# Patient Record
Sex: MALE | Race: WHITE | NOT HISPANIC OR LATINO | Employment: FULL TIME | URBAN - METROPOLITAN AREA
[De-identification: names, ages, dates, MRNs, and addresses within clinical notes are randomized per-mention and may not be internally consistent; named-entity substitution may affect disease eponyms.]

---

## 2018-09-05 ENCOUNTER — OFFICE VISIT (OUTPATIENT)
Dept: URGENT CARE | Facility: CLINIC | Age: 28
End: 2018-09-05
Payer: COMMERCIAL

## 2018-09-05 VITALS
TEMPERATURE: 102 F | OXYGEN SATURATION: 99 % | WEIGHT: 190 LBS | DIASTOLIC BLOOD PRESSURE: 78 MMHG | HEART RATE: 97 BPM | HEIGHT: 70 IN | BODY MASS INDEX: 27.2 KG/M2 | RESPIRATION RATE: 18 BRPM | SYSTOLIC BLOOD PRESSURE: 126 MMHG

## 2018-09-05 DIAGNOSIS — J01.90 ACUTE BACTERIAL RHINOSINUSITIS: Primary | ICD-10-CM

## 2018-09-05 DIAGNOSIS — B96.89 ACUTE BACTERIAL RHINOSINUSITIS: Primary | ICD-10-CM

## 2018-09-05 PROCEDURE — 99203 OFFICE O/P NEW LOW 30 MIN: CPT | Mod: S$GLB,,, | Performed by: NURSE PRACTITIONER

## 2018-09-05 RX ORDER — AMOXICILLIN AND CLAVULANATE POTASSIUM 875; 125 MG/1; MG/1
1 TABLET, FILM COATED ORAL 2 TIMES DAILY
Qty: 20 TABLET | Refills: 0 | Status: SHIPPED | OUTPATIENT
Start: 2018-09-05 | End: 2018-09-15

## 2018-09-05 RX ORDER — FLUTICASONE PROPIONATE 50 MCG
1 SPRAY, SUSPENSION (ML) NASAL DAILY
Qty: 1 BOTTLE | Refills: 0 | Status: SHIPPED | OUTPATIENT
Start: 2018-09-05 | End: 2022-06-25

## 2018-09-05 RX ORDER — PREDNISONE 20 MG/1
20 TABLET ORAL DAILY
Qty: 5 TABLET | Refills: 0 | Status: SHIPPED | OUTPATIENT
Start: 2018-09-05 | End: 2018-09-10

## 2018-09-05 RX ORDER — FLUOXETINE HYDROCHLORIDE 40 MG/1
CAPSULE ORAL
Refills: 0 | COMMUNITY
Start: 2018-08-11 | End: 2020-11-02

## 2018-09-05 RX ORDER — MONTELUKAST SODIUM 10 MG/1
10 TABLET ORAL NIGHTLY
COMMUNITY
End: 2024-03-05

## 2018-09-05 RX ORDER — FINASTERIDE 5 MG/1
TABLET, FILM COATED ORAL
Refills: 12 | COMMUNITY
Start: 2018-08-23

## 2018-09-05 NOTE — PATIENT INSTRUCTIONS
Sinusitis (Antibiotic Treatment)    The sinuses are air-filled spaces within the bones of the face. They connect to the inside of the nose. Sinusitis is an inflammation of the tissue lining the sinus cavity. Sinus inflammation can occur during a cold. It can also be due to allergies to pollens and other particles in the air. Sinusitis can cause symptoms of sinus congestion and fullness. A sinus infection causes fever, headache and facial pain. There is often green or yellow drainage from the nose or into the back of the throat (post-nasal drip). You have been given antibiotics to treat this condition.  Home care:  · Take the full course of antibiotics as instructed. Do not stop taking them, even if you feel better.  · Drink plenty of water, hot tea, and other liquids. This may help thin mucus. It also may promote sinus drainage.  · Heat may help soothe painful areas of the face. Use a towel soaked in hot water. Or,  the shower and direct the hot spray onto your face. Using a vaporizer along with a menthol rub at night may also help.   · An expectorant containing guaifenesin may help thin the mucus and promote drainage from the sinuses.  · Over-the-counter decongestants may be used unless a similar medicine was prescribed. Nasal sprays work the fastest. Use one that contains phenylephrine or oxymetazoline. First blow the nose gently. Then use the spray. Do not use these medicines more often than directed on the label or symptoms may get worse. You may also use tablets containing pseudoephedrine. Avoid products that combine ingredients, because side effects may be increased. Read labels. You can also ask the pharmacist for help. (NOTE: Persons with high blood pressure should not use decongestants. They can raise blood pressure.)  · Over-the-counter antihistamines may help if allergies contributed to your sinusitis.    · Do not use nasal rinses or irrigation during an acute sinus infection, unless told to by  your health care provider. Rinsing may spread the infection to other sinuses.  · Use acetaminophen or ibuprofen to control pain, unless another pain medicine was prescribed. (If you have chronic liver or kidney disease or ever had a stomach ulcer, talk with your doctor before using these medicines. Aspirin should never be used in anyone under 18 years of age who is ill with a fever. It may cause severe liver damage.)  · Don't smoke. This can worsen symptoms.  Follow-up care  Follow up with your healthcare provider or our staff if you are not improving within the next week.  When to seek medical advice  Call your healthcare provider if any of these occur:  · Facial pain or headache becoming more severe  · Stiff neck  · Unusual drowsiness or confusion  · Swelling of the forehead or eyelids  · Vision problems, including blurred or double vision  · Fever of 100.4ºF (38ºC) or higher, or as directed by your healthcare provider  · Seizure  · Breathing problems  · Symptoms not resolving within 10 days  Date Last Reviewed: 4/13/2015  © 3303-1473 [employer]. [address] [place] [address]. All rights reserved. This information is not intended as a substitute for professional medical care. Always follow your healthcare professional's instructions.                                                                    Sinusitis   If your condition worsens or fails to improve we recommend that you receive another evaluation at the ER immediately or contact your PCP to discuss your concerns or return here. You must understand that you've received an urgent care treatment only and that you may be released before all your medical problems are known or treated. You the patient will arrange for followup care as instructed.   If we discussed that I think your illness is viral it will not respond to antibiotics and it will last 10-14 days. However, if over the next few days the symptoms worsen start the  "antibiotics I have given you.   If we discussed that you require antibiotics start them now and take them to completion.   If you are female and on BCP and do take the antibiotics, use additional methods to prevent pregnancy while on the antibiotics and for one cycle after.   Flonase (fluticasone) is a nasal spray which is available over the counter and may help with your symptoms   Zyrtec D, Claritin D or allegra D can also help with symptoms of congestion and drainage.   If you have hypertension avoid using the "D" which is the decongestant   If you just have drainage you can take plain zyrtec, claritin or allegra   If you just have a congested feeling you can take pseudoephedrine (unless you have high blood pressure) which you have to sign for behind the counter. Do not buy the phenylephrine which is on the shelf as it is not effective   Rest and fluids are also important.   Tylenol or ibuprofen can also be used as directed for pain unless you have an allergy to them or medical condition such as stomach ulcers, kidney or liver disease or blood thinners etc for which you should not be taking these type of medications.   If you are flying in the next few days Afrin nose drops for the airplane flight upon take off and landing may help. Other than at those times refrain from using afrin.   If you were prescribed a narcotic do not drive or operate heavy machinery while taking these medications.     -Please take antibiotic to completion.  -Below are suggestions for symptomatic relief:              -Tylenol every 4 hours OR ibuprofen every 6 hours as needed for pain/fever.              -Salt water gargles to soothe throat pain.              -Chloroseptic spray also helps to numb throat pain.              -Nasal saline spray reduces inflammation and dryness.              -Warm face compresses to help with facial sinus pain/pressure.              -Vicks vapor rub at night.              -Flonase OTC or Nasacort OTC for " nasal congestion.              -Simple foods like chicken noodle soup.              -Delsym helps with coughing at night              -Zyrtec/Claritin during the day & Benadryl at night may help with allergies.    -10 days of antibiotics.  -5 days of steroids.  -Flonase twice daily.  -Afrin as needed on the plane.  -Claritin-D or Zyrtec-D.  -Be sure to drink plenty of fluids.    Please follow up with your Primary care provider within 2-5 days if your signs and symptoms have not resolved or worsen.     If your condition worsens or fails to improve we recommend that you receive another evaluation at the emergency room immediately or contact your primary medical clinic to discuss your concerns.   You must understand that you have received an Urgent Care treatment only and that you may be released before all of your medical problems are known or treated. You, the patient, will arrange for follow up care as instructed.

## 2018-09-05 NOTE — PROGRESS NOTES
"Subjective:       Patient ID: Larry Madden is a 28 y.o. male.    Vitals:    09/05/18 1710   BP: 126/78   Pulse: 97   Resp: 18   Temp: (!) 101.8 °F (38.8 °C)   SpO2: 99%   Weight: 86.2 kg (190 lb)   Height: 5' 10" (1.778 m)       Chief Complaint: Fever and Sore Throat    Pt came in today with complaint of a fever. Pt been having symptoms for about week . Pt stated that he believe caught a virus from a friend. Pt went Ent  doctor was prescribed   b12 and steroid injection about a week ago . Pt also in complaining of sinus congestion and headaches and feeling fatigue .  Pt been taking advil cold and sinus with some relief.   His symptoms initially improved but have drastically worsened over the last 2 days.  Fever returned today.  He leaves for a flight tomorrow to [place] does not want to be stuck without any antibiotics.  He is complaining of sinus pressure and headaches.      Fever    The current episode started 1 to 4 weeks ago. The problem occurs constantly. The maximum temperature noted was 100 to 100.9 F. Associated symptoms include congestion, headaches and a sore throat. Pertinent negatives include no abdominal pain, chest pain, diarrhea, nausea, rash or vomiting. He has tried nothing for the symptoms.   Sore Throat    The current episode started 1 to 4 weeks ago. The problem has been gradually improving. Sore throat worse side: both. The maximum temperature recorded prior to his arrival was 101 - 101.9 F. The pain is at a severity of 5/10. Associated symptoms include congestion and headaches. Pertinent negatives include no abdominal pain, diarrhea, shortness of breath or vomiting. He has tried acetaminophen for the symptoms. The treatment provided mild relief.     Review of Systems   Constitution: Positive for fever. Negative for chills.   HENT: Positive for congestion and sore throat.    Eyes: Negative for blurred vision.   Cardiovascular: Negative for chest pain.   Respiratory: Negative for " shortness of breath.    Skin: Negative for rash.   Musculoskeletal: Negative for back pain and joint pain.   Gastrointestinal: Negative for abdominal pain, diarrhea, nausea and vomiting.   Neurological: Positive for headaches.   Psychiatric/Behavioral: The patient is not nervous/anxious.    All other systems reviewed and are negative.      Objective:      Physical Exam   Constitutional: He is oriented to person, place, and time. He appears well-developed and well-nourished. He is cooperative.  Non-toxic appearance. He does not appear ill. No distress.   HENT:   Head: Normocephalic and atraumatic.   Right Ear: Hearing, tympanic membrane, external ear and ear canal normal.   Left Ear: Hearing, tympanic membrane, external ear and ear canal normal.   Nose: Mucosal edema and rhinorrhea present. No nasal deformity. No epistaxis. Right sinus exhibits maxillary sinus tenderness. Right sinus exhibits no frontal sinus tenderness. Left sinus exhibits maxillary sinus tenderness. Left sinus exhibits no frontal sinus tenderness.   Mouth/Throat: Uvula is midline and mucous membranes are normal. No trismus in the jaw. Normal dentition. No uvula swelling. Posterior oropharyngeal erythema (cobblestone apperance ) present. Tonsils are 1+ on the right. Tonsils are 1+ on the left. No tonsillar exudate.   Eyes: Conjunctivae and lids are normal. No scleral icterus.   Sclera clear bilat   Neck: Trachea normal, full passive range of motion without pain and phonation normal. Neck supple.   Cardiovascular: Normal rate, regular rhythm, normal heart sounds, intact distal pulses and normal pulses.   Pulmonary/Chest: Effort normal and breath sounds normal. No respiratory distress.   Abdominal: Soft. Normal appearance and bowel sounds are normal. He exhibits no distension. There is no tenderness.   Musculoskeletal: Normal range of motion. He exhibits no edema or deformity.   Neurological: He is alert and oriented to person, place, and time. He  exhibits normal muscle tone. Coordination normal.   Skin: Skin is warm, dry and intact. He is not diaphoretic. No pallor.   Psychiatric: He has a normal mood and affect. His speech is normal and behavior is normal. Judgment and thought content normal. Cognition and memory are normal.   Nursing note and vitals reviewed.      Assessment:       1. Acute bacterial rhinosinusitis        Plan:       Larry was seen today for fever and sore throat.    Diagnoses and all orders for this visit:    Acute bacterial rhinosinusitis  -     fluticasone (FLONASE) 50 mcg/actuation nasal spray; 1 spray (50 mcg total) by Each Nare route once daily.  -     amoxicillin-clavulanate 875-125mg (AUGMENTIN) 875-125 mg per tablet; Take 1 tablet by mouth 2 (two) times daily. for 10 days  -     predniSONE (DELTASONE) 20 MG tablet; Take 1 tablet (20 mg total) by mouth once daily. for 5 days      Patient Instructions   Sinusitis (Antibiotic Treatment)    The sinuses are air-filled spaces within the bones of the face. They connect to the inside of the nose. Sinusitis is an inflammation of the tissue lining the sinus cavity. Sinus inflammation can occur during a cold. It can also be due to allergies to pollens and other particles in the air. Sinusitis can cause symptoms of sinus congestion and fullness. A sinus infection causes fever, headache and facial pain. There is often green or yellow drainage from the nose or into the back of the throat (post-nasal drip). You have been given antibiotics to treat this condition.  Home care:  · Take the full course of antibiotics as instructed. Do not stop taking them, even if you feel better.  · Drink plenty of water, hot tea, and other liquids. This may help thin mucus. It also may promote sinus drainage.  · Heat may help soothe painful areas of the face. Use a towel soaked in hot water. Or,  the shower and direct the hot spray onto your face. Using a vaporizer along with a menthol rub at night may  also help.   · An expectorant containing guaifenesin may help thin the mucus and promote drainage from the sinuses.  · Over-the-counter decongestants may be used unless a similar medicine was prescribed. Nasal sprays work the fastest. Use one that contains phenylephrine or oxymetazoline. First blow the nose gently. Then use the spray. Do not use these medicines more often than directed on the label or symptoms may get worse. You may also use tablets containing pseudoephedrine. Avoid products that combine ingredients, because side effects may be increased. Read labels. You can also ask the pharmacist for help. (NOTE: Persons with high blood pressure should not use decongestants. They can raise blood pressure.)  · Over-the-counter antihistamines may help if allergies contributed to your sinusitis.    · Do not use nasal rinses or irrigation during an acute sinus infection, unless told to by your health care provider. Rinsing may spread the infection to other sinuses.  · Use acetaminophen or ibuprofen to control pain, unless another pain medicine was prescribed. (If you have chronic liver or kidney disease or ever had a stomach ulcer, talk with your doctor before using these medicines. Aspirin should never be used in anyone under 18 years of age who is ill with a fever. It may cause severe liver damage.)  · Don't smoke. This can worsen symptoms.  Follow-up care  Follow up with your healthcare provider or our staff if you are not improving within the next week.  When to seek medical advice  Call your healthcare provider if any of these occur:  · Facial pain or headache becoming more severe  · Stiff neck  · Unusual drowsiness or confusion  · Swelling of the forehead or eyelids  · Vision problems, including blurred or double vision  · Fever of 100.4ºF (38ºC) or higher, or as directed by your healthcare provider  · Seizure  · Breathing problems  · Symptoms not resolving within 10 days  Date Last Reviewed: 4/13/2015  ©  "4416-9773 [employer]. [address] [place] [address], [place] [address]. All rights reserved. This information is not intended as a substitute for professional medical care. Always follow your healthcare professional's instructions.                                                                    Sinusitis   If your condition worsens or fails to improve we recommend that you receive another evaluation at the ER immediately or contact your PCP to discuss your concerns or return here. You must understand that you've received an urgent care treatment only and that you may be released before all your medical problems are known or treated. You the patient will arrange for followup care as instructed.   If we discussed that I think your illness is viral it will not respond to antibiotics and it will last 10-14 days. However, if over the next few days the symptoms worsen start the antibiotics I have given you.   If we discussed that you require antibiotics start them now and take them to completion.   If you are female and on BCP and do take the antibiotics, use additional methods to prevent pregnancy while on the antibiotics and for one cycle after.   Flonase (fluticasone) is a nasal spray which is available over the counter and may help with your symptoms   Zyrtec D, Claritin D or allegra D can also help with symptoms of congestion and drainage.   If you have hypertension avoid using the "D" which is the decongestant   If you just have drainage you can take plain zyrtec, claritin or allegra   If you just have a congested feeling you can take pseudoephedrine (unless you have high blood pressure) which you have to sign for behind the counter. Do not buy the phenylephrine which is on the shelf as it is not effective   Rest and fluids are also important.   Tylenol or ibuprofen can also be used as directed for pain unless you have an allergy to them or medical condition such as stomach ulcers, kidney or liver disease " or blood thinners etc for which you should not be taking these type of medications.   If you are flying in the next few days Afrin nose drops for the airplane flight upon take off and landing may help. Other than at those times refrain from using afrin.   If you were prescribed a narcotic do not drive or operate heavy machinery while taking these medications.     -Please take antibiotic to completion.  -Below are suggestions for symptomatic relief:              -Tylenol every 4 hours OR ibuprofen every 6 hours as needed for pain/fever.              -Salt water gargles to soothe throat pain.              -Chloroseptic spray also helps to numb throat pain.              -Nasal saline spray reduces inflammation and dryness.              -Warm face compresses to help with facial sinus pain/pressure.              -Vicks vapor rub at night.              -Flonase OTC or Nasacort OTC for nasal congestion.              -Simple foods like chicken noodle soup.              -Delsym helps with coughing at night              -Zyrtec/Claritin during the day & Benadryl at night may help with allergies.    -10 days of antibiotics.  -5 days of steroids.  -Flonase twice daily.  -Afrin as needed on the plane.  -Claritin-D or Zyrtec-D.  -Be sure to drink plenty of fluids.    Please follow up with your Primary care provider within 2-5 days if your signs and symptoms have not resolved or worsen.     If your condition worsens or fails to improve we recommend that you receive another evaluation at the emergency room immediately or contact your primary medical clinic to discuss your concerns.   You must understand that you have received an Urgent Care treatment only and that you may be released before all of your medical problems are known or treated. You, the patient, will arrange for follow up care as instructed.

## 2018-09-09 ENCOUNTER — TELEPHONE (OUTPATIENT)
Dept: URGENT CARE | Facility: CLINIC | Age: 28
End: 2018-09-09

## 2019-06-06 ENCOUNTER — TELEPHONE (OUTPATIENT)
Dept: SLEEP MEDICINE | Facility: CLINIC | Age: 29
End: 2019-06-06

## 2019-06-06 NOTE — TELEPHONE ENCOUNTER
----- Message from Katya Copeland sent at 6/5/2019  4:34 PM CDT -----  Contact: pt   Name of Who is Calling: SIDNEY SORENSEN [[id number]]       What is the request in detail: Patient is requesting a call in regards to getting a sleep study...... Please contact to further discuss and advise.     Can the clinic reply by [facility]: yes     What Number to Call Back if not in [facility]: [phone]

## 2019-07-10 ENCOUNTER — TELEPHONE (OUTPATIENT)
Dept: SLEEP MEDICINE | Facility: CLINIC | Age: 29
End: 2019-07-10

## 2019-07-10 NOTE — TELEPHONE ENCOUNTER
----- Message from Ara Lin sent at 7/9/2019  4:59 PM CDT -----  Contact: pt can no each [phone]  Pt called to reschedule his appt . Please contact pt      Thank you!

## 2019-07-11 ENCOUNTER — OFFICE VISIT (OUTPATIENT)
Dept: SLEEP MEDICINE | Facility: CLINIC | Age: 29
End: 2019-07-11
Payer: COMMERCIAL

## 2019-07-11 VITALS
HEIGHT: 70 IN | BODY MASS INDEX: 29.08 KG/M2 | SYSTOLIC BLOOD PRESSURE: 122 MMHG | WEIGHT: 203.13 LBS | HEART RATE: 79 BPM | DIASTOLIC BLOOD PRESSURE: 81 MMHG

## 2019-07-11 DIAGNOSIS — G47.30 SLEEP APNEA, UNSPECIFIED TYPE: Primary | ICD-10-CM

## 2019-07-11 PROCEDURE — 99204 OFFICE O/P NEW MOD 45 MIN: CPT | Mod: S$GLB,,, | Performed by: PSYCHIATRY & NEUROLOGY

## 2019-07-11 PROCEDURE — 99999 PR PBB SHADOW E&M-EST. PATIENT-LVL III: ICD-10-PCS | Mod: PBBFAC,,, | Performed by: PSYCHIATRY & NEUROLOGY

## 2019-07-11 PROCEDURE — 99999 PR PBB SHADOW E&M-EST. PATIENT-LVL III: CPT | Mod: PBBFAC,,, | Performed by: PSYCHIATRY & NEUROLOGY

## 2019-07-11 PROCEDURE — 99204 PR OFFICE/OUTPT VISIT, NEW, LEVL IV, 45-59 MIN: ICD-10-PCS | Mod: S$GLB,,, | Performed by: PSYCHIATRY & NEUROLOGY

## 2019-07-11 NOTE — PATIENT INSTRUCTIONS
SLEEP LAB ([facility] or [facility]) will contact you to schedulethe sleep study. Their number is [phone] (ext [phone]). Please call them if you do not hear from them in 2 weeks from now.  The [facility] is located on 7th floor of the [facility]; [facility] is located in [facility].    SLEEP CLINIC (my assistant) will call you when the sleep study results are ready - if you have not heard from us by 2 weeks from the date of the study, please call [phone] (ext 1) or you can [facility] to contact me.    You are advised to abstain from driving should you feel sleepy or drowsy.

## 2019-07-11 NOTE — PROGRESS NOTES
Larry Madden  was seen at the request of  Self, A[facility] for sleep evaluation.    07/11/2019 INITIAL HISTORY OF PRESENT ILLNESS:  Larry Madden is a 29 y.o. male is here to be evaluated for a sleep disorder.       CHIEF COMPLAINT:      The patient's complaints include excessive daytime sleepiness, excessive daytime fatigue, snoring,  witnessed breathing pauses,  gasping for air in sleep and interrupted sleep since  Several years ago.    Worse over the last year - since he gained 30 lbs.    Prior to that - septoplasty - initial relief; but nasal patency     + asthma.    Taking Flonase and Singulair.      EPWORTH SLEEPINESS SCALE 7/11/2019   Sitting and reading 3   Watching TV 2   Sitting, inactive in a public place (e.g. a theatre or a meeting) 1   As a passenger in a car for an hour without a break 1   Lying down to rest in the afternoon when circumstances permit 2   Sitting and talking to someone 1   Sitting quietly after a lunch without alcohol 1   In a car, while stopped for a few minutes in traffic 1   Total score 12       No flowsheet data found.  No flowsheet data found.      SLEEP ROUTINE AND LIFESTYLE 07/11/2019 :  Sleep Clinic New Patient 7/11/2019   What time do you go to bed on a week day? (Give a range) 10:30-11:30 pm   What time do you go to bed on a day off? (Give a range) Midnight   How long does it take you to fall asleep? (Give a range) 5 minutes or less   On average, how many times per night do you wake up? 0   How long does it take you to fall back into sleep? (Give a range) 0   What time do you wake up to start your day on a week day? (Give a range) 7 - 7:30 am   What time do you wake up to start your day on a day off? (Give a range) 7:30 - 9:00 am   What time do you get out of bed? (Give a range) 7 - 7:30 am   On average, how many hours do you sleep? 8-9   On average, how many naps do you take per day? usually one nap on one weekend day   Rate your sleep quality from 0 to 5  (0-poor, 5-great). 2   Have you experienced:  Weight gain?   How much weight have you lost or gained (in lbs.) in the last year? I have gained 20 lbs in the past year   On average, how many times per night do you go to the bathroom?  0   Have you ever had a sleep study/CPAP machine/surgery for sleep apnea? No   Have you ever had a CPAP machine for sleep apnea? No   Have you ever had surgery for sleep apnea? No       Sleep Clinic ROS  7/11/2019   Difficulty breathing through the nose?  Yes   Sore throat or dry mouth in the morning? Sometimes   Irregular or very fast heart beat?  Sometimes   Shortness of breath?  Yes   Acid reflux? No   Body aches and pains?  Yes   Morning headaches? Sometimes   Dizziness? No   Mood changes?  Yes   Do you exercise?  Sometimes   Do you feel like moving your legs a lot?  Sometimes       + sleep talking since childhood.         The patient had septoplasty in the past; no h/o T&A      Social History:      Occupation: daytime  Bed partner:   Exercise routine: a little  Caffeine:      PREVIOUS SLEEP STUDIES:     None      DME:       PAST MEDICAL HISTORY:    Active Ambulatory Problems     Diagnosis Date Noted    No Active Ambulatory Problems     Resolved Ambulatory Problems     Diagnosis Date Noted    No Resolved Ambulatory Problems     Past Medical History:   Diagnosis Date    Anxiety                 PAST SURGICAL HISTORY:    No past surgical history on file.      FAMILY HISTORY:                Family History   Problem Relation Age of Onset    Depression Mother     Arthritis Father        SOCIAL HISTORY:          Tobacco:   Social History     Tobacco Use   Smoking Status Never Smoker   Smokeless Tobacco Never Used       alcohol use:    Social History     Substance and Sexual Activity   Alcohol Use Yes                   ALLERGIES:  Review of patient's allergies indicates:  No Known Allergies    CURRENT MEDICATIONS:    Current Outpatient Medications   Medication Sig Dispense Refill     "finasteride (PROSCAR) 5 mg tablet TK 1/2 T PO EVERY OTHER DAY  12    FLUoxetine (PROZAC) 40 MG capsule TK 1 C PO D  0    fluticasone (FLONASE) 50 mcg/actuation nasal spray 1 spray (50 mcg total) by Each Nare route once daily. 1 Bottle 0    montelukast (SINGULAIR) 10 mg tablet Take 10 mg by mouth every evening.       No current facility-administered medications for this visit.                       REVIEW OF SYSTEMS:   Sleep related symptoms as per HPI    denies weight gain  Denies dyspnea  Denies palpitations  Denies acid reflux   Denies polyuria  Denies  mood diturbance  Denies  anemia  Denies  muscle pain  Denies  Gait imbalance    Otherwise, a balance of 10 systems reviewed is negative.    PHYSICAL EXAM:  /81 (BP Location: Left arm, Patient Position: Sitting, BP Method: Large (Automatic))   Pulse 79   Ht 5' 10" (1.778 m)   Wt 92.1 kg (203 lb 2.5 oz)   BMI 29.15 kg/m²   GENERAL: Normal development, well groomed.  HEENT:   HEENT:  Conjunctivae are non-erythematous; Pupils equal, round, and reactive to light; Nose is symmetrical; Nasal mucosa is pink and moist; Septum is midline; Inferior turbinates are normal; Nasal airflow is normal; Posterior pharynx is pink; Modified Mallampati:III-IV; Posterior palate is low; Tonsils not visualized; Uvula is wide and elongated;Tongue is enlarged; Dentition is fair; No TMJ tenderness; Jaw opening and protrusion without click and without discomfort.  NECK: Supple. Neck circumference is 18 inches. No thyromegaly. No palpable nodes.     SKIN: On face and neck: No abrasions, no rashes, no lesions.  No subcutaneous nodules are palpable.  RESPIRATORY: Chest is clear to auscultation.  Normal chest expansion and non-labored breathing at rest.  CARDIOVASCULAR: Normal S1, S2.  No murmurs, gallops or rubs. No carotid bruits bilaterally.  No edema. No clubbing. No cyanosis.    NEURO: Oriented to time, place and person. Normal attention span and concentration. Gait normal.  " "  PSYCH: Affect is full. Mood is normal  MUSCULOSKELETAL: Moves 4 extremities. Gait normal.         Using [facility]: yes      ASSESSMENT:    1. Sleep Apnea NEC. The patient symptomatically has  excessive daytime sleepiness, snoring,  witnessed breathing pauses, excessive daytime fatigue, gasping for air in sleep, interrupted sleep and nocturia  with exam findings of "a crowded oral airway and elevated body mass index. The patient has medical co-morbidities of  Anxiety and depression which can be worsened by GIOVANNA. This warrants further investigation for possible obstructive sleep apnea.          PLAN:    Diagnostic: HST The nature of this procedure and its indication was discussed with the patient. he would  like to come discuss PSG results.      More than 15 minutes of this 30 minutes visit was spent in counseling: during our discussion today, we talked about the etiology of  GIOVANNA as well as the potential ramifications of untreated sleep apnea, which could include daytime sleepiness, hypertension, heart disease and/or stroke.  We discussed potential treatment options, which could include weight loss, body positioning, continuous positive airway pressure (CPAP), or referral for surgical consideration. Meanwhile, he  is urged to avoid supine sleep, weight gain and alcoholic beverages since all of these can worsen GIOVANNA.     Precautions: The patient was advised to abstain from driving should he feel sleepy or drowsy.    Follow up: MD/NP  after the sleep study has been completed.     Thank you for allowing me the opportunity to participate in the care of your patient.    This visit summary will be sent to referring provider via inbasket      "

## 2019-07-19 ENCOUNTER — TELEPHONE (OUTPATIENT)
Dept: SLEEP MEDICINE | Facility: OTHER | Age: 29
End: 2019-07-19

## 2019-08-23 ENCOUNTER — HOSPITAL ENCOUNTER (OUTPATIENT)
Dept: SLEEP MEDICINE | Facility: OTHER | Age: 29
Discharge: HOME | End: 2019-08-23
Attending: PSYCHIATRY & NEUROLOGY
Payer: COMMERCIAL

## 2019-08-23 DIAGNOSIS — G47.30 SLEEP APNEA, UNSPECIFIED TYPE: ICD-10-CM

## 2019-08-23 PROCEDURE — 95800 SLP STDY UNATTENDED: CPT

## 2019-09-19 ENCOUNTER — PATIENT MESSAGE (OUTPATIENT)
Dept: SLEEP MEDICINE | Facility: CLINIC | Age: 29
End: 2019-09-19

## 2020-01-17 ENCOUNTER — OFFICE VISIT (OUTPATIENT)
Dept: SLEEP MEDICINE | Facility: CLINIC | Age: 30
End: 2020-01-17
Payer: COMMERCIAL

## 2020-01-17 VITALS
BODY MASS INDEX: 30.29 KG/M2 | DIASTOLIC BLOOD PRESSURE: 84 MMHG | HEIGHT: 70 IN | HEART RATE: 70 BPM | SYSTOLIC BLOOD PRESSURE: 124 MMHG | WEIGHT: 211.56 LBS

## 2020-01-17 DIAGNOSIS — G47.33 OBSTRUCTIVE SLEEP APNEA: Primary | ICD-10-CM

## 2020-01-17 PROCEDURE — 99999 PR PBB SHADOW E&M-EST. PATIENT-LVL III: CPT | Mod: PBBFAC,,, | Performed by: NURSE PRACTITIONER

## 2020-01-17 PROCEDURE — 99213 OFFICE O/P EST LOW 20 MIN: CPT | Mod: S$GLB,,, | Performed by: NURSE PRACTITIONER

## 2020-01-17 PROCEDURE — 99213 PR OFFICE/OUTPT VISIT, EST, LEVL III, 20-29 MIN: ICD-10-PCS | Mod: S$GLB,,, | Performed by: NURSE PRACTITIONER

## 2020-01-17 PROCEDURE — 99999 PR PBB SHADOW E&M-EST. PATIENT-LVL III: ICD-10-PCS | Mod: PBBFAC,,, | Performed by: NURSE PRACTITIONER

## 2020-01-17 NOTE — PROGRESS NOTES
Larry Madden  was seen as f/u for mgt of GIOVANNA, initial visit with him    He has since undergone a home sleep study reviewed with him today. Has ongoing snoring and feels unrefreshed despite uninterrupted sleep.     AHI 8(rDI 23)/low sat 92.4%      HISTORY  07/11/2019 INITIAL HISTORY OF PRESENT ILLNESS:  Larry Madden is a 29 y.o. male is here to be evaluated for a sleep disorder.       CHIEF COMPLAINT:      The patient's complaints include excessive daytime sleepiness, excessive daytime fatigue, snoring,  witnessed breathing pauses,  gasping for air in sleep and interrupted sleep since  Several years ago.    Worse over the last year - since he gained 30 lbs.    Prior to that - septoplasty - initial relief; but nasal patency     + asthma.    Taking Flonase and Singulair.      EPWORTH SLEEPINESS SCALE 7/11/2019   Sitting and reading 3   Watching TV 2   Sitting, inactive in a public place (e.g. a theatre or a meeting) 1   As a passenger in a car for an hour without a break 1   Lying down to rest in the afternoon when circumstances permit 2   Sitting and talking to someone 1   Sitting quietly after a lunch without alcohol 1   In a car, while stopped for a few minutes in traffic 1   Total score 12       No flowsheet data found.  No flowsheet data found.      SLEEP ROUTINE AND LIFESTYLE 01/17/2020 :  Sleep Clinic New Patient 7/11/2019   What time do you go to bed on a week day? (Give a range) 10:30-11:30 pm   What time do you go to bed on a day off? (Give a range) Midnight   How long does it take you to fall asleep? (Give a range) 5 minutes or less   On average, how many times per night do you wake up? 0   How long does it take you to fall back into sleep? (Give a range) 0   What time do you wake up to start your day on a week day? (Give a range) 7 - 7:30 am   What time do you wake up to start your day on a day off? (Give a range) 7:30 - 9:00 am   What time do you get out of bed? (Give a range) 7 - 7:30 am   On  "average, how many hours do you sleep? 8-9   On average, how many naps do you take per day? usually one nap on one weekend day   Rate your sleep quality from 0 to 5 (0-poor, 5-great). 2   Have you experienced:  Weight gain?   How much weight have you lost or gained (in lbs.) in the last year? I have gained 20 lbs in the past year   On average, how many times per night do you go to the bathroom?  0   Have you ever had a sleep study/CPAP machine/surgery for sleep apnea? No   Have you ever had a CPAP machine for sleep apnea? No   Have you ever had surgery for sleep apnea? No     ROS 1/17/20  Difficulty breathing through the nose?  Yes   Sore throat or dry mouth in the morning? Sometimes   Irregular or very fast heart beat?  Sometimes   Shortness of breath?  Yes   Acid reflux? No   Body aches and pains?  Yes   Morning headaches? Sometimes   Dizziness? No   Mood changes?  Yes   Do you exercise?  Sometimes   Do you feel like moving your legs a lot?  Sometimes     + sleep talking since childhood.    The patient had septoplasty in the past; no h/o T&A    Social History:  Occupation: daytime  Bed partner:   Exercise routine: a little      PHYSICAL EXAM:  /84   Pulse 70   Ht 5' 10" (1.778 m)   Wt 95.9 kg (211 lb 8.5 oz)   BMI 30.35 kg/m²   GENERAL: Normal development, well groomed, obese      ASSESSMENT:    1. GIOVANNA, mild (mod by RDI). Ready to begin apap  He has medical co-morbidities of anxiety and depression obesity which can be worsened by GIOVANNA      PLAN:  APAP 6-20cm setup [facility] DME, pending auth. RTC 4-5 wks after setup adherence    We discussed potential treatment options, which could include weight loss (10-15%), body positioning, continuous positive airway pressure (CPAP-defintive), mandibular advancement splint by dentist, or referral for surgical consideration. Discussed etiology of GIOVANNA and potential ramifications of untreated GIOVANNA, including heart disease, hypertension, cognitive difficulties, stroke, and " diabetes.      Avoid driving while sleepy

## 2020-04-30 ENCOUNTER — TELEPHONE (OUTPATIENT)
Dept: SLEEP MEDICINE | Facility: CLINIC | Age: 30
End: 2020-04-30

## 2020-04-30 ENCOUNTER — OFFICE VISIT (OUTPATIENT)
Dept: SLEEP MEDICINE | Facility: CLINIC | Age: 30
End: 2020-04-30
Payer: COMMERCIAL

## 2020-04-30 DIAGNOSIS — G47.33 OSA (OBSTRUCTIVE SLEEP APNEA): ICD-10-CM

## 2020-04-30 DIAGNOSIS — G47.33 OBSTRUCTIVE SLEEP APNEA: Primary | ICD-10-CM

## 2020-04-30 DIAGNOSIS — G47.10 HYPERSOMNOLENCE: ICD-10-CM

## 2020-04-30 PROCEDURE — 99213 OFFICE O/P EST LOW 20 MIN: CPT | Mod: 95,CR,, | Performed by: NURSE PRACTITIONER

## 2020-04-30 PROCEDURE — 99213 PR OFFICE/OUTPT VISIT, EST, LEVL III, 20-29 MIN: ICD-10-PCS | Mod: 95,CR,, | Performed by: NURSE PRACTITIONER

## 2020-04-30 NOTE — PROGRESS NOTES
"The patient location is: Home  The chief complaint leading to consultation is: GIOVANNA mgt  Visit type: TELE AUDIOVISUAL:52599  Each patient to whom he or she provides medical services by telemedicine is:  (1) informed of the relationship between the physician and patient and the respective role of any other health care provider with respect to management of the patient; and (2) notified that he or she may decline to receive medical services by telemedicine and may withdraw from such care at any time.  COVID-19    NOTE: He has since gotten setup with apap 6-20cm 1/31/20. Used it ~2 wks and has few good consolidated nights but mostly would remove mask and either reapply I the beginning but the last week of use was rough/would rip off after 3-4hr. Feels pressure too high. Was using nose mask and has deviated septum. He then went to [place] (prior to F2F visit) and has been quarantined there due to Covid. He is sleeping on R side but is very tired upon awakening. "It is crippling". He has been prescribed Adderall XR ? 10mg since in [place] (taken few times and it helped focus and he didn't have to drink 2-3c coffee anymore like he does when he doesn't take it). Interested in alterative treatments for sleep apnea. Has lost 20# since seen!!    Denies cataplexy, sleep hallucinations, vivid dreams or sleep paralysis   "lifelong" excessive daytime sleepiness  Can fall asleep very quickly within 1min  Falls asleep if sitting in dark room/can't go to movies  At 10p he has irrepressible urge to sleep  2-3c coffee qd  Occasional sleep disruption    Encore:  DATE RANGE: 1/31/2020 - 2/18/2020   0% leak  90% tile 7.4cm  Compliance Summary  Days with Device Usage: 13 days  Percentage of Days >=4 Hours: 36.8%  Average Usage (Days Used): 4 hrs. 18 mins. 54 secs.  Average Usage (All Days): 2 hrs. 57 mins. 8 secs.  Apnea Indices  Average AHI: 2.7    AHI 8(RDI 23)/low sat 92.4%      EPWORTH SLEEPINESS SCALE 7/11/2019   Sitting and reading 3 "   Watching TV 2   Sitting, inactive in a public place (e.g. a theatre or a meeting) 1   As a passenger in a car for an hour without a break 1   Lying down to rest in the afternoon when circumstances permit 2   Sitting and talking to someone 1   Sitting quietly after a lunch without alcohol 1   In a car, while stopped for a few minutes in traffic 1   Total score 12     ROS  Difficulty breathing through the nose?  Yes   Sore throat or dry mouth in the morning? Sometimes   Irregular or very fast heart beat?  Sometimes   Shortness of breath?  Yes   Body aches and pains?  Yes   Morning headaches? Sometimes   Dizziness? No   Mood changes?  Yes     + sleep talking since childhood.    PHYSICAL EXAM:  There were no vitals taken for this visit.  GENERAL: Normal development, well groomed  Alert, attentive, oriented to person, place and time      ASSESSMENT:    1. GIOVANNA, mild (mod by RDI).Having pressure intolerance, difficulty using PAP due to mask removal. May consider alternative mask v switch to Bipap mode  He has medical co-morbidities of anxiety and depression obesity which can be worsened by GIOVANNA  2. Hypersomnia, out of proportion to severity of GIOVANNA. Consider eval for narcolepsy once GIOVANNA treated effectively if symptoms persist      PLAN:  APAP 6-20cm attempt resume once back to [place] (in [place] next few mos/covid) and if pressure intolernce continues plan bipap titration study.  S DME for supplies  Continue R side sleep and trial EPAP (RX emailed to him)    We discussed potential treatment options, which could include continued weight loss (10-15%), body positioning, mandibular advancement splint by dentist, or referral for surgical consideration.    Avoid driving while sleepy

## 2020-06-10 DIAGNOSIS — G47.33 OBSTRUCTIVE SLEEP APNEA: Primary | ICD-10-CM

## 2020-10-20 ENCOUNTER — TELEPHONE (OUTPATIENT)
Dept: SLEEP MEDICINE | Facility: CLINIC | Age: 30
End: 2020-10-20

## 2020-10-20 NOTE — TELEPHONE ENCOUNTER
Appointment For: Larry Madden ([id number])   Visit Type: ESTABLISHED PATIENT - VIRTUAL ([id number])      11/3/2020    2:00 PM  30 mins.  Tamara Acosta MD      [facility]      Patient Comments:   Check-in on CPAP usage and progress made.

## 2020-11-02 ENCOUNTER — OFFICE VISIT (OUTPATIENT)
Dept: PRIMARY CARE CLINIC | Facility: CLINIC | Age: 30
End: 2020-11-02
Payer: COMMERCIAL

## 2020-11-02 ENCOUNTER — LAB VISIT (OUTPATIENT)
Dept: LAB | Facility: HOSPITAL | Age: 30
End: 2020-11-02
Attending: FAMILY MEDICINE
Payer: COMMERCIAL

## 2020-11-02 VITALS
BODY MASS INDEX: 26.76 KG/M2 | OXYGEN SATURATION: 97 % | HEIGHT: 70 IN | SYSTOLIC BLOOD PRESSURE: 110 MMHG | DIASTOLIC BLOOD PRESSURE: 72 MMHG | WEIGHT: 186.94 LBS | TEMPERATURE: 98 F | HEART RATE: 64 BPM

## 2020-11-02 DIAGNOSIS — Z00.00 GENERAL MEDICAL EXAM: ICD-10-CM

## 2020-11-02 DIAGNOSIS — Z00.00 GENERAL MEDICAL EXAM: Primary | ICD-10-CM

## 2020-11-02 DIAGNOSIS — G47.33 OSA (OBSTRUCTIVE SLEEP APNEA): ICD-10-CM

## 2020-11-02 DIAGNOSIS — Z79.899 ON SELECTIVE SEROTONIN REUPTAKE INHIBITOR (SSRI) THERAPY: ICD-10-CM

## 2020-11-02 DIAGNOSIS — J30.9 CHRONIC ALLERGIC RHINITIS: ICD-10-CM

## 2020-11-02 LAB
ALBUMIN SERPL BCP-MCNC: 4.2 G/DL (ref 3.5–5.2)
ALP SERPL-CCNC: 59 U/L (ref 55–135)
ALT SERPL W/O P-5'-P-CCNC: 19 U/L (ref 10–44)
ANION GAP SERPL CALC-SCNC: 7 MMOL/L (ref 8–16)
AST SERPL-CCNC: 16 U/L (ref 10–40)
BASOPHILS # BLD AUTO: 0.03 K/UL (ref 0–0.2)
BASOPHILS NFR BLD: 0.5 % (ref 0–1.9)
BILIRUB SERPL-MCNC: 0.4 MG/DL (ref 0.1–1)
BUN SERPL-MCNC: 12 MG/DL (ref 6–20)
CALCIUM SERPL-MCNC: 9 MG/DL (ref 8.7–10.5)
CHLORIDE SERPL-SCNC: 107 MMOL/L (ref 95–110)
CHOLEST SERPL-MCNC: 132 MG/DL (ref 120–199)
CHOLEST/HDLC SERPL: 2.4 {RATIO} (ref 2–5)
CO2 SERPL-SCNC: 26 MMOL/L (ref 23–29)
CREAT SERPL-MCNC: 0.9 MG/DL (ref 0.5–1.4)
DIFFERENTIAL METHOD: ABNORMAL
EOSINOPHIL # BLD AUTO: 0.1 K/UL (ref 0–0.5)
EOSINOPHIL NFR BLD: 1 % (ref 0–8)
ERYTHROCYTE [DISTWIDTH] IN BLOOD BY AUTOMATED COUNT: 11.8 % (ref 11.5–14.5)
EST. GFR  (AFRICAN AMERICAN): >60 ML/MIN/1.73 M^2
EST. GFR  (NON AFRICAN AMERICAN): >60 ML/MIN/1.73 M^2
ESTIMATED AVG GLUCOSE: 97 MG/DL (ref 68–131)
GLUCOSE SERPL-MCNC: 94 MG/DL (ref 70–110)
HBA1C MFR BLD HPLC: 5 % (ref 4–5.6)
HCT VFR BLD AUTO: 44.1 % (ref 40–54)
HDLC SERPL-MCNC: 55 MG/DL (ref 40–75)
HDLC SERPL: 41.7 % (ref 20–50)
HGB BLD-MCNC: 14.8 G/DL (ref 14–18)
IMM GRANULOCYTES # BLD AUTO: 0.02 K/UL (ref 0–0.04)
IMM GRANULOCYTES NFR BLD AUTO: 0.3 % (ref 0–0.5)
LDLC SERPL CALC-MCNC: 63.2 MG/DL (ref 63–159)
LYMPHOCYTES # BLD AUTO: 2.4 K/UL (ref 1–4.8)
LYMPHOCYTES NFR BLD: 41.5 % (ref 18–48)
MCH RBC QN AUTO: 31.8 PG (ref 27–31)
MCHC RBC AUTO-ENTMCNC: 33.6 G/DL (ref 32–36)
MCV RBC AUTO: 95 FL (ref 82–98)
MONOCYTES # BLD AUTO: 0.3 K/UL (ref 0.3–1)
MONOCYTES NFR BLD: 5.8 % (ref 4–15)
NEUTROPHILS # BLD AUTO: 2.9 K/UL (ref 1.8–7.7)
NEUTROPHILS NFR BLD: 50.9 % (ref 38–73)
NONHDLC SERPL-MCNC: 77 MG/DL
NRBC BLD-RTO: 0 /100 WBC
PLATELET # BLD AUTO: 253 K/UL (ref 150–350)
PMV BLD AUTO: 11.7 FL (ref 9.2–12.9)
POTASSIUM SERPL-SCNC: 4.3 MMOL/L (ref 3.5–5.1)
PROT SERPL-MCNC: 6.7 G/DL (ref 6–8.4)
RBC # BLD AUTO: 4.65 M/UL (ref 4.6–6.2)
SODIUM SERPL-SCNC: 140 MMOL/L (ref 136–145)
T4 FREE SERPL-MCNC: 0.97 NG/DL (ref 0.71–1.51)
TRIGL SERPL-MCNC: 69 MG/DL (ref 30–150)
TSH SERPL DL<=0.005 MIU/L-ACNC: 0.54 UIU/ML (ref 0.4–4)
WBC # BLD AUTO: 5.73 K/UL (ref 3.9–12.7)

## 2020-11-02 PROCEDURE — 99395 PREV VISIT EST AGE 18-39: CPT | Mod: S$GLB,,, | Performed by: FAMILY MEDICINE

## 2020-11-02 PROCEDURE — 85025 COMPLETE CBC W/AUTO DIFF WBC: CPT

## 2020-11-02 PROCEDURE — 80061 LIPID PANEL: CPT

## 2020-11-02 PROCEDURE — 83036 HEMOGLOBIN GLYCOSYLATED A1C: CPT

## 2020-11-02 PROCEDURE — 84439 ASSAY OF FREE THYROXINE: CPT

## 2020-11-02 PROCEDURE — 99999 PR PBB SHADOW E&M-EST. PATIENT-LVL III: ICD-10-PCS | Mod: PBBFAC,,, | Performed by: FAMILY MEDICINE

## 2020-11-02 PROCEDURE — 36415 COLL VENOUS BLD VENIPUNCTURE: CPT | Mod: PN

## 2020-11-02 PROCEDURE — 84443 ASSAY THYROID STIM HORMONE: CPT

## 2020-11-02 PROCEDURE — 99999 PR PBB SHADOW E&M-EST. PATIENT-LVL III: CPT | Mod: PBBFAC,,, | Performed by: FAMILY MEDICINE

## 2020-11-02 PROCEDURE — 99395 PR PREVENTIVE VISIT,EST,18-39: ICD-10-PCS | Mod: S$GLB,,, | Performed by: FAMILY MEDICINE

## 2020-11-02 PROCEDURE — 80053 COMPREHEN METABOLIC PANEL: CPT

## 2020-11-02 RX ORDER — FLUVOXAMINE MALEATE 100 MG/1
CAPSULE, EXTENDED RELEASE ORAL
COMMUNITY
Start: 2020-10-31 | End: 2024-03-05

## 2020-11-02 RX ORDER — PROPRANOLOL HYDROCHLORIDE 20 MG/1
TABLET ORAL
COMMUNITY
Start: 2020-10-25 | End: 2021-05-05

## 2020-11-02 RX ORDER — CETIRIZINE HYDROCHLORIDE 10 MG/1
10 TABLET ORAL
COMMUNITY
End: 2024-03-05

## 2020-11-02 RX ORDER — FLUVOXAMINE MALEATE 150 MG/1
300 CAPSULE, EXTENDED RELEASE ORAL NIGHTLY
COMMUNITY
Start: 2020-10-31

## 2020-11-02 NOTE — PROGRESS NOTES
Subjective:   Patient ID: Larry Madden is a 30 y.o. male.    Chief Complaint: Annual Exam      HPI  30-year-old male here for annual exam and to establish with new PCP  Patient queried and denies any further complaints    Health maintenance review  Tdap up-to-date  Flu up-to-date  Sees outside psychiatry  Sees outside derm  On CPAP sleep apnea      PAST MEDICAL HISTORY:  Past Medical History:   Diagnosis Date    Anxiety        PAST SURGICAL HISTORY:  History reviewed. No pertinent surgical history.    SOCIAL HISTORY:  Social History     Socioeconomic History    Marital status: Single     Spouse name: Not on file    Number of children: Not on file    Years of education: Not on file    Highest education level: Not on file   Occupational History    Not on file   Social Needs    Financial resource strain: Not hard at all    Food insecurity     Worry: Never true     Inability: Never true    Transportation needs     Medical: No     Non-medical: No   Tobacco Use    Smoking status: Never Smoker    Smokeless tobacco: Never Used   Substance and Sexual Activity    Alcohol use: Yes     Frequency: 2-3 times a week     Drinks per session: 3 or 4     Binge frequency: Less than monthly    Drug use: No    Sexual activity: Yes   Lifestyle    Physical activity     Days per week: 2 days     Minutes per session: 30 min    Stress: Rather much   Relationships    Social connections     Talks on phone: More than three times a week     Gets together: Once a week     Attends Scientologist service: Not on file     Active member of club or organization: No     Attends meetings of clubs or organizations: More than 4 times per year     Relationship status: Never    Other Topics Concern    Not on file   Social History Narrative    Not on file       FAMILY HISTORY:  Family History   Problem Relation Age of Onset    Depression Mother     Arthritis Father        ALLERGIES AND MEDICATIONS: updated and reviewed.  Review  of patient's allergies indicates:  No Known Allergies    Current Outpatient Medications:     cetirizine (ZYRTEC) 10 MG tablet, Take 10 mg by mouth., Disp: , Rfl:     finasteride (PROSCAR) 5 mg tablet, TK 1/2 T PO EVERY OTHER DAY, Disp: , Rfl: 12    fluticasone (FLONASE) 50 mcg/actuation nasal spray, 1 spray (50 mcg total) by Each Nare route once daily., Disp: 1 Bottle, Rfl: 0    fluvoxaMINE (LUVOX) 150 mg 24 hr capsule, , Disp: , Rfl:     fluvoxaMINE 100 mg Cp24, , Disp: , Rfl:     montelukast (SINGULAIR) 10 mg tablet, Take 10 mg by mouth every evening., Disp: , Rfl:     propranoloL (INDERAL) 20 MG tablet, TK 1 T PO  ONCE D PRF PERFORMANCE ANXIETY, Disp: , Rfl:     Review of Systems   Constitutional: Negative for activity change, appetite change, chills, diaphoresis, fatigue, fever and unexpected weight change.   HENT: Negative for congestion, ear discharge, ear pain, facial swelling, hearing loss, nosebleeds, postnasal drip, rhinorrhea, sinus pressure, sneezing, sore throat, tinnitus, trouble swallowing and voice change.    Eyes: Negative for photophobia, pain, discharge, redness, itching and visual disturbance.   Respiratory: Negative for cough, chest tightness, shortness of breath and wheezing.    Cardiovascular: Negative for chest pain, palpitations and leg swelling.   Gastrointestinal: Negative for abdominal distention, abdominal pain, anal bleeding, blood in stool, constipation, diarrhea, nausea, rectal pain and vomiting.   Endocrine: Negative for cold intolerance, heat intolerance, polydipsia, polyphagia and polyuria.   Genitourinary: Negative for difficulty urinating, dysuria, flank pain, hematuria and urgency.   Musculoskeletal: Negative for arthralgias, back pain, joint swelling and myalgias.   Skin: Negative for rash.   Neurological: Negative for dizziness, tremors, seizures, syncope, speech difficulty, weakness, light-headedness, numbness and headaches.   Psychiatric/Behavioral: Negative for  "behavioral problems, confusion, decreased concentration, dysphoric mood, sleep disturbance and suicidal ideas. The patient is not nervous/anxious and is not hyperactive.        Objective:     Vitals:    11/02/20 0824   BP: 110/72   Pulse: 64   Temp: 98.3 °F (36.8 °C)   TempSrc: Oral   SpO2: 97%   Weight: 84.8 kg (186 lb 15.2 oz)   Height: 5' 9.5" (1.765 m)   PainSc: 0-No pain     Body mass index is 27.21 kg/m².    Physical Exam  Vitals signs and nursing note reviewed.   Constitutional:       General: He is not in acute distress.     Appearance: He is well-developed. He is not diaphoretic.   HENT:      Head: Normocephalic and atraumatic.      Right Ear: Hearing, tympanic membrane, ear canal and external ear normal. No tenderness.      Left Ear: Hearing, tympanic membrane, ear canal and external ear normal. No tenderness.      Nose: Nose normal.   Eyes:      General: Lids are normal. No scleral icterus.        Right eye: No discharge.         Left eye: No discharge.      Extraocular Movements:      Right eye: Normal extraocular motion.      Left eye: Normal extraocular motion.      Conjunctiva/sclera: Conjunctivae normal.      Right eye: Right conjunctiva is not injected.      Left eye: Left conjunctiva is not injected.      Pupils: Pupils are equal, round, and reactive to light.   Neck:      Musculoskeletal: Normal range of motion and neck supple. No edema.      Thyroid: No thyromegaly.      Vascular: No carotid bruit or JVD.      Trachea: No tracheal deviation.   Cardiovascular:      Rate and Rhythm: Normal rate and regular rhythm.      Pulses: Normal pulses.      Heart sounds: Normal heart sounds. No murmur. No friction rub.   Pulmonary:      Effort: Pulmonary effort is normal. No accessory muscle usage or respiratory distress.      Breath sounds: Normal breath sounds. No wheezing, rhonchi or rales.   Abdominal:      General: Bowel sounds are normal. There is no distension or abdominal bruit.      Palpations: " Abdomen is soft. There is no mass or pulsatile mass.      Tenderness: There is no abdominal tenderness. There is no guarding or rebound. Negative signs include Hackett's sign and McBurney's sign.   Lymphadenopathy:      Head:      Right side of head: No submandibular, preauricular or posterior auricular adenopathy.      Left side of head: No submandibular, preauricular or posterior auricular adenopathy.      Cervical: No cervical adenopathy.   Skin:     General: Skin is warm and dry.      Findings: No ecchymosis, erythema or rash. Rash is not urticarial.      Nails: There is no clubbing.     Neurological:      Mental Status: He is alert and oriented to person, place, and time.      GCS: GCS eye subscore is 4. GCS verbal subscore is 5. GCS motor subscore is 6.   Psychiatric:         Mood and Affect: Mood is not anxious or depressed. Affect is not angry or inappropriate.         Speech: Speech normal.         Behavior: Behavior normal. Behavior is cooperative.         Thought Content: Thought content normal.         Assessment and Plan:   Larry was seen today for annual exam.    Diagnoses and all orders for this visit:    General medical exam  -     CBC Auto Differential; Future  -     Comprehensive Metabolic Panel; Future  -     Hemoglobin A1C; Future  -     Lipid Panel; Future  -     TSH; Future  -     T4, Free; Future    GIOVANNA (obstructive sleep apnea)    Chronic allergic rhinitis    On selective serotonin reuptake inhibitor (SSRI) therapy        No follow-ups on file.      THIS NOTE WILL BE SHARED WITH THE PATIENT.

## 2020-11-03 ENCOUNTER — OFFICE VISIT (OUTPATIENT)
Dept: SLEEP MEDICINE | Facility: CLINIC | Age: 30
End: 2020-11-03
Payer: COMMERCIAL

## 2020-11-03 DIAGNOSIS — G47.33 OSA (OBSTRUCTIVE SLEEP APNEA): Primary | ICD-10-CM

## 2020-11-03 PROCEDURE — 99213 OFFICE O/P EST LOW 20 MIN: CPT | Mod: 95,,, | Performed by: PSYCHIATRY & NEUROLOGY

## 2020-11-03 PROCEDURE — 99213 PR OFFICE/OUTPT VISIT, EST, LEVL III, 20-29 MIN: ICD-10-PCS | Mod: 95,,, | Performed by: PSYCHIATRY & NEUROLOGY

## 2020-11-03 NOTE — PROGRESS NOTES
The patient location is: home  The chief complaint leading to consultation is: sleep disorder  Visit type: audiovisual  Total time spent with patient: 30 min  Each patient to whom he or she provides medical services by telemedicine is:  (1) informed of the relationship between the physician and patient and the respective role of any other health care provider with respect to management of the patient; and (2) notified that he or she may decline to receive medical services by telemedicine and may withdraw from such care at any time.            Larry Madden is a 30 y.o. male seen today for CPAP follow up. Last seen on 7/11/2019.    He has not been doing well since his last visit.  Still uncomfortable with CPAP.  Still unhappy with his mask - more leak.     The patient reports improved sleep continuity and daytime sleepiness on PAP. ESS today is 12/24.  Denies break through snoring.  + dry mouth.   He is hardly ever able to tolerate CPAP for the whole duration of the sleep time - he tends to pull his mask off in his sleep.    He states that he has tried both nasal, and full face styles.   He has lost 25 lbs since his last visit in January, but still reports feeling tired and snores, which caused significant problems at work and with his relationship.    He is interested to learn about alternative treatment options while continuing his attempts to get acclimated with CPAP.     Medications pertinent to sleep disorders: *no  Previously tried medications:    Sleep Studies:       APAP 6-20 cm H2O  Therapy Event Summary (Last 14 Days)     Compliance Summary  Apnea Indices        Days with Device Usage:  4 days  Average AHI:  4.7    Percentage of Days >=4 Hours:  14.3%  Average OA Index:  0.2    Average Usage (Days Used):  4 hrs. 22 mins. 8 secs.  Average CA Index:  1.0    Average Usage (All Days):  1 hrs. 14 mins. 53 secs.         Large Leak  Periodic Breathing    Average Time in Large Leak:  16 mins.  Average % of  Night in PB:  0.0%    Average % of Night in Large Leak:  6.1%                PHYSICAL EXAM:  There were no vitals taken for this visit.  GENERAL: Normal development, well groomed  Alert, attentive, oriented to person, place and time      ASSESSMENT:    1. GIOVANNA, mild (moeratee  by RDI) - still remains symptomatic despite significant weight loss. Unable to tolerate CPAP through the night.   Having pressure intolerance, difficulty using PAP due to mask removal. May consider alternative mask v switch to Bipap mode  He has medical co-morbidities of anxiety and depression obesity which can be worsened by GIOVANNA    2. Hypersomnia, out of proportion to severity of GIOVANNA. Consider eval for narcolepsy once GIOVANNA treated effectively if symptoms persist      PLAN:    APAP 6-20cm - was increased to 7-20 cm to see if there is additional benefit.   CPAP compliance was encouraged.  Will place and ENT referral to discuss surgical treatment options for the GIOVANNA.  Will also provide Larry Madden with the list of the dentists to explore OA (oral appliance) for GIOVANNA      We discussed potential treatment options, which could include continued weight loss (10-15%), body positioning, mandibular advancement splint by dentist, or referral for surgical consideration.    Avoid driving while sleepy

## 2020-11-04 ENCOUNTER — OFFICE VISIT (OUTPATIENT)
Dept: OTOLARYNGOLOGY | Facility: CLINIC | Age: 30
End: 2020-11-04
Payer: COMMERCIAL

## 2020-11-04 VITALS
DIASTOLIC BLOOD PRESSURE: 73 MMHG | SYSTOLIC BLOOD PRESSURE: 108 MMHG | HEIGHT: 70 IN | HEART RATE: 69 BPM | BODY MASS INDEX: 26.68 KG/M2 | WEIGHT: 186.38 LBS | TEMPERATURE: 98 F

## 2020-11-04 DIAGNOSIS — M95.0 NASAL DEFORMITY, ACQUIRED: Primary | ICD-10-CM

## 2020-11-04 DIAGNOSIS — Z01.818 PRE-OP TESTING: Primary | ICD-10-CM

## 2020-11-04 DIAGNOSIS — J34.3 NASAL TURBINATE HYPERTROPHY: ICD-10-CM

## 2020-11-04 DIAGNOSIS — J34.89 NASAL OBSTRUCTION: ICD-10-CM

## 2020-11-04 DIAGNOSIS — G47.33 OSA (OBSTRUCTIVE SLEEP APNEA): ICD-10-CM

## 2020-11-04 DIAGNOSIS — J34.2 NASAL SEPTAL DEVIATION: ICD-10-CM

## 2020-11-04 DIAGNOSIS — Z01.812 PRE-PROCEDURE LAB EXAM: ICD-10-CM

## 2020-11-04 PROCEDURE — 99202 OFFICE O/P NEW SF 15 MIN: CPT | Mod: S$GLB,,, | Performed by: OTOLARYNGOLOGY

## 2020-11-04 PROCEDURE — 99202 PR OFFICE/OUTPT VISIT, NEW, LEVL II, 15-29 MIN: ICD-10-PCS | Mod: S$GLB,,, | Performed by: OTOLARYNGOLOGY

## 2020-11-04 NOTE — PATIENT INSTRUCTIONS
Dear Mr. Madden     Please call [phone] to schedule an appointment with ENT to discuss GIOVANNA surgery - I know dr. Orantes and Dr. Burks do such surgeries      Please also see the list of the dentists below - and ask you r own doctor in regards to OA (oral appliance) for GIOVANNA    Also, I have adjusted your CPAP settings. It may take up to 24 hrs for the machine to absorb the new settings from the sever.      For OA (oral appliance) for Obstructive sleep apnea (GIOVANNA) please call         Dr. Smita SIMS  [facility] [phone]   Dr. Julian Lacy [phone] -[facility]     Or   Dr. Chris Kelly, DDS [phone] -[place]    Or Dr. Doyle Norris [phone] - [facility]        Sincerely,    Dr. Acosta        Sincerely,    Tamara Acosta MD

## 2020-11-04 NOTE — H&P (VIEW-ONLY)
Mr. Madden     Vitals:    20 0952   BP: 108/73   Pulse: 69   Temp: 97.7 °F (36.5 °C)       Chief Complaint:  Possible surgery for sleep apnea       HPI:   is a 30-year-old white male who presents referred by  for obstructive sleep apnea.  He has had the diagnosis of GIOVANNA approximately a year and has tried CPAP with multiple masks.  He states he likes the CPAP however he finds that he removes his mask at night unconsciously and thus does not achieve benefits of its use.  He feels that he has had apnea throughout his earlier years as well.  He denies any allergy symptomatology and no specific history of trauma.  He is status post septoplasty and submucous resection of turbinates approximately 6 years ago elsewhere and has recently undergone a balloon sinuplasty.  He does feel nasal obstruction when he lays on his side from 1 nostril.    Review of Systems:  Constitutional:   weight loss or weight gain: Negative  Allergy/Immunologic:   Negative  Nasal Congestion/Obstruction:   Positive  Nosebleeds:   Negative  Sinus infections:   Positive history the resolved with balloon sinuplasty.  Headache/Facial Pain:   Positive for headaches  Snoring/GIOVANNA:   Positive  Throat: Infections/Pain:   Negative  Hoarseness/Speech Disturbance:   Negative  Trauma Hx:  Negative    Cardiovascular:  M/I Angina: Negative  Hypertension: Negative  Endocrine:    DM/Steroids: Negative  GI:   Dysphagia/Reflux: Negative  :   GYN Pregnancy: Negative  Renal:   Dialysis: Negative  Lymphatic:   Neck Mass/Lymphadenopathy: Negative  Muscoloskeletal:   Negative  Hematologic:   Bleeding Disorders/Anemia: Negative  Neurologic:    Cranial/Neuralgia: Negative  Pulmonary:   Asthma/SOB/Cough: Negative  Skin Disorders: Negative    Past Medical/Surgical/Family/Social History:    ENT Surgery:  Positive as above  Occupational Exposure: Negative   Problems: Negative  Cancer: Negative    Past Family History:   Family history of Cancer:  Negative    Past Social History:   Tobacco: Nonsmoker   Alcohol: Social Drinker      Allergies and medications: Reviewed per med card.    Physical Examination:  Ears:   External auditory canals:  Clear   Hearing: Grossly intact   Tympanic Membranes: Clear  Nose:   External:  Dorsal deviation to his right   Intranasal:  Marked septal deviation to the left, 2+ turbinates, all this together creating 90% obstruction on his left side.  Mouth:   Intraorally: Lips, teeth, and gums: Normal   Oropharynx: Normal   Mucosa: Normal   Tongue: Normal  Throat:      Palate: Normal palate with elevation Mallampati 1   Tonsils:  1+ imbedded   Posterior Pharynx: Normal  Fiberoptic exam: Not performed  Head/Face:     Inspection: Normal and atraumatic   Palpation/Percussion: Non tender   Facial strength: Normal and symmetric   Salivary glands: Normal  Neck: Supple  Thyroid: No masses  Lymphatics: No nodes  Respiratory:   Effort: Normal  Eyes:   Ocular Mobility: Normal   Vision: Grossly intact  Neuro/Psych:   Cranial Nerves: Grossly Intact   Orientation: Normal   Mood/Affect: Normal      Assessment/Plan:  I have discussed my findings with him in detail as well as my recommendations for treatment.  He understands that only CPAP and tracheostomy are guarantee treatments of sleep apnea.  We have discussed options including oral appliance as well.  Surgically we discussed nasal reconstruction with osteotomies, septoplasty, submucous resection of turbinates to improve his nasal airway.  He again understands that this is no guarantee that this will correct his sleep apnea.  After further discussion he wishes to schedule this procedure.

## 2020-11-28 ENCOUNTER — PATIENT MESSAGE (OUTPATIENT)
Dept: SURGERY | Facility: OTHER | Age: 30
End: 2020-11-28

## 2020-11-30 ENCOUNTER — HOSPITAL ENCOUNTER (OUTPATIENT)
Dept: PREADMISSION TESTING | Facility: OTHER | Age: 30
Discharge: HOME | End: 2020-11-30
Attending: OTOLARYNGOLOGY
Payer: COMMERCIAL

## 2020-11-30 ENCOUNTER — ANESTHESIA EVENT (OUTPATIENT)
Dept: SURGERY | Facility: OTHER | Age: 30
End: 2020-11-30
Payer: COMMERCIAL

## 2020-11-30 VITALS
BODY MASS INDEX: 26.66 KG/M2 | HEIGHT: 69 IN | OXYGEN SATURATION: 98 % | RESPIRATION RATE: 16 BRPM | TEMPERATURE: 98 F | WEIGHT: 180 LBS | SYSTOLIC BLOOD PRESSURE: 129 MMHG | HEART RATE: 70 BPM | DIASTOLIC BLOOD PRESSURE: 76 MMHG

## 2020-11-30 DIAGNOSIS — Z01.812 PRE-PROCEDURE LAB EXAM: ICD-10-CM

## 2020-11-30 DIAGNOSIS — Z01.818 PRE-OP TESTING: ICD-10-CM

## 2020-11-30 LAB
ANION GAP SERPL CALC-SCNC: 7 MMOL/L (ref 8–16)
BASOPHILS # BLD AUTO: 0.03 K/UL (ref 0–0.2)
BASOPHILS NFR BLD: 0.5 % (ref 0–1.9)
BUN SERPL-MCNC: 16 MG/DL (ref 6–20)
CALCIUM SERPL-MCNC: 9.3 MG/DL (ref 8.7–10.5)
CHLORIDE SERPL-SCNC: 106 MMOL/L (ref 95–110)
CO2 SERPL-SCNC: 30 MMOL/L (ref 23–29)
CREAT SERPL-MCNC: 1 MG/DL (ref 0.5–1.4)
DIFFERENTIAL METHOD: ABNORMAL
EOSINOPHIL # BLD AUTO: 0 K/UL (ref 0–0.5)
EOSINOPHIL NFR BLD: 0.7 % (ref 0–8)
ERYTHROCYTE [DISTWIDTH] IN BLOOD BY AUTOMATED COUNT: 11.8 % (ref 11.5–14.5)
EST. GFR  (AFRICAN AMERICAN): >60 ML/MIN/1.73 M^2
EST. GFR  (NON AFRICAN AMERICAN): >60 ML/MIN/1.73 M^2
GLUCOSE SERPL-MCNC: 100 MG/DL (ref 70–110)
HCT VFR BLD AUTO: 42.9 % (ref 40–54)
HGB BLD-MCNC: 14.9 G/DL (ref 14–18)
IMM GRANULOCYTES # BLD AUTO: 0.02 K/UL (ref 0–0.04)
IMM GRANULOCYTES NFR BLD AUTO: 0.3 % (ref 0–0.5)
LYMPHOCYTES # BLD AUTO: 2.1 K/UL (ref 1–4.8)
LYMPHOCYTES NFR BLD: 35.8 % (ref 18–48)
MCH RBC QN AUTO: 32 PG (ref 27–31)
MCHC RBC AUTO-ENTMCNC: 34.7 G/DL (ref 32–36)
MCV RBC AUTO: 92 FL (ref 82–98)
MONOCYTES # BLD AUTO: 0.3 K/UL (ref 0.3–1)
MONOCYTES NFR BLD: 5.5 % (ref 4–15)
NEUTROPHILS # BLD AUTO: 3.4 K/UL (ref 1.8–7.7)
NEUTROPHILS NFR BLD: 57.2 % (ref 38–73)
NRBC BLD-RTO: 0 /100 WBC
PLATELET # BLD AUTO: 251 K/UL (ref 150–350)
PLATELET FUNCTION ASSAY - EPINEPHRINE: 107 SECS (ref 76–199)
PMV BLD AUTO: 10.9 FL (ref 9.2–12.9)
POTASSIUM SERPL-SCNC: 4 MMOL/L (ref 3.5–5.1)
RBC # BLD AUTO: 4.65 M/UL (ref 4.6–6.2)
SODIUM SERPL-SCNC: 143 MMOL/L (ref 136–145)
WBC # BLD AUTO: 5.86 K/UL (ref 3.9–12.7)

## 2020-11-30 PROCEDURE — U0003 INFECTIOUS AGENT DETECTION BY NUCLEIC ACID (DNA OR RNA); SEVERE ACUTE RESPIRATORY SYNDROME CORONAVIRUS 2 (SARS-COV-2) (CORONAVIRUS DISEASE [COVID-19]), AMPLIFIED PROBE TECHNIQUE, MAKING USE OF HIGH THROUGHPUT TECHNOLOGIES AS DESCRIBED BY CMS-2020-01-R: HCPCS

## 2020-11-30 PROCEDURE — 85025 COMPLETE CBC W/AUTO DIFF WBC: CPT

## 2020-11-30 PROCEDURE — 36415 COLL VENOUS BLD VENIPUNCTURE: CPT

## 2020-11-30 PROCEDURE — 85576 BLOOD PLATELET AGGREGATION: CPT

## 2020-11-30 PROCEDURE — 80048 BASIC METABOLIC PNL TOTAL CA: CPT

## 2020-11-30 RX ORDER — DIAZEPAM 5 MG/1
5 TABLET ORAL EVERY 12 HOURS PRN
COMMUNITY

## 2020-11-30 RX ORDER — ALBUTEROL SULFATE 2.5 MG/.5ML
2.5 SOLUTION RESPIRATORY (INHALATION)
Status: CANCELLED | OUTPATIENT
Start: 2020-11-30 | End: 2020-11-30

## 2020-11-30 RX ORDER — SODIUM CHLORIDE, SODIUM LACTATE, POTASSIUM CHLORIDE, CALCIUM CHLORIDE 600; 310; 30; 20 MG/100ML; MG/100ML; MG/100ML; MG/100ML
INJECTION, SOLUTION INTRAVENOUS CONTINUOUS
Status: CANCELLED | OUTPATIENT
Start: 2020-11-30

## 2020-11-30 NOTE — ANESTHESIA PREPROCEDURE EVALUATION
11/30/2020  Larry Madden is a 30 y.o., male.    Anesthesia Evaluation    I have reviewed the Patient Summary Reports.    I have reviewed the Nursing Notes. I have reviewed the NPO Status.   I have reviewed the Medications.     Review of Systems  Anesthesia Hx:  No problems with previous Anesthesia  Denies Family Hx of Anesthesia complications.   Denies Personal Hx of Anesthesia complications.   Social:  Non-Smoker    Hematology/Oncology:  Hematology Normal   Oncology Normal     EENT/Dental:EENT/Dental Normal   Cardiovascular:  Cardiovascular Normal Exercise tolerance: good     Pulmonary:   Asthma mild Sleep Apnea, CPAP    Renal/:  Renal/ Normal     Musculoskeletal:  Musculoskeletal Normal    Neurological:  Neurology Normal    Endocrine:  Endocrine Normal    Dermatological:  Skin Normal    Psych:  Psychiatric Normal           Physical Exam  General:  Well nourished    Airway/Jaw/Neck:  Airway Findings: Mouth Opening: Normal Tongue: Normal  General Airway Assessment: Adult  Mallampati: I  TM Distance: Normal, at least 6 cm  Jaw/Neck Findings:  Neck ROM: Normal ROM      Dental:  Dental Findings: In tact             Anesthesia Plan  Type of Anesthesia, risks & benefits discussed:  Anesthesia Type:  general  Patient's Preference:   Intra-op Monitoring Plan: standard ASA monitors  Intra-op Monitoring Plan Comments:   Post Op Pain Control Plan: per primary service following discharge from PACU and multimodal analgesia  Post Op Pain Control Plan Comments:   Induction:   IV  Beta Blocker:         Informed Consent: Patient understands risks and agrees with Anesthesia plan.  Questions answered. Anesthesia consent signed with patient.  ASA Score: 1     Day of Surgery Review of History & Physical:    H&P update referred to the surgeon.         Ready For Surgery From Anesthesia Perspective.

## 2020-11-30 NOTE — DISCHARGE INSTRUCTIONS
Information to Prepare you for your Surgery    PRE-ADMIT TESTING -  [phone]    [address]  [place] [facility]          Your surgery has been scheduled at [facility]. We are pleased to have the opportunity to serve you. For Further Information please call [phone].    On the day of surgery please report to the Information Desk on the 1st floor.    · CONTACT YOUR PHYSICIAN'S OFFICE THE DAY PRIOR TO YOUR SURGERY TO OBTAIN YOUR ARRIVAL TIME.     · The evening before surgery do not eat anything after 9 p.m. ( this includes hard candy, chewing gum and mints).  You may only have GATORADE, POWERADE AND WATER  from 9 p.m. until you leave your home.   DO NOT DRINK ANY LIQUIDS ON THE WAY TO THE HOSPITAL.      SPECIAL MEDICATION INSTRUCTIONS: TAKE medications checked off by the Anesthesiologist on your Medication List.    Angiogram Patients: Take medications as instructed by your physician, including aspirin.     Surgery Patients:    If you take ASPIRIN - Your PHYSICIAN/SURGEON will need to inform you IF/OR when you need to stop taking aspirin prior to your surgery.     Do Not take any medications containing IBUPROFEN.  Do Not Wear any make-up or dark nail polish   (especially eye make-up) to surgery. If you come to surgery with makeup on you will be required to remove the makeup or nail polish.    Do not shave your surgical area at least 5 days prior to your surgery. The surgical prep will be performed at the hospital according to Infection Control regulations.    Leave all valuables at home.   Do Not wear any jewelry or watches, including any metal in body piercings. Jewelry must be removed prior to coming to the hospital.  There is a possibility that rings that are unable to be removed may be cut off if they are on the surgical extremity.    Contact Lens must be removed before surgery. Either do not wear the contact lens or bring a case and solution for  storage.  Please bring a container for eyeglasses or dentures as required.  Bring any paperwork your physician has provided, such as consent forms,  history and physicals, doctor's orders, etc.   Bring comfortable clothes that are loose fitting to wear upon discharge. Take into consideration the type of surgery being performed.  Maintain your diet as advised per your physician the day prior to surgery.      Adequate rest the night before surgery is advised.   Park in the Parking lot behind the hospital or in the [place] across the street from the parking lot. Parking is complimentary.  If you will be discharged the same day as your procedure, please arrange for a responsible adult to drive you home or to accompany you if traveling by taxi.   YOU WILL NOT BE PERMITTED TO DRIVE OR TO LEAVE THE HOSPITAL ALONE AFTER SURGERY.   If you are being discharged the same day, it is strongly recommended that you arrange for someone to remain with you for the first 24 hrs following your surgery.    The Surgeon will speak to your family/visitor after your surgery regarding the outcome of your surgery and post op care.  The Surgeon may speak to you after your surgery, but there is a possibility you may not remember the details.  Please check with your family members regarding the conversation with the Surgeon.    We strongly recommend whoever is bringing you home be present for discharge instructions.  This will ensure a thorough understanding for your post op home care.    ALL CHILDREN MUST ALWAYS BE ACCOMPANIED BY AN ADULT.    Visitors-Refer to current Visitor policy handouts.    Thank you for your cooperation.  The Staff of [facility].                Bathing Instructions with Hibiclens     Shower the evening before and morning of your procedure with Hibiclens:   Wash your face with water and your regular face wash/soap   Apply Hibiclens directly on your skin or on a wet washcloth and wash  gently. When showering: Move away from the shower stream when applying Hibiclens to avoid rinsing off too soon.   Rinse thoroughly with warm water   Do not dilute Hibiclens         Dry off as usual, do not use any deodorant, powder, body lotions, perfume, after shave or cologne.

## 2020-12-01 LAB — SARS-COV-2 RNA RESP QL NAA+PROBE: NOT DETECTED

## 2020-12-03 ENCOUNTER — ANESTHESIA (OUTPATIENT)
Dept: SURGERY | Facility: OTHER | Age: 30
End: 2020-12-03
Payer: COMMERCIAL

## 2020-12-03 ENCOUNTER — HOSPITAL ENCOUNTER (OUTPATIENT)
Facility: OTHER | Age: 30
Discharge: HOME | End: 2020-12-04
Attending: OTOLARYNGOLOGY | Admitting: OTOLARYNGOLOGY
Payer: COMMERCIAL

## 2020-12-03 DIAGNOSIS — J34.2 DEVIATED NASAL SEPTUM: Primary | ICD-10-CM

## 2020-12-03 PROCEDURE — 63600175 PHARM REV CODE 636 W HCPCS: Performed by: NURSE ANESTHETIST, CERTIFIED REGISTERED

## 2020-12-03 PROCEDURE — 25000003 PHARM REV CODE 250: Performed by: STUDENT IN AN ORGANIZED HEALTH CARE EDUCATION/TRAINING PROGRAM

## 2020-12-03 PROCEDURE — 27201423 OPTIME MED/SURG SUP & DEVICES STERILE SUPPLY: Performed by: OTOLARYNGOLOGY

## 2020-12-03 PROCEDURE — 25000242 PHARM REV CODE 250 ALT 637 W/ HCPCS: Performed by: ANESTHESIOLOGY

## 2020-12-03 PROCEDURE — 63600175 PHARM REV CODE 636 W HCPCS: Performed by: ANESTHESIOLOGY

## 2020-12-03 PROCEDURE — 21335 OPEN TX NOSE & SEPTAL FX: CPT | Mod: ,,, | Performed by: OTOLARYNGOLOGY

## 2020-12-03 PROCEDURE — 71000039 HC RECOVERY, EACH ADD'L HOUR: Performed by: OTOLARYNGOLOGY

## 2020-12-03 PROCEDURE — 25000003 PHARM REV CODE 250: Performed by: NURSE ANESTHETIST, CERTIFIED REGISTERED

## 2020-12-03 PROCEDURE — 37000009 HC ANESTHESIA EA ADD 15 MINS: Performed by: OTOLARYNGOLOGY

## 2020-12-03 PROCEDURE — 94640 AIRWAY INHALATION TREATMENT: CPT

## 2020-12-03 PROCEDURE — 36000706: Performed by: OTOLARYNGOLOGY

## 2020-12-03 PROCEDURE — 21335 PR OPEN RX NOSE FX+OPEN FIX SEPTUM: ICD-10-PCS | Mod: ,,, | Performed by: OTOLARYNGOLOGY

## 2020-12-03 PROCEDURE — G0378 HOSPITAL OBSERVATION PER HR: HCPCS

## 2020-12-03 PROCEDURE — 37000008 HC ANESTHESIA 1ST 15 MINUTES: Performed by: OTOLARYNGOLOGY

## 2020-12-03 PROCEDURE — 71000033 HC RECOVERY, INTIAL HOUR: Performed by: OTOLARYNGOLOGY

## 2020-12-03 PROCEDURE — 36000707: Performed by: OTOLARYNGOLOGY

## 2020-12-03 PROCEDURE — 30140 RESECT INFERIOR TURBINATE: CPT | Mod: 50,51,, | Performed by: OTOLARYNGOLOGY

## 2020-12-03 PROCEDURE — 25000003 PHARM REV CODE 250: Performed by: OTOLARYNGOLOGY

## 2020-12-03 PROCEDURE — 94761 N-INVAS EAR/PLS OXIMETRY MLT: CPT

## 2020-12-03 PROCEDURE — 30140 PR EXCISION TURBINATE,SUBMUCOUS: ICD-10-PCS | Mod: 50,51,, | Performed by: OTOLARYNGOLOGY

## 2020-12-03 RX ORDER — FINASTERIDE 5 MG/1
5 TABLET, FILM COATED ORAL DAILY
Status: DISCONTINUED | OUTPATIENT
Start: 2020-12-03 | End: 2020-12-04 | Stop reason: HOSPADM

## 2020-12-03 RX ORDER — OXYCODONE AND ACETAMINOPHEN 5; 325 MG/1; MG/1
1 TABLET ORAL EVERY 4 HOURS PRN
Status: DISCONTINUED | OUTPATIENT
Start: 2020-12-03 | End: 2020-12-04 | Stop reason: HOSPADM

## 2020-12-03 RX ORDER — FENTANYL CITRATE 50 UG/ML
INJECTION, SOLUTION INTRAMUSCULAR; INTRAVENOUS
Status: DISCONTINUED | OUTPATIENT
Start: 2020-12-03 | End: 2020-12-03

## 2020-12-03 RX ORDER — DEXAMETHASONE SODIUM PHOSPHATE 4 MG/ML
INJECTION, SOLUTION INTRA-ARTICULAR; INTRALESIONAL; INTRAMUSCULAR; INTRAVENOUS; SOFT TISSUE
Status: DISCONTINUED | OUTPATIENT
Start: 2020-12-03 | End: 2020-12-03

## 2020-12-03 RX ORDER — DIPHENHYDRAMINE HYDROCHLORIDE 50 MG/ML
25 INJECTION INTRAMUSCULAR; INTRAVENOUS EVERY 6 HOURS PRN
Status: DISCONTINUED | OUTPATIENT
Start: 2020-12-03 | End: 2020-12-03 | Stop reason: HOSPADM

## 2020-12-03 RX ORDER — ONDANSETRON 2 MG/ML
INJECTION INTRAMUSCULAR; INTRAVENOUS
Status: DISCONTINUED | OUTPATIENT
Start: 2020-12-03 | End: 2020-12-03

## 2020-12-03 RX ORDER — BUPIVACAINE HYDROCHLORIDE AND EPINEPHRINE 5; 5 MG/ML; UG/ML
INJECTION, SOLUTION EPIDURAL; INTRACAUDAL; PERINEURAL
Status: DISCONTINUED | OUTPATIENT
Start: 2020-12-03 | End: 2020-12-03 | Stop reason: HOSPADM

## 2020-12-03 RX ORDER — BACITRACIN ZINC 500 UNIT/G
OINTMENT (GRAM) TOPICAL
Status: DISCONTINUED | OUTPATIENT
Start: 2020-12-03 | End: 2020-12-03 | Stop reason: HOSPADM

## 2020-12-03 RX ORDER — MEPERIDINE HYDROCHLORIDE 25 MG/ML
12.5 INJECTION INTRAMUSCULAR; INTRAVENOUS; SUBCUTANEOUS ONCE AS NEEDED
Status: DISCONTINUED | OUTPATIENT
Start: 2020-12-03 | End: 2020-12-03 | Stop reason: HOSPADM

## 2020-12-03 RX ORDER — DIAZEPAM 5 MG/1
5 TABLET ORAL EVERY 12 HOURS PRN
Status: DISCONTINUED | OUTPATIENT
Start: 2020-12-03 | End: 2020-12-04 | Stop reason: HOSPADM

## 2020-12-03 RX ORDER — HYDROMORPHONE HYDROCHLORIDE 2 MG/ML
0.4 INJECTION, SOLUTION INTRAMUSCULAR; INTRAVENOUS; SUBCUTANEOUS EVERY 5 MIN PRN
Status: DISCONTINUED | OUTPATIENT
Start: 2020-12-03 | End: 2020-12-03 | Stop reason: HOSPADM

## 2020-12-03 RX ORDER — ALBUTEROL SULFATE 2.5 MG/.5ML
2.5 SOLUTION RESPIRATORY (INHALATION)
Status: COMPLETED | OUTPATIENT
Start: 2020-12-03 | End: 2020-12-03

## 2020-12-03 RX ORDER — SODIUM CHLORIDE 0.9 % (FLUSH) 0.9 %
3 SYRINGE (ML) INJECTION
Status: DISCONTINUED | OUTPATIENT
Start: 2020-12-03 | End: 2020-12-03

## 2020-12-03 RX ORDER — OXYMETAZOLINE HCL 0.05 %
SPRAY, NON-AEROSOL (ML) NASAL
Status: DISCONTINUED | OUTPATIENT
Start: 2020-12-03 | End: 2020-12-03 | Stop reason: HOSPADM

## 2020-12-03 RX ORDER — LIDOCAINE HYDROCHLORIDE 20 MG/ML
INJECTION INTRAVENOUS
Status: DISCONTINUED | OUTPATIENT
Start: 2020-12-03 | End: 2020-12-03

## 2020-12-03 RX ORDER — PROPRANOLOL HYDROCHLORIDE 10 MG/1
20 TABLET ORAL DAILY PRN
Status: DISCONTINUED | OUTPATIENT
Start: 2020-12-03 | End: 2020-12-03

## 2020-12-03 RX ORDER — ONDANSETRON 2 MG/ML
4 INJECTION INTRAMUSCULAR; INTRAVENOUS DAILY PRN
Status: DISCONTINUED | OUTPATIENT
Start: 2020-12-03 | End: 2020-12-03 | Stop reason: HOSPADM

## 2020-12-03 RX ORDER — MIDAZOLAM HYDROCHLORIDE 1 MG/ML
INJECTION INTRAMUSCULAR; INTRAVENOUS
Status: DISCONTINUED | OUTPATIENT
Start: 2020-12-03 | End: 2020-12-03

## 2020-12-03 RX ORDER — FLUVOXAMINE MALEATE 150 MG/1
150 CAPSULE, EXTENDED RELEASE ORAL NIGHTLY
Status: DISCONTINUED | OUTPATIENT
Start: 2020-12-03 | End: 2020-12-04 | Stop reason: HOSPADM

## 2020-12-03 RX ORDER — PROPOFOL 10 MG/ML
VIAL (ML) INTRAVENOUS
Status: DISCONTINUED | OUTPATIENT
Start: 2020-12-03 | End: 2020-12-03

## 2020-12-03 RX ORDER — ONDANSETRON 4 MG/1
4 TABLET, ORALLY DISINTEGRATING ORAL EVERY 6 HOURS PRN
Status: DISCONTINUED | OUTPATIENT
Start: 2020-12-03 | End: 2020-12-04 | Stop reason: HOSPADM

## 2020-12-03 RX ORDER — ROCURONIUM BROMIDE 10 MG/ML
INJECTION, SOLUTION INTRAVENOUS
Status: DISCONTINUED | OUTPATIENT
Start: 2020-12-03 | End: 2020-12-03

## 2020-12-03 RX ORDER — LIDOCAINE HYDROCHLORIDE AND EPINEPHRINE 10; 10 MG/ML; UG/ML
INJECTION, SOLUTION INFILTRATION; PERINEURAL
Status: DISCONTINUED | OUTPATIENT
Start: 2020-12-03 | End: 2020-12-03 | Stop reason: HOSPADM

## 2020-12-03 RX ORDER — OXYCODONE HYDROCHLORIDE 5 MG/1
5 TABLET ORAL
Status: DISCONTINUED | OUTPATIENT
Start: 2020-12-03 | End: 2020-12-03 | Stop reason: HOSPADM

## 2020-12-03 RX ORDER — SODIUM CHLORIDE, SODIUM LACTATE, POTASSIUM CHLORIDE, CALCIUM CHLORIDE 600; 310; 30; 20 MG/100ML; MG/100ML; MG/100ML; MG/100ML
INJECTION, SOLUTION INTRAVENOUS CONTINUOUS
Status: DISCONTINUED | OUTPATIENT
Start: 2020-12-03 | End: 2020-12-03

## 2020-12-03 RX ADMIN — DEXAMETHASONE SODIUM PHOSPHATE 12 MG: 4 INJECTION, SOLUTION INTRAMUSCULAR; INTRAVENOUS at 01:12

## 2020-12-03 RX ADMIN — LIDOCAINE HYDROCHLORIDE 100 MG: 20 INJECTION, SOLUTION INTRAVENOUS at 12:12

## 2020-12-03 RX ADMIN — GLYCOPYRROLATE 0.2 MG: 0.2 INJECTION, SOLUTION INTRAMUSCULAR; INTRAVITREAL at 01:12

## 2020-12-03 RX ADMIN — SUGAMMADEX 600 MG: 100 INJECTION, SOLUTION INTRAVENOUS at 01:12

## 2020-12-03 RX ADMIN — SODIUM CHLORIDE, SODIUM LACTATE, POTASSIUM CHLORIDE, AND CALCIUM CHLORIDE: 600; 310; 30; 20 INJECTION, SOLUTION INTRAVENOUS at 12:12

## 2020-12-03 RX ADMIN — FENTANYL CITRATE 150 MCG: 50 INJECTION, SOLUTION INTRAMUSCULAR; INTRAVENOUS at 12:12

## 2020-12-03 RX ADMIN — ALBUTEROL SULFATE 2.5 MG: 2.5 SOLUTION RESPIRATORY (INHALATION) at 11:12

## 2020-12-03 RX ADMIN — FENTANYL CITRATE 50 MCG: 50 INJECTION, SOLUTION INTRAMUSCULAR; INTRAVENOUS at 01:12

## 2020-12-03 RX ADMIN — ONDANSETRON HYDROCHLORIDE 4 MG: 2 INJECTION INTRAMUSCULAR; INTRAVENOUS at 01:12

## 2020-12-03 RX ADMIN — PROPOFOL 200 MG: 10 INJECTION, EMULSION INTRAVENOUS at 12:12

## 2020-12-03 RX ADMIN — ROCURONIUM BROMIDE 20 MG: 10 SOLUTION INTRAVENOUS at 01:12

## 2020-12-03 RX ADMIN — ONDANSETRON 4 MG: 4 TABLET, ORALLY DISINTEGRATING ORAL at 09:12

## 2020-12-03 RX ADMIN — ROCURONIUM BROMIDE 50 MG: 10 SOLUTION INTRAVENOUS at 12:12

## 2020-12-03 RX ADMIN — SODIUM CHLORIDE, SODIUM LACTATE, POTASSIUM CHLORIDE, AND CALCIUM CHLORIDE: 600; 310; 30; 20 INJECTION, SOLUTION INTRAVENOUS at 01:12

## 2020-12-03 RX ADMIN — MIDAZOLAM HYDROCHLORIDE 2 MG: 1 INJECTION, SOLUTION INTRAMUSCULAR; INTRAVENOUS at 12:12

## 2020-12-03 RX ADMIN — OXYCODONE HYDROCHLORIDE AND ACETAMINOPHEN 1 TABLET: 5; 325 TABLET ORAL at 09:12

## 2020-12-03 RX ADMIN — DEXTROSE 2 G: 50 INJECTION, SOLUTION INTRAVENOUS at 12:12

## 2020-12-03 NOTE — ANESTHESIA PROCEDURE NOTES
Intubation  Performed by: Scott Justni CRNA  Authorized by: Orestes Hess MD     Intubation:     Induction:  Intravenous    Intubated:  Postinduction    Mask Ventilation:  Easy mask    Attempts:  1    Attempted By:  CRNA    Method of Intubation:  Video laryngoscopy    Laryngeal View Grade: Grade I - full view of chords      Difficult Airway Encountered?: No      Complications:  None    Airway Device:  Oral endotracheal tube    Airway Device Size:  8.0    Style/Cuff Inflation:  Cuffed    Inflation Amount (mL):  4    Tube secured:  22    Secured at:  The lips    Placement Verified By:  Capnometry    Complicating Factors:  None    Findings Post-Intubation:  BS equal bilateral

## 2020-12-03 NOTE — TRANSFER OF CARE
"Anesthesia Transfer of Care Note    Patient: Larry Madden    Procedure(s) Performed: Procedure(s) (LRB):  REPAIR, STENOSIS, NOSE, VESTIBULE (Bilateral)  SEPTOPLASTY, NOSE (Bilateral)  EXCISION, NASAL TURBINATE, SUBMUCOSAL (Bilateral)    Patient location: PACU    Anesthesia Type: general    Transport from OR: Transported from OR on room air with adequate spontaneous ventilation. Transported from OR on 6-10 L/min O2 by face mask with adequate spontaneous ventilation. Transported from OR on 100% O2 by closed face mask with adequate spontaneous ventilation    Post pain: adequate analgesia    Post assessment: no apparent anesthetic complications    Post vital signs: stable    Level of consciousness: awake    Nausea/Vomiting: no nausea/vomiting    Complications: none          Last vitals:   Visit Vitals  /81 (BP Location: Right arm, Patient Position: Lying)   Pulse (!) 114   Temp 36.4 °C (97.5 °F) (Oral)   Resp 20   Ht 5' 9" (1.753 m)   Wt 81.6 kg (180 lb)   SpO2 100%   BMI 26.58 kg/m²     "

## 2020-12-03 NOTE — ANESTHESIA POSTPROCEDURE EVALUATION
Anesthesia Post Evaluation    Patient: Larry Madden    Procedure(s) Performed: Procedure(s) (LRB):  REPAIR, STENOSIS, NOSE, VESTIBULE (Bilateral)  SEPTOPLASTY, NOSE (Bilateral)  EXCISION, NASAL TURBINATE, SUBMUCOSAL (Bilateral)    Final Anesthesia Type: general    Patient location during evaluation: PACU  Patient participation: Yes- Able to Participate  Level of consciousness: awake and alert  Post-procedure vital signs: reviewed and stable  Pain management: adequate  Airway patency: patent  GIOVANNA mitigation strategies: Extubation while patient is awake  PONV status at discharge: No PONV  Anesthetic complications: no      Cardiovascular status: hemodynamically stable  Respiratory status: unassisted  Hydration status: euvolemic  Follow-up not needed.          Vitals Value Taken Time   /69 12/03/20 1551   Temp 36.7 °C (98 °F) 12/03/20 1551   Pulse 98 12/03/20 1551   Resp 16 12/03/20 1551   SpO2 94 % 12/03/20 1551         Event Time   Out of Recovery 15:35:00         Pain/Eneida Score: Pain Rating Post Med Admin: 0 (12/3/2020  3:18 PM)  Eneiad Score: 9 (12/3/2020  3:18 PM)

## 2020-12-03 NOTE — INTERVAL H&P NOTE
The patient has been examined and the H&P has been reviewed:    I concur with the findings and no changes have occurred since H&P was written.    Surgery risks, benefits and alternative options discussed and understood by patient/family.          Active Hospital Problems    Diagnosis  POA    Deviated nasal septum [J34.2]  Yes      Resolved Hospital Problems   No resolved problems to display.

## 2020-12-03 NOTE — OR NURSING
Denies c/o pain to nose.  C/o dry mouth and sore throat.  lozenge given to pt .      1502  Attempted to call mom.  Call went to voicemail. Text a message through system    1516  Waiting for floor nurse to call back to give report to her.  attempted to call  Mom again.  To voicemail. Will text a message with room number

## 2020-12-03 NOTE — OP NOTE
DATE OF PROCEDURE: 12/03/2020    SURGEON: Rg Orantes III, M.D.     ASSISTANT SURGEON: Garrett Doherty M.D. (RES).     PRE-OPERATIVE DIAGNOSIS:  1. Nasal Septal Deviation   2. Inferior Turbinate Hypertrophy  3. Nasal bone fracture     POST-OPERATIVE DIAGNOSIS:  1. Nasal Septal Deviation   2. Inferior Turbinate Hypertrophy  3. Nasal bone fracture     PROCEDURES PERFORMED:   1. Open reduction of nasal bone fracture and Septoplasty, CPT 07479.   2. Submucous resection of the turbinates, CPT 07057-45.     ANESTHESIA: General endotracheal anesthesia.    MEDS AND IV FLUIDS: Please see Anesthesia's report.     SPECIMENS:   None    ESTIMATED BLOOD LOSS: Minimal     COMPLICATIONS: None apparent.     INDICATIONS FOR PROCEDURE: Larry Madden is a 30 y.o. male with history of CPAP intolerance presents to outpatient clinic for evaluation of nasal obstruction despite medical and surgical therapy (previous septoplasty).  On anterior rhinoscopy, patient was noted to have a deviated nasal septum to left with ~90% obstruction and externally has a dorsal deviation to the right. The risks, benefits, and alternatives to open reduction of nasal bone fracture, septoplasty and inferior turbinate reduction were explained to the patient in detail. Pt expressed understanding, and elected to proceed with the aforementioned procedure.     PROCEDURE IN DETAIL: The patient was identified in the preoperative holding area, where informed consent was obtained from the patient. Next, she/he was brought back to the Operative Suite, placed in a supine position, and Anesthesia intubated the patient without incident. The bed was then rotated 90 degrees. The patient was then prepped and draped in usual sterile fashion for septoplasty, submucous resection of turbinates. Approximately,12 mL of lidocaine with epinephrine was used to inject the bilateral septums, as well as the inferior turbinates bilaterally. Next, a curvilinear incision was  made along the left septum and a mucoperichondrial-mucoperiosteal flap was elevated along   the left, back to the level of the bony cartilaginous junction.   The bony septum was removed in previous septoplasty surgery. A septal spur was noted on the left and removed with yvan elevation and a vallejo knife. Reapproximating the 2 mucoperichondrial-mucoperiosteal flaps, septum was noted to be straight with a patent nasal airway. At this point, the septoplasty portion of the procedure was complete. The hemitransfixion incision was closed anteriorly with 4-0 Chromic sutures in simple interrupted fashion. The septum was then coapted with the septal stapler. Next, using the microdebrider, a   submucous resection of the turbinates was carried out bilaterally in usual   fashion. Once adequate reduction of each turbinate was obtained, a Fisher   elevator was utilized to outfracture the inferior turbinates bilaterally.This completed the submucous resection of turbinates portion of the   Procedure.     Attention was then turned to the nasal bone fracture. Medial and lateral osteotomies were made on the right and left, the dorsal deviation was corrected and appeared straight.    Septal splints were inserted and sutured to the septum. Next, nasal packing was inserted bilaterally and sutured with a 4-0 nylon stitch. Finally, an external splint was applied.     At this point, the procedure was deemed complete. The patient's stomach contents were suctioned with an orogastric tube. The patient was then   turned back towards Anesthesia, where he was extubated without incident and brought back to PACU, where she/he is in a stable condition at the time of this dictation. Dr. Orantes was present and performed all portions of this procedure.    Disposition: PACU

## 2020-12-03 NOTE — BRIEF OP NOTE
[facility]  Brief Operative Note    SUMMARY     Surgery Date: 12/3/2020     Surgeon(s) and Role:     * Porter PRAKASH Orantes III, MD - Primary    Assisting Surgeon: None    Pre-op Diagnosis:  Nasal deformity, acquired [M95.0]  GIOVANNA (obstructive sleep apnea) [G47.33]  Nasal septal deviation [J34.2]  Nasal turbinate hypertrophy [J34.3]  Nasal obstruction [J34.89]    Post-op Diagnosis:  Post-Op Diagnosis Codes:     * Nasal deformity, acquired [M95.0]     * GIOVANNA (obstructive sleep apnea) [G47.33]     * Nasal septal deviation [J34.2]     * Nasal turbinate hypertrophy [J34.3]     * Nasal obstruction [J34.89]    Procedure(s) (LRB):  REPAIR, STENOSIS, NOSE, VESTIBULE (Bilateral)  SEPTOPLASTY, NOSE (Bilateral)  REDUCTION, NASAL TURBINATE (Bilateral)    Anesthesia: General    Description of the findings of the procedure: Septal spur on floor on left.    Estimated Blood Loss: * No values recorded between 12/3/2020  1:20 PM and 12/3/2020  2:07 PM *    Estimated Blood Loss has been documented.            Specimens:   Specimen (12h ago, onward)    None          [id number]

## 2020-12-04 VITALS
RESPIRATION RATE: 18 BRPM | SYSTOLIC BLOOD PRESSURE: 119 MMHG | HEIGHT: 69 IN | HEART RATE: 57 BPM | DIASTOLIC BLOOD PRESSURE: 79 MMHG | TEMPERATURE: 98 F | BODY MASS INDEX: 28.79 KG/M2 | OXYGEN SATURATION: 96 % | WEIGHT: 194.38 LBS

## 2020-12-04 PROCEDURE — G0378 HOSPITAL OBSERVATION PER HR: HCPCS

## 2020-12-04 PROCEDURE — 25000003 PHARM REV CODE 250: Performed by: STUDENT IN AN ORGANIZED HEALTH CARE EDUCATION/TRAINING PROGRAM

## 2020-12-04 RX ORDER — CEPHALEXIN 500 MG/1
500 CAPSULE ORAL EVERY 12 HOURS
Qty: 20 CAPSULE | Refills: 0 | Status: SHIPPED | OUTPATIENT
Start: 2020-12-04 | End: 2020-12-14

## 2020-12-04 RX ORDER — OXYCODONE AND ACETAMINOPHEN 5; 325 MG/1; MG/1
1 TABLET ORAL EVERY 6 HOURS PRN
Qty: 40 TABLET | Refills: 0 | Status: SHIPPED | OUTPATIENT
Start: 2020-12-04 | End: 2020-12-16

## 2020-12-04 RX ORDER — ONDANSETRON 4 MG/1
4 TABLET, ORALLY DISINTEGRATING ORAL EVERY 6 HOURS PRN
Qty: 30 TABLET | Refills: 0 | Status: SHIPPED | OUTPATIENT
Start: 2020-12-04 | End: 2020-12-16

## 2020-12-04 RX ADMIN — FINASTERIDE 5 MG: 5 TABLET, FILM COATED ORAL at 09:12

## 2020-12-04 RX ADMIN — OXYCODONE HYDROCHLORIDE AND ACETAMINOPHEN 1 TABLET: 5; 325 TABLET ORAL at 01:12

## 2020-12-04 RX ADMIN — OXYCODONE HYDROCHLORIDE AND ACETAMINOPHEN 1 TABLET: 5; 325 TABLET ORAL at 05:12

## 2020-12-04 NOTE — NURSING
Sitting up in bed to eat dinner. Moustache dressing remove to allow patient to ear. Small amount dried and moist sanguinous drainage noted. Proceeded to eat. Will continue to monitor.

## 2020-12-04 NOTE — DISCHARGE INSTRUCTIONS
Breathe with mouth open, sneeze with mouth OPEN, no blowing nose, no straining. Do NOT remove packing. Your Doctor will remove the packing at your follow up visit.

## 2020-12-04 NOTE — DISCHARGE SUMMARY
[facility]  Short Stay  Discharge Summary    Admit Date: 12/3/2020    Discharge Date and Time:  12/04/2020 6:58 AM      Discharge Attending Physician: Rg Orantes III, MD     Hospital Course: 30 year old male with history of GIOVANNA s/p revision septoplasty, ORNF and SMRT. No acute events over night. Breathing comfortably through mouth. Pain is well tolerated. He is stable for discharge home.    Final Diagnoses:    Principal Problem: Deviated nasal septum    Discharged Condition: good    Disposition: Home or Self Care    Follow up/Patient Instructions:    Medications:  Reconciled Home Medications:      Medication List      START taking these medications    cephALEXin 500 MG capsule  Commonly known as: KEFLEX  Take 1 capsule (500 mg total) by mouth every 12 (twelve) hours. for 10 days     ondansetron 4 MG Tbdl  Commonly known as: ZOFRAN-ODT  Take 1 tablet (4 mg total) by mouth every 6 (six) hours as needed.     oxyCODONE-acetaminophen 5-325 mg per tablet  Commonly known as: PERCOCET  Take 1 tablet by mouth every 6 (six) hours as needed for Pain.        CONTINUE taking these medications    cetirizine 10 MG tablet  Commonly known as: ZYRTEC  Take 10 mg by mouth.     diazePAM 5 MG tablet  Commonly known as: VALIUM  Take 5 mg by mouth every 12 (twelve) hours as needed for Anxiety.     finasteride 5 mg tablet  Commonly known as: PROSCAR  TK 1/2 T PO EVERY OTHER DAY     fluticasone propionate 50 mcg/actuation nasal spray  Commonly known as: FLONASE  1 spray (50 mcg total) by Each Nare route once daily.     * fluvoxaMINE 100 mg Cp24     * fluvoxaMINE 150 mg 24 hr capsule  Commonly known as: LUVOX     montelukast 10 mg tablet  Commonly known as: SINGULAIR  Take 10 mg by mouth every evening.     propranoloL 20 MG tablet  Commonly known as: INDERAL  TK 1 T PO  ONCE D PRF PERFORMANCE ANXIETY         * This list has 2 medication(s) that are the same as other medications prescribed for you. Read the directions  carefully, and ask your doctor or other care provider to review them with you.              Discharge Procedure Orders   Diet Adult Regular     Other restrictions (specify):   Order Comments: Breath through mouth, sneeze with mouth open, no blowing nose, no straining

## 2020-12-04 NOTE — NURSING
Arrived to unit per stretcher. Able to scoot from stretcher to bed. Packing noted to bilateral nostrils. Moustache dressing dry and intact. Left hand IV site intact. Oriented to room and facility. Bed in lowest position. Brakes engaged. Bed alarm enabled. Call button in reach.

## 2020-12-07 ENCOUNTER — TELEPHONE (OUTPATIENT)
Dept: OTOLARYNGOLOGY | Facility: CLINIC | Age: 30
End: 2020-12-07

## 2020-12-07 NOTE — TELEPHONE ENCOUNTER
Returned pt call. LM on VM.     ----- Message from Denisse Howard sent at 12/7/2020  9:15 AM CST -----  Contact: SIDNEY SORENSEN [[id number]]  Type: Patient Call Back    Who called:SIDNEY SORENSEN [[id number]]    What is the request in detail: Patient is requesting a call back. SIDNEY SORENSEN requests to know if he can be seen for his POST-OP on 12/08/2020 instead of 12/09/2020. [[id number]]  Please advise.    Can the clinic reply by [facility]? No    Best call back number: ..[phone]    Additional Information: N/A

## 2020-12-08 ENCOUNTER — TELEPHONE (OUTPATIENT)
Dept: OTOLARYNGOLOGY | Facility: CLINIC | Age: 30
End: 2020-12-08

## 2020-12-08 ENCOUNTER — OFFICE VISIT (OUTPATIENT)
Dept: OTOLARYNGOLOGY | Facility: CLINIC | Age: 30
End: 2020-12-08
Payer: COMMERCIAL

## 2020-12-08 DIAGNOSIS — Z98.890 POST-OPERATIVE STATE: ICD-10-CM

## 2020-12-08 DIAGNOSIS — J34.2 NASAL SEPTAL DEVIATION: ICD-10-CM

## 2020-12-08 DIAGNOSIS — M95.0 NASAL DEFORMITY, ACQUIRED: Primary | ICD-10-CM

## 2020-12-08 PROCEDURE — 99024 PR POST-OP FOLLOW-UP VISIT: ICD-10-PCS | Mod: S$GLB,,, | Performed by: OTOLARYNGOLOGY

## 2020-12-08 PROCEDURE — 99024 POSTOP FOLLOW-UP VISIT: CPT | Mod: S$GLB,,, | Performed by: OTOLARYNGOLOGY

## 2020-12-08 NOTE — PROGRESS NOTES
Five days S/P ORNF septoplasty,SMR turbs.  All packs and splints removed.  Septum straight,airway clear. Nasal bones malleable, moderate edema.  He C/O constipation and has tried Miralax and Stool softener   Patient states that he was exposed to a friend at a restaurant that tested positive for Covid.  He is not symptomatic. He wishes to get tested.  He will F/U with his PCP regarding this.  Postop Instructions including massage his are reviewed with him. With his severe dorsal deviation to his right I have emphasized the need for very firm molding exercises pushing his nasal bones to his left, as bones and cartilage tend to seek their previous position. I have demonstrated these massages thoroughly.  Plan: Saline spray/gel  Dulcolax suppository  RTC 3 weeks for F/U.

## 2020-12-08 NOTE — TELEPHONE ENCOUNTER
Returned pt call. Rescheduled pt per pt's request.     ----- Message from Ryanne Ivey sent at 12/8/2020  8:38 AM CST -----  Contact: SIDNEY SORENSEN [[id number]]  Type:  Patient Returning Call Who Called:  SIDNEY SORENSEN [[id number]] Who Left Message for Patient: Sharla Does the patient know what this is regarding?: yes Would the patient rather a call back or a response via [facility]?  Call back Best Call Back Number:[phone] ([place])   Additional Information:

## 2020-12-16 ENCOUNTER — OFFICE VISIT (OUTPATIENT)
Dept: NEUROLOGY | Facility: CLINIC | Age: 30
End: 2020-12-16
Payer: COMMERCIAL

## 2020-12-16 ENCOUNTER — TELEPHONE (OUTPATIENT)
Dept: NEUROLOGY | Facility: CLINIC | Age: 30
End: 2020-12-16

## 2020-12-16 ENCOUNTER — PATIENT MESSAGE (OUTPATIENT)
Dept: PRIMARY CARE CLINIC | Facility: CLINIC | Age: 30
End: 2020-12-16

## 2020-12-16 VITALS
HEART RATE: 89 BPM | WEIGHT: 185.19 LBS | HEIGHT: 69 IN | SYSTOLIC BLOOD PRESSURE: 122 MMHG | DIASTOLIC BLOOD PRESSURE: 76 MMHG | BODY MASS INDEX: 27.43 KG/M2

## 2020-12-16 DIAGNOSIS — G44.52 NEW DAILY PERSISTENT HEADACHE: ICD-10-CM

## 2020-12-16 DIAGNOSIS — R51.9 ACUTE NONINTRACTABLE HEADACHE, UNSPECIFIED HEADACHE TYPE: ICD-10-CM

## 2020-12-16 DIAGNOSIS — G43.009 MIGRAINE WITHOUT AURA AND WITHOUT STATUS MIGRAINOSUS, NOT INTRACTABLE: Primary | ICD-10-CM

## 2020-12-16 DIAGNOSIS — F41.1 ANXIETY STATE: ICD-10-CM

## 2020-12-16 DIAGNOSIS — G47.33 OSA (OBSTRUCTIVE SLEEP APNEA): ICD-10-CM

## 2020-12-16 PROCEDURE — 99214 OFFICE O/P EST MOD 30 MIN: CPT | Mod: S$GLB,,, | Performed by: NURSE PRACTITIONER

## 2020-12-16 PROCEDURE — 99214 PR OFFICE/OUTPT VISIT, EST, LEVL IV, 30-39 MIN: ICD-10-PCS | Mod: S$GLB,,, | Performed by: NURSE PRACTITIONER

## 2020-12-16 PROCEDURE — 99999 PR PBB SHADOW E&M-EST. PATIENT-LVL III: ICD-10-PCS | Mod: PBBFAC,,, | Performed by: NURSE PRACTITIONER

## 2020-12-16 PROCEDURE — 99999 PR PBB SHADOW E&M-EST. PATIENT-LVL III: CPT | Mod: PBBFAC,,, | Performed by: NURSE PRACTITIONER

## 2020-12-16 RX ORDER — UBROGEPANT 100 MG/1
100 TABLET ORAL
Qty: 10 TABLET | Refills: 3 | Status: SHIPPED | OUTPATIENT
Start: 2020-12-16 | End: 2020-12-29 | Stop reason: SDUPTHER

## 2020-12-16 NOTE — TELEPHONE ENCOUNTER
1013: Was able to schedule patient for new pt appt for 12/16 @ 4:00pm with neurology provider at [facility].

## 2020-12-16 NOTE — PROGRESS NOTES
"Seen today neurology dept for new onset headaches x 4 wks. + increased anxiety, having psychiatry visit tomorrow. They have been daily, located frontally pulsating sensation with poor focus, photophobia and phonophobia, mild blurred vision, rates 4/10 on VAS today, overall mild-moderate. They were more intense after septoplasty 12/3/20 but now back to baseline level. With headaches it is hard to work and affects life, can't watch tv/avoids. OTC tylenol ineffective and zomig 2.5mg lessened pain but not completely aborted. No hx head imaging.     Seeing Dr. Acosta GIOVANNA. Wants requal HST given recent surgery  Sleeping better, feels refreshed upon awakening since surgery, packing removed ~ 2 wk ago,   Last used apap 11/2017 more on weekends longer sleep/consolidated and AHI <5 per Encore review today. Still c/o pressure intolerance  Feels his tongue may still be blocking airway  HAD 2 negative COVID tests w/i past month    TSH, CMP, CBC normal 11/2020    Working/living more [place] now (otherwise [place])  SH:         HX vb 4/30/2020  He has since gotten setup with apap 6-20cm 1/31/20. Used it ~2 wks and has few good consolidated nights but mostly would remove mask and either reapply I the beginning but the last week of use was rough/would rip off after 3-4hr. Feels pressure too high. Was using nose mask and has deviated septum. He then went to [place] (prior to F2F visit) and has been quarantined there due to Covid. He is sleeping on R side but is very tired upon awakening. "It is crippling". He has been prescribed Adderall XR ? 10mg since in [place] (taken few times and it helped focus and he didn't have to drink 2-3c coffee anymore like he does when he doesn't take it). Interested in alterative treatments for sleep apnea. Has lost 20# since seen!!    Denies cataplexy, sleep hallucinations, vivid dreams or sleep paralysis   "lifelong" excessive daytime sleepiness  Can fall asleep very quickly within 1min  Falls " "asleep if sitting in dark room/can't go to movies  At 10p he has irrepressible urge to sleep  2-3c coffee qd  Occasional sleep disruption    Encore:  DATE RANGE: 1/31/2020 - 2/18/2020   0% leak  90% tile 7.4cm  Compliance Summary  Days with Device Usage: 13 days  Percentage of Days >=4 Hours: 36.8%  Average Usage (Days Used): 4 hrs. 18 mins. 54 secs.  Average Usage (All Days): 2 hrs. 57 mins. 8 secs.  Apnea Indices  Average AHI: 2.7    AHI 8(RDI 23)/low sat 92.4%    7/2019 ESS=12    ROS In addition to above. Otherwise a balance review of 10-systems is negative. + sleep talking since childhood.    PHYSICAL EXAM:  /76   Pulse 89   Ht 5' 9" (1.753 m)   Wt 84 kg (185 lb 3 oz)   BMI 27.35 kg/m²   GENERAL: Normal development, well groomed  Alert, attentive, oriented to person, place and time  Physical Exam  Constitutional:       General: He is not in acute distress.     Appearance: Normal appearance. He is not toxic-appearing.   HENT:      Head: Normocephalic.   Eyes:      Extraocular Movements: Extraocular movements intact.      Conjunctiva/sclera: Conjunctivae normal.      Pupils: Pupils are equal, round, and reactive to light.   Cardiovascular:      Rate and Rhythm: Normal rate and regular rhythm.   Pulmonary:      Effort: Pulmonary effort is normal. No respiratory distress.   Musculoskeletal: Normal range of motion.   Neurological:      General: No focal deficit present.      Mental Status: He is alert and oriented to person, place, and time.      Cranial Nerves: No cranial nerve deficit.      Motor: No weakness.      Coordination: Coordination normal.      Gait: Gait normal.   Psychiatric:         Mood and Affect: Mood normal.         Behavior: Behavior normal.         Thought Content: Thought content normal.         Judgment: Judgment normal.             ASSESSMENT:    1. GIOVANNA, mild (mod by RDI).Having pressure intolerance, difficulty using PAP due to mask removal. HAD recent septoplasty and sleeping " reports is better, needs requal HST  He has medical co-morbidities of anxiety /OCD which can be worsened by GIOVANNA  2. Headache w/o aura  3. New daily persistent headache    PLAN:  APAP 6-20cm attempt resume after ok with ENT  See psychiatrist tomorrow as planned. Inquire about GABAPENTIN 100mg qhs with up-titration q 5-7d by 100mg cap to potential goal 300mgqhs.STAY at lowest effective dose to help mood/headache  Ubrelvy 100mgpo q2h prn +- 2 ES tylenol or 400mg Ibuprofen or could retrial Zomig 2.5mg +- nSAIDS for improved effectiveness. Max 10mg/24hrs  MRI brain w/o assess mass/lesion  RTC accordingly

## 2020-12-16 NOTE — TELEPHONE ENCOUNTER
0850: Attempted to call pt to reschedule appt that was scheduled by pt through portal incorrectly. Unable to leave message. Will attempt to reach again.

## 2020-12-17 ENCOUNTER — PATIENT MESSAGE (OUTPATIENT)
Dept: NEUROLOGY | Facility: CLINIC | Age: 30
End: 2020-12-17

## 2020-12-17 ENCOUNTER — HOSPITAL ENCOUNTER (OUTPATIENT)
Dept: RADIOLOGY | Facility: OTHER | Age: 30
Discharge: HOME | End: 2020-12-17
Attending: NURSE PRACTITIONER
Payer: COMMERCIAL

## 2020-12-17 DIAGNOSIS — R51.9 ACUTE NONINTRACTABLE HEADACHE, UNSPECIFIED HEADACHE TYPE: ICD-10-CM

## 2020-12-17 DIAGNOSIS — G43.009 MIGRAINE WITHOUT AURA AND WITHOUT STATUS MIGRAINOSUS, NOT INTRACTABLE: ICD-10-CM

## 2020-12-17 DIAGNOSIS — G43.009 MIGRAINE WITHOUT AURA AND WITHOUT STATUS MIGRAINOSUS, NOT INTRACTABLE: Primary | ICD-10-CM

## 2020-12-17 DIAGNOSIS — F41.1 ANXIETY STATE: ICD-10-CM

## 2020-12-17 PROCEDURE — 70551 MRI BRAIN STEM W/O DYE: CPT | Mod: TC

## 2020-12-17 PROCEDURE — 70551 MRI BRAIN WITHOUT CONTRAST: ICD-10-PCS | Mod: 26,,, | Performed by: RADIOLOGY

## 2020-12-17 PROCEDURE — 70551 MRI BRAIN STEM W/O DYE: CPT | Mod: 26,,, | Performed by: RADIOLOGY

## 2020-12-17 RX ORDER — GABAPENTIN 100 MG/1
100 CAPSULE ORAL SEE ADMIN INSTRUCTIONS
Qty: 90 CAPSULE | Refills: 5 | Status: SHIPPED | OUTPATIENT
Start: 2020-12-17 | End: 2021-02-10 | Stop reason: DRUGHIGH

## 2020-12-22 ENCOUNTER — OFFICE VISIT (OUTPATIENT)
Dept: OTOLARYNGOLOGY | Facility: CLINIC | Age: 30
End: 2020-12-22
Payer: COMMERCIAL

## 2020-12-22 ENCOUNTER — OFFICE VISIT (OUTPATIENT)
Dept: DERMATOLOGY | Facility: CLINIC | Age: 30
End: 2020-12-22
Payer: COMMERCIAL

## 2020-12-22 ENCOUNTER — TELEPHONE (OUTPATIENT)
Dept: SLEEP MEDICINE | Facility: OTHER | Age: 30
End: 2020-12-22

## 2020-12-22 VITALS — HEIGHT: 69 IN | WEIGHT: 179.5 LBS | BODY MASS INDEX: 26.59 KG/M2

## 2020-12-22 DIAGNOSIS — D22.9 NUMEROUS MOLES: ICD-10-CM

## 2020-12-22 DIAGNOSIS — Z98.890 POST-OPERATIVE STATE: ICD-10-CM

## 2020-12-22 DIAGNOSIS — L85.8 KP (KERATOSIS PILARIS): ICD-10-CM

## 2020-12-22 DIAGNOSIS — M95.0 NASAL DEFORMITY, ACQUIRED: Primary | ICD-10-CM

## 2020-12-22 DIAGNOSIS — J34.89 NASAL OBSTRUCTION: ICD-10-CM

## 2020-12-22 DIAGNOSIS — D22.9 BENIGN MOLE: ICD-10-CM

## 2020-12-22 DIAGNOSIS — J34.2 NASAL SEPTAL DEVIATION: ICD-10-CM

## 2020-12-22 DIAGNOSIS — L90.5 SCAR: Primary | ICD-10-CM

## 2020-12-22 PROCEDURE — 99999 PR PBB SHADOW E&M-EST. PATIENT-LVL III: CPT | Mod: PBBFAC,,, | Performed by: DERMATOLOGY

## 2020-12-22 PROCEDURE — 99203 OFFICE O/P NEW LOW 30 MIN: CPT | Mod: S$GLB,,, | Performed by: DERMATOLOGY

## 2020-12-22 PROCEDURE — 99024 POSTOP FOLLOW-UP VISIT: CPT | Mod: S$GLB,,, | Performed by: OTOLARYNGOLOGY

## 2020-12-22 PROCEDURE — 99203 PR OFFICE/OUTPT VISIT, NEW, LEVL III, 30-44 MIN: ICD-10-PCS | Mod: S$GLB,,, | Performed by: DERMATOLOGY

## 2020-12-22 PROCEDURE — 99024 PR POST-OP FOLLOW-UP VISIT: ICD-10-PCS | Mod: S$GLB,,, | Performed by: OTOLARYNGOLOGY

## 2020-12-22 PROCEDURE — 99999 PR PBB SHADOW E&M-EST. PATIENT-LVL III: ICD-10-PCS | Mod: PBBFAC,,, | Performed by: DERMATOLOGY

## 2020-12-22 NOTE — PROGRESS NOTES
Four weeks S/P ORNF septoplasty,SMR turbs.  He states he is breathing very well.  Septum straight,airway clear. Nasal bones malleable, persistent moderate edema though as this settles his nasal dorsum is becoming straighter.  Postop Instructions including massage his are reviewed again with him. With his severe dorsal deviation to his right I have again emphasized the need for very firm molding exercises pushing his nasal bones to his left, as bones and cartilage tend to seek their previous position. I have demonstrated these massages thoroughly.  Plan: Saline spray/gel  RTC: 2 months or sooner prn.

## 2020-12-22 NOTE — PROGRESS NOTES
Subjective:       Patient ID:  Larry Madden is a 30 y.o. male who presents for   Chief Complaint   Patient presents with    Skin Check     TBSE     Pt co old scar of the l elbow.  Used scar pads.    Has break out on and off on the buttocks.    Has moles all over.  No bleeding or change in color.      Review of Systems   Constitutional: Negative for fever and chills.   Respiratory: Negative for cough and shortness of breath.    Skin: Positive for rash and activity-related sunscreen use. Negative for tendency to form keloidal scars.   Hematologic/Lymphatic: Does not bruise/bleed easily.        Objective:    Physical Exam   Constitutional: He appears well-developed and well-nourished. No distress.   Eyes: No conjunctival no injection.   Cardiovascular: There is no dependent edema.     Neurological: He is alert and oriented to person, place, and time. He is not disoriented.   Psychiatric: He has a normal mood and affect.   Skin:   Areas Examined (abnormalities noted in diagram):   Scalp / Hair Palpated and Inspected  Head / Face Inspection Performed  Neck Inspection Performed  Chest / Axilla Inspection Performed  Abdomen Inspection Performed  Genitals / Buttocks / Groin Inspection Performed  Back Inspection Performed  RUE Inspected  LUE Inspection Performed  RLE Inspected  LLE Inspection Performed  Nails and Digits Inspection Performed                       Diagram Legend     Erythematous scaling macule/papule c/w actinic keratosis       Vascular papule c/w angioma      Pigmented verrucoid papule/plaque c/w seborrheic keratosis      Yellow umbilicated papule c/w sebaceous hyperplasia      Irregularly shaped tan macule c/w lentigo     1-2 mm smooth white papules consistent with Milia      Movable subcutaneous cyst with punctum c/w epidermal inclusion cyst      Subcutaneous movable cyst c/w pilar cyst      Firm pink to brown papule c/w dermatofibroma      Pedunculated fleshy papule(s) c/w skin tag(s)       Evenly pigmented macule c/w junctional nevus     Mildly variegated pigmented, slightly irregular-bordered macule c/w mildly atypical nevus      Flesh colored to evenly pigmented papule c/w intradermal nevus       Pink pearly papule/plaque c/w basal cell carcinoma      Erythematous hyperkeratotic cursted plaque c/w SCC      Surgical scar with no sign of skin cancer recurrence      Open and closed comedones      Inflammatory papules and pustules      Verrucoid papule consistent consistent with wart     Erythematous eczematous patches and plaques     Dystrophic onycholytic nail with subungual debris c/w onychomycosis     Umbilicated papule    Erythematous-base heme-crusted tan verrucoid plaque consistent with inflamed seborrheic keratosis     Erythematous Silvery Scaling Plaque c/w Psoriasis     See annotation      Assessment / Plan:        Scar  Discussed with patient the benign nature of these lesions and that no treatment is indicated.    Consider microneedling with Dr. Eid.    Numerous moles  Discussed all of the following with the patient.    Patient to check their breasts (if applicable), buttocks, and groin with a mirror.  Patient deferred our examination of these areas today.    Each month, check your body for any spots such as freckles, age spots, and moles.  Watch for color changes and shape changes and growth in size.    Have your zaragoza or  pay attention to any dark color changes to moles of the scalp.    Moles, also called nevi, are small, colored (pigmented) marks on the skin. They have no known purpose. Many moles appear before age 30, but they also increase frequently as people age. Moles most often are not cancer (benign) and are harmless. But some become cancerous (malignant). Thats why you need to watch the moles on your body and tell your healthcare provider about any that concern you.    KP (keratosis pilaris)  Etiology of keratosis pilaris discussed and explained the dry skin  plugging of the pores.  Recommended salicylic acid washes for now with avoidance of excessively hot water bathing on affected areas.  Can consider moisturizers and natural oils later.  Discussed getting and usage of a scrub brush for the back and loofa for the chest and shoulders and other body parts.    Benign mole  Discussed with patient the benign nature of these lesions and that no treatment is indicated.    We will recheck the l toe at our follow up appointment.             Follow up in about 1 year (around 12/22/2021).

## 2020-12-24 ENCOUNTER — PATIENT MESSAGE (OUTPATIENT)
Dept: NEUROLOGY | Facility: CLINIC | Age: 30
End: 2020-12-24

## 2020-12-29 DIAGNOSIS — G43.009 MIGRAINE WITHOUT AURA AND WITHOUT STATUS MIGRAINOSUS, NOT INTRACTABLE: ICD-10-CM

## 2020-12-29 RX ORDER — UBROGEPANT 100 MG/1
100 TABLET ORAL
Qty: 30 TABLET | Refills: 3 | Status: SHIPPED | OUTPATIENT
Start: 2020-12-29 | End: 2022-06-25

## 2020-12-29 RX ORDER — UBROGEPANT 100 MG/1
100 TABLET ORAL
Qty: 30 TABLET | Refills: 3 | Status: SHIPPED | OUTPATIENT
Start: 2020-12-29 | End: 2020-12-29 | Stop reason: SDUPTHER

## 2021-01-04 ENCOUNTER — TELEPHONE (OUTPATIENT)
Dept: SLEEP MEDICINE | Facility: OTHER | Age: 31
End: 2021-01-04

## 2021-01-05 ENCOUNTER — HOSPITAL ENCOUNTER (OUTPATIENT)
Dept: SLEEP MEDICINE | Facility: OTHER | Age: 31
Discharge: HOME | End: 2021-01-05
Attending: NURSE PRACTITIONER
Payer: COMMERCIAL

## 2021-01-05 DIAGNOSIS — G47.20 SLEEP STAGE DYSFUNCTION: ICD-10-CM

## 2021-01-05 DIAGNOSIS — G47.33 OSA (OBSTRUCTIVE SLEEP APNEA): ICD-10-CM

## 2021-01-05 PROCEDURE — 95800 SLP STDY UNATTENDED: CPT | Mod: 26,,, | Performed by: PSYCHIATRY & NEUROLOGY

## 2021-01-05 PROCEDURE — 95800 PR SLEEP STUDY, UNATTENDED, RECORD HEART RATE/O2 SAT/RESP ANAL/SLEEP TIME: ICD-10-PCS | Mod: 26,,, | Performed by: PSYCHIATRY & NEUROLOGY

## 2021-01-05 PROCEDURE — 95800 SLP STDY UNATTENDED: CPT

## 2021-01-11 ENCOUNTER — TELEPHONE (OUTPATIENT)
Dept: SLEEP MEDICINE | Facility: CLINIC | Age: 31
End: 2021-01-11

## 2021-01-12 ENCOUNTER — OFFICE VISIT (OUTPATIENT)
Dept: SLEEP MEDICINE | Facility: CLINIC | Age: 31
End: 2021-01-12
Payer: COMMERCIAL

## 2021-01-12 DIAGNOSIS — G44.52 NEW DAILY PERSISTENT HEADACHE: ICD-10-CM

## 2021-01-12 DIAGNOSIS — F41.1 ANXIETY STATE: ICD-10-CM

## 2021-01-12 DIAGNOSIS — G47.33 OBSTRUCTIVE SLEEP APNEA: ICD-10-CM

## 2021-01-12 DIAGNOSIS — G47.30 SLEEP APNEA, UNSPECIFIED TYPE: Primary | ICD-10-CM

## 2021-01-12 DIAGNOSIS — G47.33 OSA (OBSTRUCTIVE SLEEP APNEA): Primary | ICD-10-CM

## 2021-01-12 PROCEDURE — 99214 PR OFFICE/OUTPT VISIT, EST, LEVL IV, 30-39 MIN: ICD-10-PCS | Mod: 95,,, | Performed by: NURSE PRACTITIONER

## 2021-01-12 PROCEDURE — 99214 OFFICE O/P EST MOD 30 MIN: CPT | Mod: 95,,, | Performed by: NURSE PRACTITIONER

## 2021-01-12 RX ORDER — RIMEGEPANT SULFATE 75 MG/75MG
75 TABLET, ORALLY DISINTEGRATING ORAL ONCE AS NEEDED
Qty: 8 TABLET | Refills: 3 | Status: SHIPPED | OUTPATIENT
Start: 2021-01-12 | End: 2021-01-12

## 2021-01-22 ENCOUNTER — PATIENT MESSAGE (OUTPATIENT)
Dept: PAIN MEDICINE | Facility: CLINIC | Age: 31
End: 2021-01-22

## 2021-01-27 ENCOUNTER — TELEPHONE (OUTPATIENT)
Dept: SLEEP MEDICINE | Facility: OTHER | Age: 31
End: 2021-01-27

## 2021-01-28 ENCOUNTER — PATIENT MESSAGE (OUTPATIENT)
Dept: PRIMARY CARE CLINIC | Facility: CLINIC | Age: 31
End: 2021-01-28

## 2021-01-28 ENCOUNTER — HOSPITAL ENCOUNTER (OUTPATIENT)
Dept: SLEEP MEDICINE | Facility: OTHER | Age: 31
Discharge: HOME | End: 2021-01-28
Attending: NURSE PRACTITIONER
Payer: COMMERCIAL

## 2021-01-28 DIAGNOSIS — G47.33 OBSTRUCTIVE SLEEP APNEA: ICD-10-CM

## 2021-01-28 PROCEDURE — 95810 POLYSOM 6/> YRS 4/> PARAM: CPT

## 2021-01-28 PROCEDURE — 95810 PR POLYSOMNOGRAPHY, 4 OR MORE: ICD-10-PCS | Mod: 26,,, | Performed by: INTERNAL MEDICINE

## 2021-01-28 PROCEDURE — 95810 POLYSOM 6/> YRS 4/> PARAM: CPT | Mod: 26,,, | Performed by: INTERNAL MEDICINE

## 2021-02-04 ENCOUNTER — TELEPHONE (OUTPATIENT)
Dept: SLEEP MEDICINE | Facility: CLINIC | Age: 31
End: 2021-02-04

## 2021-02-10 ENCOUNTER — OFFICE VISIT (OUTPATIENT)
Dept: SLEEP MEDICINE | Facility: CLINIC | Age: 31
End: 2021-02-10
Payer: COMMERCIAL

## 2021-02-10 DIAGNOSIS — R51.9 NONINTRACTABLE HEADACHE, UNSPECIFIED CHRONICITY PATTERN, UNSPECIFIED HEADACHE TYPE: ICD-10-CM

## 2021-02-10 DIAGNOSIS — F41.1 ANXIETY STATE: ICD-10-CM

## 2021-02-10 DIAGNOSIS — G43.009 MIGRAINE WITHOUT AURA AND WITHOUT STATUS MIGRAINOSUS, NOT INTRACTABLE: Primary | ICD-10-CM

## 2021-02-10 DIAGNOSIS — G47.33 OBSTRUCTIVE SLEEP APNEA: ICD-10-CM

## 2021-02-10 PROCEDURE — 99214 PR OFFICE/OUTPT VISIT, EST, LEVL IV, 30-39 MIN: ICD-10-PCS | Mod: 95,,, | Performed by: NURSE PRACTITIONER

## 2021-02-10 PROCEDURE — 99214 OFFICE O/P EST MOD 30 MIN: CPT | Mod: 95,,, | Performed by: NURSE PRACTITIONER

## 2021-02-10 RX ORDER — RIMEGEPANT SULFATE 75 MG/75MG
75 TABLET, ORALLY DISINTEGRATING ORAL ONCE AS NEEDED
COMMUNITY
End: 2022-04-18 | Stop reason: CLARIF

## 2021-02-10 RX ORDER — GABAPENTIN 300 MG/1
300 CAPSULE ORAL NIGHTLY
Qty: 30 CAPSULE | Refills: 3 | Status: SHIPPED | OUTPATIENT
Start: 2021-02-10 | End: 2021-05-18 | Stop reason: SDUPTHER

## 2021-02-22 ENCOUNTER — OFFICE VISIT (OUTPATIENT)
Dept: OTOLARYNGOLOGY | Facility: CLINIC | Age: 31
End: 2021-02-22
Payer: COMMERCIAL

## 2021-02-22 VITALS
DIASTOLIC BLOOD PRESSURE: 78 MMHG | SYSTOLIC BLOOD PRESSURE: 121 MMHG | WEIGHT: 191.81 LBS | HEIGHT: 69 IN | TEMPERATURE: 97 F | HEART RATE: 83 BPM | BODY MASS INDEX: 28.41 KG/M2

## 2021-02-22 DIAGNOSIS — Z98.890 POST-OPERATIVE STATE: ICD-10-CM

## 2021-02-22 DIAGNOSIS — M95.0 NASAL DEFORMITY, ACQUIRED: Primary | ICD-10-CM

## 2021-02-22 DIAGNOSIS — J34.2 NASAL SEPTAL DEVIATION: ICD-10-CM

## 2021-02-22 PROCEDURE — 99024 POSTOP FOLLOW-UP VISIT: CPT | Mod: S$GLB,,, | Performed by: OTOLARYNGOLOGY

## 2021-02-22 PROCEDURE — 99024 PR POST-OP FOLLOW-UP VISIT: ICD-10-PCS | Mod: S$GLB,,, | Performed by: OTOLARYNGOLOGY

## 2021-03-22 ENCOUNTER — OFFICE VISIT (OUTPATIENT)
Dept: OTOLARYNGOLOGY | Facility: CLINIC | Age: 31
End: 2021-03-22
Payer: COMMERCIAL

## 2021-03-22 VITALS
DIASTOLIC BLOOD PRESSURE: 77 MMHG | TEMPERATURE: 98 F | SYSTOLIC BLOOD PRESSURE: 117 MMHG | BODY MASS INDEX: 27.08 KG/M2 | WEIGHT: 183.38 LBS | HEART RATE: 69 BPM

## 2021-03-22 DIAGNOSIS — J34.2 NASAL SEPTAL DEVIATION: ICD-10-CM

## 2021-03-22 DIAGNOSIS — Z98.890 POST-OPERATIVE STATE: ICD-10-CM

## 2021-03-22 DIAGNOSIS — M95.0 NASAL DEFORMITY, ACQUIRED: Primary | ICD-10-CM

## 2021-03-22 PROCEDURE — 99024 POSTOP FOLLOW-UP VISIT: CPT | Mod: S$GLB,,, | Performed by: OTOLARYNGOLOGY

## 2021-03-22 PROCEDURE — 99024 PR POST-OP FOLLOW-UP VISIT: ICD-10-PCS | Mod: S$GLB,,, | Performed by: OTOLARYNGOLOGY

## 2021-04-16 ENCOUNTER — PATIENT MESSAGE (OUTPATIENT)
Dept: RESEARCH | Facility: HOSPITAL | Age: 31
End: 2021-04-16

## 2021-04-28 ENCOUNTER — OFFICE VISIT (OUTPATIENT)
Dept: OTOLARYNGOLOGY | Facility: CLINIC | Age: 31
End: 2021-04-28
Payer: COMMERCIAL

## 2021-04-28 VITALS
HEART RATE: 76 BPM | SYSTOLIC BLOOD PRESSURE: 105 MMHG | TEMPERATURE: 98 F | WEIGHT: 185.69 LBS | BODY MASS INDEX: 27.42 KG/M2 | DIASTOLIC BLOOD PRESSURE: 74 MMHG

## 2021-04-28 DIAGNOSIS — Z98.890 POST-OPERATIVE STATE: ICD-10-CM

## 2021-04-28 DIAGNOSIS — R04.0 EPISTAXIS: ICD-10-CM

## 2021-04-28 DIAGNOSIS — M95.0 NASAL DEFORMITY, ACQUIRED: Primary | ICD-10-CM

## 2021-04-28 PROCEDURE — 99024 POSTOP FOLLOW-UP VISIT: CPT | Mod: S$GLB,,, | Performed by: OTOLARYNGOLOGY

## 2021-04-28 PROCEDURE — 30901 PR CTRL 2SEBLEED,ANTER,SIMPLE: ICD-10-PCS | Mod: RT,S$GLB,, | Performed by: OTOLARYNGOLOGY

## 2021-04-28 PROCEDURE — 30901 CONTROL OF NOSEBLEED: CPT | Mod: RT,S$GLB,, | Performed by: OTOLARYNGOLOGY

## 2021-04-28 PROCEDURE — 99024 PR POST-OP FOLLOW-UP VISIT: ICD-10-PCS | Mod: S$GLB,,, | Performed by: OTOLARYNGOLOGY

## 2021-04-30 ENCOUNTER — TELEPHONE (OUTPATIENT)
Dept: SLEEP MEDICINE | Facility: CLINIC | Age: 31
End: 2021-04-30

## 2021-05-05 ENCOUNTER — PATIENT MESSAGE (OUTPATIENT)
Dept: SLEEP MEDICINE | Facility: CLINIC | Age: 31
End: 2021-05-05

## 2021-05-05 ENCOUNTER — TELEPHONE (OUTPATIENT)
Dept: SLEEP MEDICINE | Facility: CLINIC | Age: 31
End: 2021-05-05

## 2021-05-05 ENCOUNTER — OFFICE VISIT (OUTPATIENT)
Dept: SLEEP MEDICINE | Facility: CLINIC | Age: 31
End: 2021-05-05
Payer: COMMERCIAL

## 2021-05-05 DIAGNOSIS — G47.33 OBSTRUCTIVE SLEEP APNEA: Primary | ICD-10-CM

## 2021-05-05 DIAGNOSIS — F41.1 ANXIETY STATE: ICD-10-CM

## 2021-05-05 DIAGNOSIS — G47.10 HYPERSOMNIA WITH SLEEP APNEA: ICD-10-CM

## 2021-05-05 DIAGNOSIS — G47.30 HYPERSOMNIA WITH SLEEP APNEA: ICD-10-CM

## 2021-05-05 PROCEDURE — 99214 OFFICE O/P EST MOD 30 MIN: CPT | Mod: 95,,, | Performed by: NURSE PRACTITIONER

## 2021-05-05 PROCEDURE — 99214 PR OFFICE/OUTPT VISIT, EST, LEVL IV, 30-39 MIN: ICD-10-PCS | Mod: 95,,, | Performed by: NURSE PRACTITIONER

## 2021-05-05 RX ORDER — MODAFINIL 200 MG/1
200 TABLET ORAL DAILY PRN
Qty: 30 TABLET | Refills: 5 | Status: SHIPPED | OUTPATIENT
Start: 2021-05-05 | End: 2021-06-04

## 2021-05-18 DIAGNOSIS — G43.009 MIGRAINE WITHOUT AURA AND WITHOUT STATUS MIGRAINOSUS, NOT INTRACTABLE: ICD-10-CM

## 2021-05-18 RX ORDER — GABAPENTIN 300 MG/1
300 CAPSULE ORAL NIGHTLY
Qty: 30 CAPSULE | Refills: 1 | Status: SHIPPED | OUTPATIENT
Start: 2021-05-18 | End: 2022-04-18 | Stop reason: CLARIF

## 2021-05-25 ENCOUNTER — OFFICE VISIT (OUTPATIENT)
Dept: OTOLARYNGOLOGY | Facility: CLINIC | Age: 31
End: 2021-05-25
Payer: COMMERCIAL

## 2021-05-25 VITALS
HEIGHT: 69 IN | HEART RATE: 70 BPM | BODY MASS INDEX: 27.4 KG/M2 | WEIGHT: 185 LBS | DIASTOLIC BLOOD PRESSURE: 74 MMHG | SYSTOLIC BLOOD PRESSURE: 123 MMHG

## 2021-05-25 DIAGNOSIS — J34.2 NASAL SEPTAL DEVIATION: ICD-10-CM

## 2021-05-25 DIAGNOSIS — M95.0 NASAL DEFORMITY, ACQUIRED: Primary | ICD-10-CM

## 2021-05-25 DIAGNOSIS — Z98.890 POST-OPERATIVE STATE: ICD-10-CM

## 2021-05-25 PROCEDURE — 99024 PR POST-OP FOLLOW-UP VISIT: ICD-10-PCS | Mod: S$GLB,,, | Performed by: OTOLARYNGOLOGY

## 2021-05-25 PROCEDURE — 99024 POSTOP FOLLOW-UP VISIT: CPT | Mod: S$GLB,,, | Performed by: OTOLARYNGOLOGY

## 2021-05-26 ENCOUNTER — CLINICAL SUPPORT (OUTPATIENT)
Dept: AUDIOLOGY | Facility: CLINIC | Age: 31
End: 2021-05-26
Payer: COMMERCIAL

## 2021-05-26 ENCOUNTER — OFFICE VISIT (OUTPATIENT)
Dept: OTOLARYNGOLOGY | Facility: CLINIC | Age: 31
End: 2021-05-26
Payer: COMMERCIAL

## 2021-05-26 VITALS
TEMPERATURE: 98 F | HEIGHT: 69 IN | SYSTOLIC BLOOD PRESSURE: 122 MMHG | DIASTOLIC BLOOD PRESSURE: 78 MMHG | WEIGHT: 184.94 LBS | BODY MASS INDEX: 27.39 KG/M2

## 2021-05-26 DIAGNOSIS — H93.293 ABNORMAL AUDITORY PERCEPTION, BILATERAL: Primary | ICD-10-CM

## 2021-05-26 DIAGNOSIS — H93.13 TINNITUS OF BOTH EARS: ICD-10-CM

## 2021-05-26 DIAGNOSIS — H91.90 PERCEIVED HEARING LOSS: ICD-10-CM

## 2021-05-26 DIAGNOSIS — Z98.890 HISTORY OF SINUS SURGERY: ICD-10-CM

## 2021-05-26 DIAGNOSIS — Z01.10 NORMAL HEARING EXAM: Primary | ICD-10-CM

## 2021-05-26 PROCEDURE — 99213 PR OFFICE/OUTPT VISIT, EST, LEVL III, 20-29 MIN: ICD-10-PCS | Mod: S$GLB,,, | Performed by: NURSE PRACTITIONER

## 2021-05-26 PROCEDURE — 92550 TYMPANOMETRY & REFLEX THRESH: CPT | Mod: S$GLB,,, | Performed by: AUDIOLOGIST

## 2021-05-26 PROCEDURE — 92557 PR COMPREHENSIVE HEARING TEST: ICD-10-PCS | Mod: S$GLB,,, | Performed by: AUDIOLOGIST

## 2021-05-26 PROCEDURE — 92557 COMPREHENSIVE HEARING TEST: CPT | Mod: S$GLB,,, | Performed by: AUDIOLOGIST

## 2021-05-26 PROCEDURE — 92550 PR TYMPANOMETRY AND REFLEX THRESHOLD MEASUREMENTS: ICD-10-PCS | Mod: S$GLB,,, | Performed by: AUDIOLOGIST

## 2021-05-26 PROCEDURE — 99213 OFFICE O/P EST LOW 20 MIN: CPT | Mod: S$GLB,,, | Performed by: NURSE PRACTITIONER

## 2021-06-14 ENCOUNTER — OFFICE VISIT (OUTPATIENT)
Dept: PRIMARY CARE CLINIC | Facility: CLINIC | Age: 31
End: 2021-06-14
Payer: COMMERCIAL

## 2021-06-14 VITALS
HEART RATE: 64 BPM | HEIGHT: 69 IN | DIASTOLIC BLOOD PRESSURE: 76 MMHG | TEMPERATURE: 98 F | BODY MASS INDEX: 27.33 KG/M2 | OXYGEN SATURATION: 98 % | WEIGHT: 184.5 LBS | SYSTOLIC BLOOD PRESSURE: 120 MMHG

## 2021-06-14 DIAGNOSIS — B96.89 ACUTE BACTERIAL SINUSITIS: Primary | ICD-10-CM

## 2021-06-14 DIAGNOSIS — J01.90 ACUTE BACTERIAL SINUSITIS: Primary | ICD-10-CM

## 2021-06-14 PROCEDURE — 99213 OFFICE O/P EST LOW 20 MIN: CPT | Mod: S$GLB,,, | Performed by: FAMILY MEDICINE

## 2021-06-14 PROCEDURE — 99213 PR OFFICE/OUTPT VISIT, EST, LEVL III, 20-29 MIN: ICD-10-PCS | Mod: S$GLB,,, | Performed by: FAMILY MEDICINE

## 2021-06-14 PROCEDURE — 99999 PR PBB SHADOW E&M-EST. PATIENT-LVL IV: CPT | Mod: PBBFAC,,, | Performed by: FAMILY MEDICINE

## 2021-06-14 PROCEDURE — 99999 PR PBB SHADOW E&M-EST. PATIENT-LVL IV: ICD-10-PCS | Mod: PBBFAC,,, | Performed by: FAMILY MEDICINE

## 2021-06-14 RX ORDER — ALBUTEROL SULFATE 90 UG/1
AEROSOL, METERED RESPIRATORY (INHALATION)
COMMUNITY
Start: 2021-04-16

## 2021-06-14 RX ORDER — AMOXICILLIN AND CLAVULANATE POTASSIUM 875; 125 MG/1; MG/1
1 TABLET, FILM COATED ORAL EVERY 12 HOURS
Qty: 20 TABLET | Refills: 0 | Status: SHIPPED | OUTPATIENT
Start: 2021-06-14 | End: 2022-04-18 | Stop reason: CLARIF

## 2021-06-14 RX ORDER — EPINEPHRINE 0.3 MG/.3ML
INJECTION SUBCUTANEOUS
COMMUNITY
Start: 2021-04-14 | End: 2022-08-30 | Stop reason: SDUPTHER

## 2021-07-02 ENCOUNTER — HOSPITAL ENCOUNTER (OUTPATIENT)
Dept: RADIOLOGY | Facility: OTHER | Age: 31
Discharge: HOME | End: 2021-07-02
Attending: FAMILY MEDICINE
Payer: COMMERCIAL

## 2021-07-02 ENCOUNTER — OFFICE VISIT (OUTPATIENT)
Dept: FAMILY MEDICINE | Facility: CLINIC | Age: 31
End: 2021-07-02
Payer: COMMERCIAL

## 2021-07-02 VITALS — DIASTOLIC BLOOD PRESSURE: 76 MMHG | SYSTOLIC BLOOD PRESSURE: 120 MMHG

## 2021-07-02 DIAGNOSIS — M54.2 NECK PAIN: Primary | ICD-10-CM

## 2021-07-02 DIAGNOSIS — M54.2 NECK PAIN: ICD-10-CM

## 2021-07-02 PROCEDURE — 99214 OFFICE O/P EST MOD 30 MIN: CPT | Mod: 95,,, | Performed by: FAMILY MEDICINE

## 2021-07-02 PROCEDURE — 72040 X-RAY EXAM NECK SPINE 2-3 VW: CPT | Mod: 26,,, | Performed by: INTERNAL MEDICINE

## 2021-07-02 PROCEDURE — 72040 X-RAY EXAM NECK SPINE 2-3 VW: CPT | Mod: TC,FY

## 2021-07-02 PROCEDURE — 72040 XR CERVICAL SPINE AP LATERAL: ICD-10-PCS | Mod: 26,,, | Performed by: INTERNAL MEDICINE

## 2021-07-02 PROCEDURE — 99214 PR OFFICE/OUTPT VISIT, EST, LEVL IV, 30-39 MIN: ICD-10-PCS | Mod: 95,,, | Performed by: FAMILY MEDICINE

## 2021-07-02 RX ORDER — NAPROXEN 500 MG/1
500 TABLET ORAL 2 TIMES DAILY WITH MEALS
Qty: 60 TABLET | Refills: 2 | Status: SHIPPED | OUTPATIENT
Start: 2021-07-02 | End: 2021-09-30

## 2021-07-07 ENCOUNTER — OFFICE VISIT (OUTPATIENT)
Dept: OTOLARYNGOLOGY | Facility: CLINIC | Age: 31
End: 2021-07-07
Payer: COMMERCIAL

## 2021-07-07 VITALS
TEMPERATURE: 98 F | DIASTOLIC BLOOD PRESSURE: 69 MMHG | HEART RATE: 80 BPM | SYSTOLIC BLOOD PRESSURE: 119 MMHG | WEIGHT: 192.13 LBS | BODY MASS INDEX: 28.37 KG/M2

## 2021-07-07 DIAGNOSIS — Z98.890 POST-OPERATIVE STATE: ICD-10-CM

## 2021-07-07 DIAGNOSIS — M95.0 NASAL DEFORMITY, ACQUIRED: Primary | ICD-10-CM

## 2021-07-07 DIAGNOSIS — J34.2 NASAL SEPTAL DEVIATION: ICD-10-CM

## 2021-07-07 PROCEDURE — 99024 POSTOP FOLLOW-UP VISIT: CPT | Mod: S$GLB,,, | Performed by: OTOLARYNGOLOGY

## 2021-07-07 PROCEDURE — 99024 PR POST-OP FOLLOW-UP VISIT: ICD-10-PCS | Mod: S$GLB,,, | Performed by: OTOLARYNGOLOGY

## 2021-10-22 ENCOUNTER — IMMUNIZATION (OUTPATIENT)
Dept: INTERNAL MEDICINE | Facility: CLINIC | Age: 31
End: 2021-10-22
Payer: COMMERCIAL

## 2021-10-22 DIAGNOSIS — Z23 NEED FOR VACCINATION: Primary | ICD-10-CM

## 2021-10-22 PROCEDURE — 0003A COVID-19, MRNA, LNP-S, PF, 30 MCG/0.3 ML DOSE VACCINE: CPT | Mod: CV19,PBBFAC | Performed by: INTERNAL MEDICINE

## 2021-10-22 PROCEDURE — 91300 COVID-19, MRNA, LNP-S, PF, 30 MCG/0.3 ML DOSE VACCINE: CPT | Mod: PBBFAC | Performed by: INTERNAL MEDICINE

## 2021-12-14 ENCOUNTER — OFFICE VISIT (OUTPATIENT)
Dept: OTOLARYNGOLOGY | Facility: CLINIC | Age: 31
End: 2021-12-14
Payer: COMMERCIAL

## 2021-12-14 VITALS — SYSTOLIC BLOOD PRESSURE: 116 MMHG | TEMPERATURE: 98 F | HEART RATE: 68 BPM | DIASTOLIC BLOOD PRESSURE: 82 MMHG

## 2021-12-14 DIAGNOSIS — J34.2 NASAL SEPTAL DEVIATION: ICD-10-CM

## 2021-12-14 DIAGNOSIS — R04.0 EPISTAXIS: ICD-10-CM

## 2021-12-14 DIAGNOSIS — M95.0 NASAL DEFORMITY, ACQUIRED: Primary | ICD-10-CM

## 2021-12-14 PROCEDURE — 30901 PR CTRL 2SEBLEED,ANTER,SIMPLE: ICD-10-PCS | Mod: RT,S$GLB,, | Performed by: OTOLARYNGOLOGY

## 2021-12-14 PROCEDURE — 99024 POSTOP FOLLOW-UP VISIT: CPT | Mod: S$GLB,,, | Performed by: OTOLARYNGOLOGY

## 2021-12-14 PROCEDURE — 99024 PR POST-OP FOLLOW-UP VISIT: ICD-10-PCS | Mod: S$GLB,,, | Performed by: OTOLARYNGOLOGY

## 2021-12-14 PROCEDURE — 30901 CONTROL OF NOSEBLEED: CPT | Mod: RT,S$GLB,, | Performed by: OTOLARYNGOLOGY

## 2022-01-12 ENCOUNTER — TELEPHONE (OUTPATIENT)
Dept: OTOLARYNGOLOGY | Facility: CLINIC | Age: 32
End: 2022-01-12
Payer: COMMERCIAL

## 2022-01-12 NOTE — TELEPHONE ENCOUNTER
----- Message from Yoselin Herzog sent at 1/12/2022  9:55 AM CST -----  Regarding: Patient call back  Who called:SIDNEY SORENSEN [[id number]]    What is the request in detail: Patient is requesting a call back. Patient states he previously spoke with the staff regarding scheduling a procedure. He would like to discuss r/s that procedure.   Please advise.    Can the clinic reply by [facility]? No    Best call back number: [phone]    Additional Information: N/A

## 2022-01-14 DIAGNOSIS — Z01.818 PRE-OP TESTING: Primary | ICD-10-CM

## 2022-01-14 DIAGNOSIS — J34.2 NASAL SEPTAL DEVIATION: ICD-10-CM

## 2022-01-14 DIAGNOSIS — G47.33 OSA (OBSTRUCTIVE SLEEP APNEA): ICD-10-CM

## 2022-01-14 DIAGNOSIS — J34.89 NASAL OBSTRUCTION: ICD-10-CM

## 2022-01-14 DIAGNOSIS — M95.0 NASAL DEFORMITY, ACQUIRED: Primary | ICD-10-CM

## 2022-01-14 DIAGNOSIS — J34.3 NASAL TURBINATE HYPERTROPHY: ICD-10-CM

## 2022-01-14 DIAGNOSIS — Z98.890 HISTORY OF SINUS SURGERY: ICD-10-CM

## 2022-01-18 ENCOUNTER — TELEPHONE (OUTPATIENT)
Dept: OTOLARYNGOLOGY | Facility: CLINIC | Age: 32
End: 2022-01-18
Payer: COMMERCIAL

## 2022-01-18 ENCOUNTER — PATIENT MESSAGE (OUTPATIENT)
Dept: OTOLARYNGOLOGY | Facility: CLINIC | Age: 32
End: 2022-01-18
Payer: COMMERCIAL

## 2022-02-17 ENCOUNTER — PATIENT MESSAGE (OUTPATIENT)
Dept: SURGERY | Facility: OTHER | Age: 32
End: 2022-02-17
Payer: COMMERCIAL

## 2022-02-17 ENCOUNTER — TELEPHONE (OUTPATIENT)
Dept: OTOLARYNGOLOGY | Facility: CLINIC | Age: 32
End: 2022-02-17
Payer: COMMERCIAL

## 2022-02-17 ENCOUNTER — OFFICE VISIT (OUTPATIENT)
Dept: URGENT CARE | Facility: CLINIC | Age: 32
End: 2022-02-17
Payer: COMMERCIAL

## 2022-02-17 VITALS
DIASTOLIC BLOOD PRESSURE: 76 MMHG | WEIGHT: 203 LBS | HEART RATE: 84 BPM | TEMPERATURE: 99 F | RESPIRATION RATE: 17 BRPM | HEIGHT: 69 IN | SYSTOLIC BLOOD PRESSURE: 116 MMHG | BODY MASS INDEX: 30.07 KG/M2 | OXYGEN SATURATION: 98 %

## 2022-02-17 DIAGNOSIS — J06.9 VIRAL URI WITH COUGH: Primary | ICD-10-CM

## 2022-02-17 DIAGNOSIS — R05.9 COUGH: ICD-10-CM

## 2022-02-17 DIAGNOSIS — Z86.16 HISTORY OF COVID-19: ICD-10-CM

## 2022-02-17 LAB
CTP QC/QA: YES
SARS-COV-2 RDRP RESP QL NAA+PROBE: NEGATIVE

## 2022-02-17 PROCEDURE — 71046 XR CHEST PA AND LATERAL: ICD-10-PCS | Mod: S$GLB,,, | Performed by: RADIOLOGY

## 2022-02-17 PROCEDURE — 1159F MED LIST DOCD IN RCRD: CPT | Mod: CPTII,S$GLB,, | Performed by: NURSE PRACTITIONER

## 2022-02-17 PROCEDURE — 3074F PR MOST RECENT SYSTOLIC BLOOD PRESSURE < 130 MM HG: ICD-10-PCS | Mod: CPTII,S$GLB,, | Performed by: NURSE PRACTITIONER

## 2022-02-17 PROCEDURE — 3074F SYST BP LT 130 MM HG: CPT | Mod: CPTII,S$GLB,, | Performed by: NURSE PRACTITIONER

## 2022-02-17 PROCEDURE — 99203 OFFICE O/P NEW LOW 30 MIN: CPT | Mod: S$GLB,,, | Performed by: NURSE PRACTITIONER

## 2022-02-17 PROCEDURE — 71046 X-RAY EXAM CHEST 2 VIEWS: CPT | Mod: S$GLB,,, | Performed by: RADIOLOGY

## 2022-02-17 PROCEDURE — 3078F PR MOST RECENT DIASTOLIC BLOOD PRESSURE < 80 MM HG: ICD-10-PCS | Mod: CPTII,S$GLB,, | Performed by: NURSE PRACTITIONER

## 2022-02-17 PROCEDURE — U0002 COVID-19 LAB TEST NON-CDC: HCPCS | Mod: QW,S$GLB,, | Performed by: NURSE PRACTITIONER

## 2022-02-17 PROCEDURE — 99203 PR OFFICE/OUTPT VISIT, NEW, LEVL III, 30-44 MIN: ICD-10-PCS | Mod: S$GLB,,, | Performed by: NURSE PRACTITIONER

## 2022-02-17 PROCEDURE — 1160F PR REVIEW ALL MEDS BY PRESCRIBER/CLIN PHARMACIST DOCUMENTED: ICD-10-PCS | Mod: CPTII,S$GLB,, | Performed by: NURSE PRACTITIONER

## 2022-02-17 PROCEDURE — 3008F PR BODY MASS INDEX (BMI) DOCUMENTED: ICD-10-PCS | Mod: CPTII,S$GLB,, | Performed by: NURSE PRACTITIONER

## 2022-02-17 PROCEDURE — 1160F RVW MEDS BY RX/DR IN RCRD: CPT | Mod: CPTII,S$GLB,, | Performed by: NURSE PRACTITIONER

## 2022-02-17 PROCEDURE — U0002: ICD-10-PCS | Mod: QW,S$GLB,, | Performed by: NURSE PRACTITIONER

## 2022-02-17 PROCEDURE — 3078F DIAST BP <80 MM HG: CPT | Mod: CPTII,S$GLB,, | Performed by: NURSE PRACTITIONER

## 2022-02-17 PROCEDURE — 1159F PR MEDICATION LIST DOCUMENTED IN MEDICAL RECORD: ICD-10-PCS | Mod: CPTII,S$GLB,, | Performed by: NURSE PRACTITIONER

## 2022-02-17 PROCEDURE — 3008F BODY MASS INDEX DOCD: CPT | Mod: CPTII,S$GLB,, | Performed by: NURSE PRACTITIONER

## 2022-02-17 NOTE — PROGRESS NOTES
"Subjective:       Patient ID: Larry Madden is a 31 y.o. male.    Vitals:  height is 5' 9.02" (1.753 m) and weight is 92.1 kg (203 lb). His oral temperature is 98.6 °F (37 °C). His blood pressure is 116/76 and his pulse is 84. His respiration is 17 and oxygen saturation is 98%.     Chief Complaint: Cough    Patient complains of headaches, wheezing, cough and feels tightness on his chest due to the cough, symptoms started two to three weeks ago when he had covid 19.  Noted that he had a rapid covid 19 test today as a protocol, but then he clarified that he already had a positive covid 19 test 2-3 weeks ago at home.  Denies fever.  Hx of asthma and has an inhaler at home but has not used this.    Cough  This is a new problem. The current episode started 1 to 4 weeks ago. The problem has been gradually worsening. The cough is productive of sputum. Associated symptoms include headaches and wheezing. Pertinent negatives include no chest pain, chills, ear congestion, ear pain, fever, heartburn, hemoptysis, myalgias, nasal congestion, postnasal drip, rash, rhinorrhea, sore throat, shortness of breath, sweats or weight loss. Nothing aggravates the symptoms. He has tried nothing for the symptoms. His past medical history is significant for asthma. There is no history of bronchiectasis, bronchitis, COPD, emphysema, environmental allergies or pneumonia.       Constitution: Negative for chills, sweating, fatigue and fever.   HENT: Negative for ear pain, postnasal drip and sore throat.    Neck: Negative for neck pain and neck stiffness.   Cardiovascular: Negative for chest pain, leg swelling, palpitations and sob on exertion.   Respiratory: Positive for chest tightness, cough, wheezing and asthma. Negative for bloody sputum and shortness of breath.    Gastrointestinal: Negative for heartburn.   Musculoskeletal: Negative for muscle ache.   Skin: Negative for rash.   Allergic/Immunologic: Positive for asthma. Negative for " environmental allergies.   Neurological: Positive for headaches.       Objective:      Physical Exam   Constitutional: He is oriented to person, place, and time. He appears well-developed and well-nourished. He is cooperative.  Non-toxic appearance. He does not have a sickly appearance. He does not appear ill. No distress.   HENT:   Head: Normocephalic and atraumatic.   Ears:   Right Ear: Hearing, tympanic membrane, external ear and ear canal normal.   Left Ear: Hearing, tympanic membrane, external ear and ear canal normal.   Nose: Nose normal. No mucosal edema, rhinorrhea or nasal deformity. No epistaxis. Right sinus exhibits no maxillary sinus tenderness and no frontal sinus tenderness. Left sinus exhibits no maxillary sinus tenderness and no frontal sinus tenderness.   Mouth/Throat: Uvula is midline, oropharynx is clear and moist and mucous membranes are normal. No trismus in the jaw. Normal dentition. No uvula swelling. No oropharyngeal exudate, posterior oropharyngeal edema or posterior oropharyngeal erythema.   Eyes: Conjunctivae and lids are normal. No scleral icterus.   Neck: Trachea normal and phonation normal. Neck supple. No edema present. No erythema present. No neck rigidity present.   Cardiovascular: Normal rate, regular rhythm, normal heart sounds, intact distal pulses and normal pulses.   Pulmonary/Chest: Effort normal and breath sounds normal. No respiratory distress. He has no decreased breath sounds. He has no wheezes. He has no rhonchi.   Speaking in full and clear sentences with room air pulse ox of 98%    Comments: Speaking in full and clear sentences with room air pulse ox of 98%    Abdominal: Normal appearance.   Musculoskeletal: Normal range of motion.         General: No deformity or edema. Normal range of motion.   Neurological: He is alert and oriented to person, place, and time. He exhibits normal muscle tone. Coordination normal.   Skin: Skin is warm, dry, intact, not diaphoretic and  not pale.   Psychiatric: He has a normal mood and affect. His speech is normal and behavior is normal. Judgment and thought content normal. Cognition and memory  Nursing note and vitals reviewed.    Results for orders placed or performed in visit on 02/17/22   POCT COVID-19 Rapid Screening   Result Value Ref Range    POC Rapid COVID Negative Negative     Acceptable Yes      X-Ray Chest PA And Lateral    Result Date: 2/17/2022  EXAMINATION: XR CHEST PA AND LATERAL CLINICAL HISTORY: Cough, unspecified FINDINGS: Chest two views: Heart size is normal.  Lungs are clear.  The bones show nothing unusual.     No acute process seen. Electronically signed by: Dayton Pinon MD Date:    02/17/2022 Time:    15:40      Assessment:       1. Viral URI with cough    2. Cough    3. History of COVID-19          Plan:       Lab reviewed.  CXR reviewed.  Viral URI with cough    Cough  -     POCT COVID-19 Rapid Screening  -     X-Ray Chest PA And Lateral; Future; Expected date: 02/17/2022    History of COVID-19  -     X-Ray Chest PA And Lateral; Future; Expected date: 02/17/2022      Patient Instructions   Please drink plenty of fluids.  Please get plenty of rest.  Please return here or go to the Emergency Department for any concerns or worsening of condition.  If you do not have Hypertension or any history of palpitations, it is ok to take over the counter Sudafed or Mucinex D or Allegra-D or Claritin-D or Zyrtec-D.  If you do take one of the above, it is ok to combine that with plain over the counter Mucinex or Allegra or Claritin or Zyrtec.  If for example you are taking Zyrtec -D, you can combine that with Mucinex, but not Mucinex-D.  If you are taking Mucinex-D, you can combine that with plain Allegra or Claritin or Zyrtec.   If you do have Hypertension or palpitations, it is safe to take Coricidin HBP for relief of sinus symptoms.  If not allergic, please take over the counter Tylenol (Acetaminophen) and/or Motrin  (Ibuprofen) as directed for control of pain and/or fever.  Please follow up with your primary care doctor or specialist as needed.    If you  smoke, please stop smoking.  Patient Education       Viral Upper Respiratory Infection Discharge Instructions, Adult   About this topic   You have an upper respiratory infection or URI. A URI can affect your nose, throat, ears, and sinuses. A virus is the cause of almost all URIs and antibiotics will not help you feel better more quickly. The common cold is an example of a viral URI.  URIs are easy to spread from person to person, most often through coughing or sneezing. A URI will almost always get better in a week or two without any treatment.         What care is needed at home?   · Ask your doctor what you need to do when you go home. Make sure you ask questions if you do not understand what the doctor says.  · If you smoke, try to quit. Your doctor or nurse can help.  · Drink lots of fluids like water, juice, or broth. This will help replace any fluids lost if you have a runny nose or fever. Warm tea or soup can help soothe a sore throat.  · If the air in your home feels dry, use a cool mist humidifier. This can help a stuffy nose and make it easier to breathe.  · You can also use saline nose drops to relieve stuffiness.  · If you decide to take over-the-counter cough or cold medicines, follow the directions on the label carefully. Be sure you do not take more than 1 medicine that contains acetaminophen. Also, if you have a heart problem or high blood pressure, check with your doctor before you take any of these medicines.  · Wash your hands often. Cough or sneeze into a tissue or your elbow instead of your hands. This will help keep others healthy.  What follow-up care is needed?   Your doctor may ask you to make visits to the office to check on your progress. Be sure to keep these visits.  What drugs may be needed?   The doctor may order drugs to:  · Open up the tubes  of your lungs  · Treat viral infection  · Relieve or stop coughing  · Help with pain from a sore throat  · Relieve runny and stuffy nose  · Provide oxygen  Will physical activity be limited?   You need to rest for a few days to let your body recover from the infection.  What changes to diet are needed?   Eat soft foods like soup if swallowing is too painful.  What problems could happen?   · Asthma attack  · Sinus infections  · Lung problems like pneumonia and bronchitis  · Severe fluid loss. This is dehydration.  What can be done to prevent this health problem?   · Wash your hands often with soap and water for at least 20 seconds, especially after coughing or sneezing. Alcohol-based hand sanitizers also work to kill the virus.  · If you are sick, cover your mouth and nose with tissue when you cough or sneeze. You can also cough into your elbow. Throw away tissues in the trash and wash your hands after touching used tissues.  · Do not get too close (kissing, hugging) to people who are sick.  · Do not share towels or hankies with anyone who is sick. Clean commonly handled things like door handles, remotes, toys, and phones. Wipe them with a disinfectant.  · Stay away from crowded places.  · Cover your nose and mouth when you sneeze or cough.  · Take vitamin C to help build up your body's ability to fight disease.  · Get a flu shot each year.  When do I need to call the doctor?   · You have trouble breathing when talking or sitting still.  · You have a fever of 100.4°F (38°C) or higher for several days, chills, a very bad sore throat, or ear or sinus pain.  · You develop a new fever after several days of feeling the same or improving.  · You develop chest pain when you cough.  · You have a cough that lasts more than 10 days.  · You cough up blood, or the color of the mucus you cough up changes.  Teach Back: Helping You Understand   The Teach Back Method helps you understand the information we are giving you. After you  talk with the staff, tell them in your own words what you learned. This helps to make sure the staff has described each thing clearly. It also helps to explain things that may have been confusing. Before going home, make sure you can do these:  · I can tell you about my condition.  · I can tell you what may help ease my signs.  · I can tell you what I will do if I have a fever, chills, breathing very fast, or trouble breathing.  Where can I learn more?   American Lung Association  https://www.lung.org/blog/can-you-exercise-with-a-cold   American Lung Association  https://www.lung.org/lung-health-diseases/lung-disease-lookup/influenza/facts-about-the-common-cold   NHS Choices  https://www.nhs.uk/conditions/respiratory-tract-infection/   UpToDate  https://www.[employer]/contents/the-common-cold-in-adults-beyond-the-basics   Last Reviewed Date   2021-06-08  Consumer Information Use and Disclaimer   This information is not specific medical advice and does not replace information you receive from your health care provider. This is only a brief summary of general information. It does NOT include all information about conditions, illnesses, injuries, tests, procedures, treatments, therapies, discharge instructions or life-style choices that may apply to you. You must talk with your health care provider for complete information about your health and treatment options. This information should not be used to decide whether or not to accept your health care providers advice, instructions or recommendations. Only your health care provider has the knowledge and training to provide advice that is right for you.  Copyright   Copyright © 2021 UpToDate, Inc. and its affiliates and/or licensors. All rights reserved.

## 2022-02-17 NOTE — PATIENT INSTRUCTIONS
Please drink plenty of fluids.  Please get plenty of rest.  Please return here or go to the Emergency Department for any concerns or worsening of condition.  If you do not have Hypertension or any history of palpitations, it is ok to take over the counter Sudafed or Mucinex D or Allegra-D or Claritin-D or Zyrtec-D.  If you do take one of the above, it is ok to combine that with plain over the counter Mucinex or Allegra or Claritin or Zyrtec.  If for example you are taking Zyrtec -D, you can combine that with Mucinex, but not Mucinex-D.  If you are taking Mucinex-D, you can combine that with plain Allegra or Claritin or Zyrtec.   If you do have Hypertension or palpitations, it is safe to take Coricidin HBP for relief of sinus symptoms.  If not allergic, please take over the counter Tylenol (Acetaminophen) and/or Motrin (Ibuprofen) as directed for control of pain and/or fever.  Please follow up with your primary care doctor or specialist as needed.    If you  smoke, please stop smoking.  Patient Education       Viral Upper Respiratory Infection Discharge Instructions, Adult   About this topic   You have an upper respiratory infection or URI. A URI can affect your nose, throat, ears, and sinuses. A virus is the cause of almost all URIs and antibiotics will not help you feel better more quickly. The common cold is an example of a viral URI.  URIs are easy to spread from person to person, most often through coughing or sneezing. A URI will almost always get better in a week or two without any treatment.         What care is needed at home?   · Ask your doctor what you need to do when you go home. Make sure you ask questions if you do not understand what the doctor says.  · If you smoke, try to quit. Your doctor or nurse can help.  · Drink lots of fluids like water, juice, or broth. This will help replace any fluids lost if you have a runny nose or fever. Warm tea or soup can help soothe a sore throat.  · If the air in  your home feels dry, use a cool mist humidifier. This can help a stuffy nose and make it easier to breathe.  · You can also use saline nose drops to relieve stuffiness.  · If you decide to take over-the-counter cough or cold medicines, follow the directions on the label carefully. Be sure you do not take more than 1 medicine that contains acetaminophen. Also, if you have a heart problem or high blood pressure, check with your doctor before you take any of these medicines.  · Wash your hands often. Cough or sneeze into a tissue or your elbow instead of your hands. This will help keep others healthy.  What follow-up care is needed?   Your doctor may ask you to make visits to the office to check on your progress. Be sure to keep these visits.  What drugs may be needed?   The doctor may order drugs to:  · Open up the tubes of your lungs  · Treat viral infection  · Relieve or stop coughing  · Help with pain from a sore throat  · Relieve runny and stuffy nose  · Provide oxygen  Will physical activity be limited?   You need to rest for a few days to let your body recover from the infection.  What changes to diet are needed?   Eat soft foods like soup if swallowing is too painful.  What problems could happen?   · Asthma attack  · Sinus infections  · Lung problems like pneumonia and bronchitis  · Severe fluid loss. This is dehydration.  What can be done to prevent this health problem?   · Wash your hands often with soap and water for at least 20 seconds, especially after coughing or sneezing. Alcohol-based hand sanitizers also work to kill the virus.  · If you are sick, cover your mouth and nose with tissue when you cough or sneeze. You can also cough into your elbow. Throw away tissues in the trash and wash your hands after touching used tissues.  · Do not get too close (kissing, hugging) to people who are sick.  · Do not share towels or hankies with anyone who is sick. Clean commonly handled things like door handles,  remotes, toys, and phones. Wipe them with a disinfectant.  · Stay away from crowded places.  · Cover your nose and mouth when you sneeze or cough.  · Take vitamin C to help build up your body's ability to fight disease.  · Get a flu shot each year.  When do I need to call the doctor?   · You have trouble breathing when talking or sitting still.  · You have a fever of 100.4°F (38°C) or higher for several days, chills, a very bad sore throat, or ear or sinus pain.  · You develop a new fever after several days of feeling the same or improving.  · You develop chest pain when you cough.  · You have a cough that lasts more than 10 days.  · You cough up blood, or the color of the mucus you cough up changes.  Teach Back: Helping You Understand   The Teach Back Method helps you understand the information we are giving you. After you talk with the staff, tell them in your own words what you learned. This helps to make sure the staff has described each thing clearly. It also helps to explain things that may have been confusing. Before going home, make sure you can do these:  · I can tell you about my condition.  · I can tell you what may help ease my signs.  · I can tell you what I will do if I have a fever, chills, breathing very fast, or trouble breathing.  Where can I learn more?   American Lung Association  https://www.lung.org/blog/can-you-exercise-with-a-cold   American Lung Association  https://www.lung.org/lung-health-diseases/lung-disease-lookup/influenza/facts-about-the-common-cold   NHS Choices  https://www.nhs.uk/conditions/respiratory-tract-infection/   UpToDate  https://www.uptodate.com/contents/the-common-cold-in-adults-beyond-the-basics   Last Reviewed Date   2021-06-08  Consumer Information Use and Disclaimer   This information is not specific medical advice and does not replace information you receive from your health care provider. This is only a brief summary of general information. It does NOT include all  information about conditions, illnesses, injuries, tests, procedures, treatments, therapies, discharge instructions or life-style choices that may apply to you. You must talk with your health care provider for complete information about your health and treatment options. This information should not be used to decide whether or not to accept your health care providers advice, instructions or recommendations. Only your health care provider has the knowledge and training to provide advice that is right for you.  Copyright   Copyright © 2021 [employer] and its affiliates and/or licensors. All rights reserved.

## 2022-02-23 ENCOUNTER — TELEPHONE (OUTPATIENT)
Dept: OTOLARYNGOLOGY | Facility: CLINIC | Age: 32
End: 2022-02-23
Payer: COMMERCIAL

## 2022-02-23 ENCOUNTER — PATIENT MESSAGE (OUTPATIENT)
Dept: PRIMARY CARE CLINIC | Facility: CLINIC | Age: 32
End: 2022-02-23
Payer: COMMERCIAL

## 2022-02-23 NOTE — TELEPHONE ENCOUNTER
Called to notify the patient of his appointment changes since he wanted to move his surgery to 4/7.  He then asked if I could change his surgery to 4/21.  I made the necessary changes and he does have access to the portal so that he can see the changes in his appointments.

## 2022-02-23 NOTE — TELEPHONE ENCOUNTER
Had spoken with patient on 2/17 and he states he was going to an urgent care since Dr. Orantes was not available to see him nor was there another provider in the clinic that could see him.  He states he tested positive for covid on 1/31 on a home test and was still having some residual issues.  The patient wanted to change his surgery date from 3/3 to 4/7.  I told him that I would make the changes and update his appointments on [facility].  Voiced understanding.

## 2022-04-18 ENCOUNTER — HOSPITAL ENCOUNTER (OUTPATIENT)
Dept: PREADMISSION TESTING | Facility: OTHER | Age: 32
Discharge: HOME | End: 2022-04-18
Attending: OTOLARYNGOLOGY
Payer: COMMERCIAL

## 2022-04-18 ENCOUNTER — ANESTHESIA EVENT (OUTPATIENT)
Dept: SURGERY | Facility: OTHER | Age: 32
End: 2022-04-18
Payer: COMMERCIAL

## 2022-04-18 VITALS
BODY MASS INDEX: 29.62 KG/M2 | SYSTOLIC BLOOD PRESSURE: 136 MMHG | OXYGEN SATURATION: 97 % | DIASTOLIC BLOOD PRESSURE: 83 MMHG | WEIGHT: 200 LBS | HEART RATE: 62 BPM | HEIGHT: 69 IN

## 2022-04-18 DIAGNOSIS — Z01.818 PRE-OP TESTING: ICD-10-CM

## 2022-04-18 LAB
ANION GAP SERPL CALC-SCNC: 12 MMOL/L (ref 8–16)
BASOPHILS # BLD AUTO: 0.04 K/UL (ref 0–0.2)
BASOPHILS NFR BLD: 0.8 % (ref 0–1.9)
BUN SERPL-MCNC: 14 MG/DL (ref 6–20)
CALCIUM SERPL-MCNC: 10 MG/DL (ref 8.7–10.5)
CHLORIDE SERPL-SCNC: 106 MMOL/L (ref 95–110)
CO2 SERPL-SCNC: 26 MMOL/L (ref 23–29)
CREAT SERPL-MCNC: 1 MG/DL (ref 0.5–1.4)
DIFFERENTIAL METHOD: ABNORMAL
EOSINOPHIL # BLD AUTO: 0.1 K/UL (ref 0–0.5)
EOSINOPHIL NFR BLD: 1.7 % (ref 0–8)
ERYTHROCYTE [DISTWIDTH] IN BLOOD BY AUTOMATED COUNT: 11.8 % (ref 11.5–14.5)
EST. GFR  (AFRICAN AMERICAN): >60 ML/MIN/1.73 M^2
EST. GFR  (NON AFRICAN AMERICAN): >60 ML/MIN/1.73 M^2
GLUCOSE SERPL-MCNC: 92 MG/DL (ref 70–110)
HCT VFR BLD AUTO: 45.8 % (ref 40–54)
HGB BLD-MCNC: 15.5 G/DL (ref 14–18)
IMM GRANULOCYTES # BLD AUTO: 0 K/UL (ref 0–0.04)
IMM GRANULOCYTES NFR BLD AUTO: 0 % (ref 0–0.5)
LYMPHOCYTES # BLD AUTO: 2 K/UL (ref 1–4.8)
LYMPHOCYTES NFR BLD: 38.8 % (ref 18–48)
MCH RBC QN AUTO: 32.2 PG (ref 27–31)
MCHC RBC AUTO-ENTMCNC: 33.8 G/DL (ref 32–36)
MCV RBC AUTO: 95 FL (ref 82–98)
MONOCYTES # BLD AUTO: 0.3 K/UL (ref 0.3–1)
MONOCYTES NFR BLD: 5.9 % (ref 4–15)
NEUTROPHILS # BLD AUTO: 2.8 K/UL (ref 1.8–7.7)
NEUTROPHILS NFR BLD: 52.8 % (ref 38–73)
NRBC BLD-RTO: 0 /100 WBC
PLATELET # BLD AUTO: 263 K/UL (ref 150–450)
PLATELET FUNCTION ASSAY - EPINEPHRINE: 127 SECS (ref 76–199)
PMV BLD AUTO: 11 FL (ref 9.2–12.9)
POTASSIUM SERPL-SCNC: 4.7 MMOL/L (ref 3.5–5.1)
RBC # BLD AUTO: 4.81 M/UL (ref 4.6–6.2)
SODIUM SERPL-SCNC: 144 MMOL/L (ref 136–145)
WBC # BLD AUTO: 5.26 K/UL (ref 3.9–12.7)

## 2022-04-18 PROCEDURE — 36415 COLL VENOUS BLD VENIPUNCTURE: CPT | Performed by: OTOLARYNGOLOGY

## 2022-04-18 PROCEDURE — 85025 COMPLETE CBC W/AUTO DIFF WBC: CPT | Performed by: OTOLARYNGOLOGY

## 2022-04-18 PROCEDURE — 80048 BASIC METABOLIC PNL TOTAL CA: CPT | Performed by: OTOLARYNGOLOGY

## 2022-04-18 PROCEDURE — 85576 BLOOD PLATELET AGGREGATION: CPT | Performed by: OTOLARYNGOLOGY

## 2022-04-18 RX ORDER — SODIUM CHLORIDE, SODIUM LACTATE, POTASSIUM CHLORIDE, CALCIUM CHLORIDE 600; 310; 30; 20 MG/100ML; MG/100ML; MG/100ML; MG/100ML
INJECTION, SOLUTION INTRAVENOUS CONTINUOUS
Status: CANCELLED | OUTPATIENT
Start: 2022-04-18

## 2022-04-18 RX ORDER — LIDOCAINE HYDROCHLORIDE 10 MG/ML
0.5 INJECTION, SOLUTION EPIDURAL; INFILTRATION; INTRACAUDAL; PERINEURAL ONCE
Status: CANCELLED | OUTPATIENT
Start: 2022-04-18 | End: 2022-04-18

## 2022-04-18 NOTE — DISCHARGE INSTRUCTIONS
Information to Prepare you for your Surgery    PRE-ADMIT TESTING -  [phone]    [address] [facility]          Your surgery has been scheduled at [facility]. We are pleased to have the opportunity to serve you. For Further Information please call [phone].    On the day of surgery please report to the Information Desk on the 1st floor.    CONTACT YOUR PHYSICIAN'S OFFICE THE DAY PRIOR TO YOUR SURGERY TO OBTAIN YOUR ARRIVAL TIME.     The evening before surgery do not eat anything after 9 p.m. ( this includes hard candy, chewing gum and mints).  You may only have GATORADE, POWERADE AND WATER  from 9 p.m. until you leave your home.   DO NOT DRINK ANY LIQUIDS ON THE WAY TO THE HOSPITAL.      Why does your anesthesiologist allow you to drink Gatorade/Powerade before surgery?  Gatorade/Powerade helps to increase your comfort before surgery and to decrease your nausea after surgery. The carbohydrates in Gatorade/Powerade help reduce your body's stress response to surgery.  If you are a diabetic-drink only water prior to surgery.      Current Visitor policy(12/27/2021) - Patients may have 2 visitors pre and post procedure. Only 2 visitors will be allowed in the Surgical building with the patient.     SPECIAL MEDICATION INSTRUCTIONS: TAKE medications checked off by the Anesthesiologist on your Medication List.    Angiogram Patients: Take medications as instructed by your physician, including aspirin.     Surgery Patients:    If you take ASPIRIN - Your PHYSICIAN/SURGEON will need to inform you IF/OR when you need to stop taking aspirin prior to your surgery.     Do Not take any medications containing IBUPROFEN.    Do Not Wear any make-up (especially eye make-up) to surgery. Please remove any false eyelashes or eyelash extensions. If you arrive the day of surgery with makeup/eyelashes on you will be required to remove prior to surgery. (There is a risk of corneal  abrasions if eye makeup/eyelash extensions are not removed)      Leave all valuables at home.   Do Not wear any jewelry or watches, including any metal in body piercings. Jewelry must be removed prior to coming to the hospital.  There is a possibility that rings that are unable to be removed may be cut off if they are on the surgical extremity.    Please remove all hair extensions, wigs, clips and any other metal accessories/ ornaments from your hair.  These items may pose a flammable/fire risk in Surgery and must be removed.    Do not shave your surgical area at least 5 days prior to your surgery. The surgical prep will be performed at the hospital according to Infection Control regulations.    Contact Lens must be removed before surgery. Either do not wear the contact lens or bring a case and solution for storage.  Please bring a container for eyeglasses or dentures as required.  Bring any paperwork your physician has provided, such as consent forms,  history and physicals, doctor's orders, etc.   Bring comfortable clothes that are loose fitting to wear upon discharge. Take into consideration the type of surgery being performed.  Maintain your diet as advised per your physician the day prior to surgery.      Adequate rest the night before surgery is advised.   Park in the Parking lot behind the hospital or in the [employer] [facility] across the street from the parking lot. Parking is complimentary.  If you will be discharged the same day as your procedure, please arrange for a responsible adult to drive you home or to accompany you if traveling by taxi.   YOU WILL NOT BE PERMITTED TO DRIVE OR TO LEAVE THE HOSPITAL ALONE AFTER SURGERY.   If you are being discharged the same day, it is strongly recommended that you arrange for someone to remain with you for the first 24 hrs following your surgery.    The Surgeon will speak to your family/visitor after your surgery regarding the outcome of your surgery and post op  care.  The Surgeon may speak to you after your surgery, but there is a possibility you may not remember the details.  Please check with your family members regarding the conversation with the Surgeon.    We strongly recommend whoever is bringing you home be present for discharge instructions.  This will ensure a thorough understanding for your post op home care.    ALL CHILDREN MUST ALWAYS BE ACCOMPANIED BY AN ADULT.    Visitors-Refer to current Visitor policy handouts.    Thank you for your cooperation.  The Staff of [facility].            Bathing Instructions with Hibiclens    Shower the evening before and morning of your procedure with Hibiclens:  Wash your face with water and your regular face wash/soap  Apply Hibiclens directly on your skin or on a wet washcloth and wash gently. When showering: Move away from the shower stream when applying Hibiclens to avoid rinsing off too soon.  Rinse thoroughly with warm water  Do not dilute Hibiclens        Dry off as usual, do not use any deodorant, powder, body lotions, perfume, after shave or cologne.

## 2022-04-18 NOTE — ANESTHESIA PREPROCEDURE EVALUATION
04/18/2022  Larry Madden is a 32 y.o., male.      Pre-op Assessment    I have reviewed the Patient Summary Reports.     I have reviewed the Nursing Notes. I have reviewed the NPO Status.   I have reviewed the Medications.     Review of Systems  Anesthesia Hx:  Denies Family Hx of Anesthesia complications.   Denies Personal Hx of Anesthesia complications.   Social:  Non-Smoker    Hematology/Oncology:  Hematology Normal   Oncology Normal     EENT/Dental:EENT/Dental Normal   Cardiovascular:  Cardiovascular Normal     Pulmonary:   Asthma Sleep Apnea, CPAP    Renal/:  Renal/ Normal     Hepatic/GI:  Hepatic/GI Normal    Musculoskeletal:  Musculoskeletal Normal    Neurological:   Headaches    Endocrine:  Endocrine Normal    Dermatological:  Skin Normal    Psych:   anxiety          Physical Exam  General: Cooperative, Alert and Oriented    Airway:  Mallampati: II       Dental:  Intact        Anesthesia Plan  Type of Anesthesia, risks & benefits discussed:    Anesthesia Type: Gen ETT  Intra-op Monitoring Plan: Standard ASA Monitors  Post Op Pain Control Plan: multimodal analgesia  Induction:  IV  Airway Plan: Video  Informed Consent: Informed consent signed with the Patient and all parties understand the risks and agree with anesthesia plan.  All questions answered.   ASA Score: 2  Anesthesia Plan Notes: Labs per surgeon    Ready For Surgery From Anesthesia Perspective.     .

## 2022-04-20 ENCOUNTER — TELEPHONE (OUTPATIENT)
Dept: OTOLARYNGOLOGY | Facility: CLINIC | Age: 32
End: 2022-04-20
Payer: COMMERCIAL

## 2022-04-20 NOTE — TELEPHONE ENCOUNTER
Nothing to eat after 9pm tonight including candy, gum, or mints but can have clear liquids including gatorade or powerade up to the time you leave your home.  Please be sure to review your post op instructions in the folder you received and also if there are any questions or concerns after your surgery to please remember to call the after hours number of [phone].  If you need to go to the ER please go to [facility] on [address]-there is a resident on call to address any ENT needs.   Please remember to stay on top of your pain and stay hydrated.

## 2022-04-21 ENCOUNTER — ANESTHESIA (OUTPATIENT)
Dept: SURGERY | Facility: OTHER | Age: 32
End: 2022-04-21
Payer: COMMERCIAL

## 2022-04-21 ENCOUNTER — HOSPITAL ENCOUNTER (OUTPATIENT)
Facility: OTHER | Age: 32
Discharge: HOME | End: 2022-04-21
Attending: OTOLARYNGOLOGY | Admitting: OTOLARYNGOLOGY
Payer: COMMERCIAL

## 2022-04-21 VITALS
DIASTOLIC BLOOD PRESSURE: 89 MMHG | HEART RATE: 95 BPM | WEIGHT: 200 LBS | RESPIRATION RATE: 15 BRPM | TEMPERATURE: 98 F | OXYGEN SATURATION: 96 % | SYSTOLIC BLOOD PRESSURE: 140 MMHG | BODY MASS INDEX: 29.53 KG/M2

## 2022-04-21 DIAGNOSIS — J34.89 NASAL OBSTRUCTION: Primary | ICD-10-CM

## 2022-04-21 DIAGNOSIS — J34.2 DEVIATED NASAL SEPTUM: ICD-10-CM

## 2022-04-21 PROCEDURE — 71000039 HC RECOVERY, EACH ADD'L HOUR: Performed by: OTOLARYNGOLOGY

## 2022-04-21 PROCEDURE — 71000016 HC POSTOP RECOV ADDL HR: Performed by: OTOLARYNGOLOGY

## 2022-04-21 PROCEDURE — 37000009 HC ANESTHESIA EA ADD 15 MINS: Performed by: OTOLARYNGOLOGY

## 2022-04-21 PROCEDURE — 20912 REMOVE CARTILAGE FOR GRAFT: CPT | Mod: 51,,, | Performed by: OTOLARYNGOLOGY

## 2022-04-21 PROCEDURE — 63600175 PHARM REV CODE 636 W HCPCS: Performed by: OTOLARYNGOLOGY

## 2022-04-21 PROCEDURE — 36000707: Performed by: OTOLARYNGOLOGY

## 2022-04-21 PROCEDURE — 30465 PR REPAIR NASAL CAVITY STENOSIS: ICD-10-PCS | Mod: ,,, | Performed by: OTOLARYNGOLOGY

## 2022-04-21 PROCEDURE — 30520 REPAIR OF NASAL SEPTUM: CPT | Mod: 51,,, | Performed by: OTOLARYNGOLOGY

## 2022-04-21 PROCEDURE — 20912 PR REMV CARTILAGE FOR GRAFT NASAL: ICD-10-PCS | Mod: 51,,, | Performed by: OTOLARYNGOLOGY

## 2022-04-21 PROCEDURE — 30140 PR EXCISION TURBINATE,SUBMUCOUS: ICD-10-PCS | Mod: 50,51,, | Performed by: OTOLARYNGOLOGY

## 2022-04-21 PROCEDURE — 37000008 HC ANESTHESIA 1ST 15 MINUTES: Performed by: OTOLARYNGOLOGY

## 2022-04-21 PROCEDURE — 30520 PR REPAIR, NASAL SEPTUM: ICD-10-PCS | Mod: 51,,, | Performed by: OTOLARYNGOLOGY

## 2022-04-21 PROCEDURE — 63600175 PHARM REV CODE 636 W HCPCS: Performed by: NURSE ANESTHETIST, CERTIFIED REGISTERED

## 2022-04-21 PROCEDURE — 30465 REPAIR NASAL STENOSIS: CPT | Mod: ,,, | Performed by: OTOLARYNGOLOGY

## 2022-04-21 PROCEDURE — 63600175 PHARM REV CODE 636 W HCPCS: Performed by: ANESTHESIOLOGY

## 2022-04-21 PROCEDURE — 25000003 PHARM REV CODE 250: Performed by: NURSE ANESTHETIST, CERTIFIED REGISTERED

## 2022-04-21 PROCEDURE — 27201423 OPTIME MED/SURG SUP & DEVICES STERILE SUPPLY: Performed by: OTOLARYNGOLOGY

## 2022-04-21 PROCEDURE — 25000003 PHARM REV CODE 250: Performed by: OTOLARYNGOLOGY

## 2022-04-21 PROCEDURE — 36000706: Performed by: OTOLARYNGOLOGY

## 2022-04-21 PROCEDURE — 71000015 HC POSTOP RECOV 1ST HR: Performed by: OTOLARYNGOLOGY

## 2022-04-21 PROCEDURE — 30140 RESECT INFERIOR TURBINATE: CPT | Mod: 50,51,, | Performed by: OTOLARYNGOLOGY

## 2022-04-21 PROCEDURE — 71000033 HC RECOVERY, INTIAL HOUR: Performed by: OTOLARYNGOLOGY

## 2022-04-21 RX ORDER — DEXMEDETOMIDINE HYDROCHLORIDE 100 UG/ML
INJECTION, SOLUTION INTRAVENOUS
Status: DISCONTINUED | OUTPATIENT
Start: 2022-04-21 | End: 2022-04-21

## 2022-04-21 RX ORDER — SODIUM CHLORIDE 9 MG/ML
INJECTION, SOLUTION INTRAVENOUS CONTINUOUS
Status: DISCONTINUED | OUTPATIENT
Start: 2022-04-21 | End: 2022-04-21 | Stop reason: HOSPADM

## 2022-04-21 RX ORDER — DEXAMETHASONE SODIUM PHOSPHATE 4 MG/ML
INJECTION, SOLUTION INTRA-ARTICULAR; INTRALESIONAL; INTRAMUSCULAR; INTRAVENOUS; SOFT TISSUE
Status: DISCONTINUED | OUTPATIENT
Start: 2022-04-21 | End: 2022-04-21

## 2022-04-21 RX ORDER — FENTANYL CITRATE 50 UG/ML
INJECTION, SOLUTION INTRAMUSCULAR; INTRAVENOUS
Status: DISCONTINUED | OUTPATIENT
Start: 2022-04-21 | End: 2022-04-21

## 2022-04-21 RX ORDER — HYDROMORPHONE HYDROCHLORIDE 2 MG/ML
0.4 INJECTION, SOLUTION INTRAMUSCULAR; INTRAVENOUS; SUBCUTANEOUS EVERY 5 MIN PRN
Status: DISCONTINUED | OUTPATIENT
Start: 2022-04-21 | End: 2022-04-21 | Stop reason: HOSPADM

## 2022-04-21 RX ORDER — LIDOCAINE HYDROCHLORIDE AND EPINEPHRINE 10; 10 MG/ML; UG/ML
INJECTION, SOLUTION INFILTRATION; PERINEURAL
Status: DISCONTINUED | OUTPATIENT
Start: 2022-04-21 | End: 2022-04-21 | Stop reason: HOSPADM

## 2022-04-21 RX ORDER — BUPIVACAINE HYDROCHLORIDE AND EPINEPHRINE 5; 5 MG/ML; UG/ML
INJECTION, SOLUTION EPIDURAL; INTRACAUDAL; PERINEURAL
Status: DISCONTINUED | OUTPATIENT
Start: 2022-04-21 | End: 2022-04-21 | Stop reason: HOSPADM

## 2022-04-21 RX ORDER — MIDAZOLAM HYDROCHLORIDE 1 MG/ML
INJECTION INTRAMUSCULAR; INTRAVENOUS
Status: DISCONTINUED | OUTPATIENT
Start: 2022-04-21 | End: 2022-04-21

## 2022-04-21 RX ORDER — ALBUTEROL SULFATE 2.5 MG/.5ML
2.5 SOLUTION RESPIRATORY (INHALATION)
Status: DISCONTINUED | OUTPATIENT
Start: 2022-04-21 | End: 2022-04-21

## 2022-04-21 RX ORDER — PROCHLORPERAZINE EDISYLATE 5 MG/ML
5 INJECTION INTRAMUSCULAR; INTRAVENOUS EVERY 30 MIN PRN
Status: DISCONTINUED | OUTPATIENT
Start: 2022-04-21 | End: 2022-04-21 | Stop reason: HOSPADM

## 2022-04-21 RX ORDER — CEPHALEXIN 500 MG/1
500 CAPSULE ORAL EVERY 6 HOURS
Qty: 28 CAPSULE | Refills: 0 | Status: SHIPPED | OUTPATIENT
Start: 2022-04-21 | End: 2022-04-28

## 2022-04-21 RX ORDER — PROPOFOL 10 MG/ML
VIAL (ML) INTRAVENOUS
Status: DISCONTINUED | OUTPATIENT
Start: 2022-04-21 | End: 2022-04-21

## 2022-04-21 RX ORDER — EPINEPHRINE 1 MG/ML
INJECTION, SOLUTION INTRACARDIAC; INTRAMUSCULAR; INTRAVENOUS; SUBCUTANEOUS
Status: DISCONTINUED | OUTPATIENT
Start: 2022-04-21 | End: 2022-04-21 | Stop reason: HOSPADM

## 2022-04-21 RX ORDER — ROCURONIUM BROMIDE 10 MG/ML
INJECTION, SOLUTION INTRAVENOUS
Status: DISCONTINUED | OUTPATIENT
Start: 2022-04-21 | End: 2022-04-21

## 2022-04-21 RX ORDER — HYDROCODONE BITARTRATE AND ACETAMINOPHEN 5; 325 MG/1; MG/1
1 TABLET ORAL EVERY 6 HOURS PRN
Qty: 15 TABLET | Refills: 0 | Status: SHIPPED | OUTPATIENT
Start: 2022-04-21 | End: 2022-06-25

## 2022-04-21 RX ORDER — SODIUM CHLORIDE, SODIUM LACTATE, POTASSIUM CHLORIDE, CALCIUM CHLORIDE 600; 310; 30; 20 MG/100ML; MG/100ML; MG/100ML; MG/100ML
INJECTION, SOLUTION INTRAVENOUS CONTINUOUS
Status: DISCONTINUED | OUTPATIENT
Start: 2022-04-21 | End: 2022-04-21 | Stop reason: HOSPADM

## 2022-04-21 RX ORDER — OXYCODONE HYDROCHLORIDE 5 MG/1
5 TABLET ORAL
Status: DISCONTINUED | OUTPATIENT
Start: 2022-04-21 | End: 2022-04-21 | Stop reason: HOSPADM

## 2022-04-21 RX ORDER — SODIUM CHLORIDE 0.9 % (FLUSH) 0.9 %
3 SYRINGE (ML) INJECTION
Status: DISCONTINUED | OUTPATIENT
Start: 2022-04-21 | End: 2022-04-21 | Stop reason: HOSPADM

## 2022-04-21 RX ORDER — PHENYLEPHRINE HYDROCHLORIDE 10 MG/ML
INJECTION INTRAVENOUS
Status: DISCONTINUED | OUTPATIENT
Start: 2022-04-21 | End: 2022-04-21

## 2022-04-21 RX ORDER — LIDOCAINE HYDROCHLORIDE 20 MG/ML
INJECTION INTRAVENOUS
Status: DISCONTINUED | OUTPATIENT
Start: 2022-04-21 | End: 2022-04-21

## 2022-04-21 RX ORDER — ONDANSETRON 4 MG/1
4 TABLET, ORALLY DISINTEGRATING ORAL EVERY 6 HOURS PRN
Qty: 15 TABLET | Refills: 0 | Status: SHIPPED | OUTPATIENT
Start: 2022-04-21 | End: 2022-06-25

## 2022-04-21 RX ORDER — ONDANSETRON 2 MG/ML
INJECTION INTRAMUSCULAR; INTRAVENOUS
Status: DISCONTINUED | OUTPATIENT
Start: 2022-04-21 | End: 2022-04-21

## 2022-04-21 RX ORDER — CEFAZOLIN SODIUM 1 G/3ML
INJECTION, POWDER, FOR SOLUTION INTRAMUSCULAR; INTRAVENOUS
Status: DISCONTINUED | OUTPATIENT
Start: 2022-04-21 | End: 2022-04-21

## 2022-04-21 RX ORDER — LIDOCAINE HYDROCHLORIDE 10 MG/ML
0.5 INJECTION, SOLUTION EPIDURAL; INFILTRATION; INTRACAUDAL; PERINEURAL ONCE
Status: DISCONTINUED | OUTPATIENT
Start: 2022-04-21 | End: 2022-04-21 | Stop reason: HOSPADM

## 2022-04-21 RX ORDER — MEPERIDINE HYDROCHLORIDE 25 MG/ML
12.5 INJECTION INTRAMUSCULAR; INTRAVENOUS; SUBCUTANEOUS ONCE AS NEEDED
Status: DISCONTINUED | OUTPATIENT
Start: 2022-04-21 | End: 2022-04-21 | Stop reason: HOSPADM

## 2022-04-21 RX ADMIN — PHENYLEPHRINE HYDROCHLORIDE 100 MCG: 10 INJECTION INTRAVENOUS at 10:04

## 2022-04-21 RX ADMIN — CEFAZOLIN 2 G: 330 INJECTION, POWDER, FOR SOLUTION INTRAMUSCULAR; INTRAVENOUS at 09:04

## 2022-04-21 RX ADMIN — ROCURONIUM BROMIDE 20 MG: 10 SOLUTION INTRAVENOUS at 10:04

## 2022-04-21 RX ADMIN — ROCURONIUM BROMIDE 50 MG: 10 SOLUTION INTRAVENOUS at 09:04

## 2022-04-21 RX ADMIN — PHENYLEPHRINE HYDROCHLORIDE 200 MCG: 10 INJECTION INTRAVENOUS at 10:04

## 2022-04-21 RX ADMIN — SODIUM CHLORIDE, SODIUM LACTATE, POTASSIUM CHLORIDE, AND CALCIUM CHLORIDE: 600; 310; 30; 20 INJECTION, SOLUTION INTRAVENOUS at 10:04

## 2022-04-21 RX ADMIN — SODIUM CHLORIDE, SODIUM LACTATE, POTASSIUM CHLORIDE, AND CALCIUM CHLORIDE: 600; 310; 30; 20 INJECTION, SOLUTION INTRAVENOUS at 09:04

## 2022-04-21 RX ADMIN — LIDOCAINE HYDROCHLORIDE 100 MG: 20 INJECTION, SOLUTION INTRAVENOUS at 09:04

## 2022-04-21 RX ADMIN — ONDANSETRON HYDROCHLORIDE 4 MG: 2 INJECTION INTRAMUSCULAR; INTRAVENOUS at 09:04

## 2022-04-21 RX ADMIN — PROPOFOL 250 MG: 10 INJECTION, EMULSION INTRAVENOUS at 09:04

## 2022-04-21 RX ADMIN — MIDAZOLAM HYDROCHLORIDE 2 MG: 1 INJECTION, SOLUTION INTRAMUSCULAR; INTRAVENOUS at 09:04

## 2022-04-21 RX ADMIN — SUGAMMADEX 200 MG: 100 INJECTION, SOLUTION INTRAVENOUS at 11:04

## 2022-04-21 RX ADMIN — FENTANYL CITRATE 50 MCG: 50 INJECTION, SOLUTION INTRAMUSCULAR; INTRAVENOUS at 09:04

## 2022-04-21 RX ADMIN — DEXMEDETOMIDINE HYDROCHLORIDE 10 MCG: 100 INJECTION, SOLUTION, CONCENTRATE INTRAVENOUS at 10:04

## 2022-04-21 RX ADMIN — FENTANYL CITRATE 100 MCG: 50 INJECTION, SOLUTION INTRAMUSCULAR; INTRAVENOUS at 09:04

## 2022-04-21 RX ADMIN — DEXAMETHASONE SODIUM PHOSPHATE 10 MG: 4 INJECTION, SOLUTION INTRAMUSCULAR; INTRAVENOUS at 09:04

## 2022-04-21 NOTE — ANESTHESIA POSTPROCEDURE EVALUATION
Anesthesia Post Evaluation    Patient: Larry Madden    Procedure(s) Performed: Procedure(s) (LRB):  EXCISION, NASAL TURBINATE, SUBMUCOSAL (Bilateral)  SEPTOPLASTY, NOSE with PDS plate (N/A)  RECONSTRUCTION, NOSE-nasal reconstruction with osteotomes, possible  grafts (N/A)  REDUCTION, NASAL TURBINATE    Final Anesthesia Type: general      Patient location during evaluation: PACU  Patient participation: Yes- Able to Participate  Level of consciousness: awake and alert  Post-procedure vital signs: reviewed and stable  Pain management: adequate  Airway patency: patent    PONV status at discharge: No PONV  Anesthetic complications: no      Cardiovascular status: blood pressure returned to baseline  Respiratory status: unassisted and spontaneous ventilation  Hydration status: euvolemic  Follow-up not needed.          Vitals Value Taken Time   /91 04/21/22 1310   Temp 36.8 °C (98.3 °F) 04/21/22 1243   Pulse 100 04/21/22 1310   Resp 16 04/21/22 1310   SpO2 97 % 04/21/22 1310         Event Time   Out of Recovery 12:41:00         Pain/Eneida Score: Eneida Score: 10 (4/21/2022  1:10 PM)

## 2022-04-21 NOTE — PATIENT INSTRUCTIONS
DISCHARGE INSTRUCTIONS:    Discharge Procedure Orders   Diet Adult Regular     Lifting restrictions   Order Comments: No heavy lifting or straining  Avoid contact sports  No nose blowing   Sneeze with mouth open     Leave dressing on - Keep it clean, dry, and intact until clinic visit     Notify your health care provider if you experience any of the following:  temperature >100.4     Notify your health care provider if you experience any of the following:  severe uncontrolled pain     Notify your health care provider if you experience any of the following:  redness, tenderness, or signs of infection (pain, swelling, redness, odor or green/yellow discharge around incision site)

## 2022-04-21 NOTE — DISCHARGE SUMMARY
[facility] ([place])  Discharge Note  Short Stay    Procedure(s) (LRB):  EXCISION, NASAL TURBINATE, SUBMUCOSAL (Bilateral)  SEPTOPLASTY, NOSE with PDS plate (N/A)  RECONSTRUCTION, NOSE-nasal reconstruction with osteotomes, possible  grafts (N/A)  REDUCTION, NASAL TURBINATE    OUTCOME: Patient tolerated treatment/procedure well without complication and is now ready for discharge.    DISPOSITION: Home or Self Care    FINAL DIAGNOSIS:  Nasal obstruction    FOLLOWUP: In clinic    DISCHARGE INSTRUCTIONS:    Discharge Procedure Orders   Diet Adult Regular     Lifting restrictions   Order Comments: No heavy lifting or straining  Avoid contact sports  No nose blowing   Sneeze with mouth open     Leave dressing on - Keep it clean, dry, and intact until clinic visit     Notify your health care provider if you experience any of the following:  temperature >100.4     Notify your health care provider if you experience any of the following:  severe uncontrolled pain     Notify your health care provider if you experience any of the following:  redness, tenderness, or signs of infection (pain, swelling, redness, odor or green/yellow discharge around incision site)        TIME SPENT ON DISCHARGE: 15 minutes

## 2022-04-21 NOTE — OP NOTE
[facility]  General Surgery  Operative Note    SUMMARY     Date of Procedure: 4/21/2022     Procedure:   1. Nasal reconstruction with caudal extension graft and bilateral osteotomies (CPT 88070)  2. Septal cartilage graft (CPT 53026)  3. Septoplasty (CPT 91032)  4. 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 General anesthesia was administered and the patient was successfully intubated by the anesthesia team. The bed was then turned 90 degrees. 6 cc of 1% lidocaine with epinephrine was injected throughout the nasal cavity and external nose. Epinephrine soaked pledgets were placed in the bilateral nasal cavities.     The patient was prepped and draped in a sterile fashion. The pledgets were removed. An inverted V incision was made at the columella, and the skin was elevated off the nasal cartilages. Next, the medial crura was divided down to the level of the septum. The mucoperichondrium was then elevated off the septum to the bony-cartilaginous junction. The nasal bone and cartilage were disarticulated. [place] were used to  remove the devaited bony portion, and a yvan was used to remove the posterior deviated cartilaginous septum. Next, bilateral osteotomies were performed using a 4mm osteotome. Next, a vallejo knife was used to remove the anterior bowed septum. The previously removed cartilage was then shaped and used as a caudal extension graft. The graft was secured to the posterior remaining septum and maxllary spine with monocryl sutures on Gustavo needles. Next, the medial crura were reapproximated.. The inverted V incision was closed with Nylon and chromic sutures. A coapting suture was then completed over the septum using a chromic suture.     Next, a submucosal resection of the turbinates was completed using the turbinate shaver. Gel foam was placed in the right nasal cavity. Then rolled Telfa packs were placed bilaterally and sutures externally with a nylon suture. A rhinoplasty cast was then placed over the dorsum. An OG tube was used for gastric decompression. The patient was then turned back to the anesthesia team where the patient was extubated successfully. There were no complications.    Estimated Blood Loss (EBL): 10 cc           Implants: * No implants in log *    Specimens:   Specimen (24h ago, onward)            None                  Condition: Good    Disposition: PACU - hemodynamically stable.

## 2022-04-21 NOTE — ANESTHESIA PROCEDURE NOTES
Intubation    Date/Time: 4/21/2022 9:38 AM  Performed by: Karlee Cantu  Authorized by: Tahir Kasper MD     Intubation:     Induction:  Intravenous    Intubated:  Postinduction    Mask Ventilation:  Easy mask    Attempts:  1    Attempted By:  CRNA    Method of Intubation:  Video laryngoscopy    Blade:  Matthews 3    Laryngeal View Grade: Grade I - full view of cords      Difficult Airway Encountered?: No      Complications:  None    Airway Device:  Oral travon    Airway Device Size:  8.0    Style/Cuff Inflation:  Cuffed (inflated to minimal occlusive pressure)    Tube secured:  23    Secured at:  The lips    Placement Verified By:  Capnometry    Complicating Factors:  None    Findings Post-Intubation:  BS equal bilateral and atraumatic/condition of teeth unchanged

## 2022-04-21 NOTE — H&P
One year S/P ORNF septoplasty,SMR turbs and five months since last visit.  He is still having epistaxis episodes which have become more frequent in the last month or two.  He has been following all of his epistaxis precautions.    Dorsal nasal deviation to the right and septal bowing to his left remains anteriorly which is causing his left-sided obstruction.  His crusting on the right Kiesselbach's plexus is still present and some areas of fresh bleeding evident.    I have cauterized this area with silver nitrate today.  We have discussed revision surgery again to attempt to correct his left-sided nasal obstruction.  Plan: Saline spray/gel  Proceed with revision nasal reconstruction, septoplasty, submucous resection of turbinates.

## 2022-04-21 NOTE — DISCHARGE INSTRUCTIONS
Anesthesia: After Your Surgery  Youve just had surgery. During surgery, you received medication called anesthesia to keep you comfortable and pain-free. After surgery, you may experience some pain or nausea. This is common. Here are some tips for feeling better and recovering after surgery.    Going home  Your doctor or nurse will show you how to take care of yourself when you go home. He or she will also answer your questions. Have an adult family member or friend drive you home. For the first 24 hours after your surgery:  Do not drive or use heavy equipment.  Do not make important decisions or sign legal documents.  Avoid alcohol.  Have someone stay with you, if needed. He or she can watch for problems and help keep you safe.  Take your time getting up from a seated or lying position. You may experience dizziness for 24 hours  Be sure to keep all follow-up appointments with your doctor. And rest after your procedure for as long as your doctor tells you to.    Coping with pain  If you have pain after surgery, pain medication will help you feel better. Take it as directed, before pain becomes severe. Also, ask your doctor or pharmacist about other ways to control pain, such as with heat, ice, and relaxation. And follow any other instructions your surgeon or nurse gives you.    URINARY RETENTION  Should you experience a decrease in your urine output or are unable to urinate following surgery, this can be due to the medications given during surgery.  We recommend you going to the nearest Emergency Department.    Tips for taking pain medication  To get the best relief possible, remember these points:  Pain medications can upset your stomach. Taking them with a little food may help.  Most pain relievers taken by mouth need at least 20 to 30 minutes to take effect.  Taking medication on a schedule can help you remember to take it. Try to time your medication so that you can take it before beginning an activity, such  as dressing, walking, or sitting down for dinner.  Constipation is a common side effect of pain medications. Contact your doctor before taking any medications like laxatives or stool softeners to help relieve constipation. Also ask about any dietary restrictions, because drinking lots of fluids and eating foods like fruits and vegetables that are high in fiber can also help. Remember, dont take laxatives unless your surgeon has prescribed them.  Mixing alcohol and pain medication can cause dizziness and slow your breathing. It can even be fatal. Dont drink alcohol while taking pain medication.  Pain medication can slow your reflexes. Dont drive or operate machinery while taking pain medication.  If your health care provider tells you to take acetaminophen to help relieve your pain, ask him or her how much you are supposed to take each day. (Acetaminophen is the generic name for Tylenol and other brand-name pain relievers.) Acetaminophen or other pain relievers may interact with your prescription medicines or other over-the-counter (OTC) drugs. Some prescription medications contain acetaminophen along with other active ingredients. Using both prescription and OTC acetaminophen for pain can cause you to overdose. The FDA recommends that you read the labels on your OTC medications carefully. This will help you to clearly understand the list of active ingredients, dosing instructions, and any warnings. It may also help you avoid taking too much acetaminophen. If you have questions or don't understand the information, ask your pharmacist or health care provider to explain it to you before you take the OTC medication.    Managing nausea  Some people have an upset stomach after surgery. This is often due to anesthesia, pain, pain medications, or the stress of surgery. The following tips will help you manage nausea and get good nutrition as you recover. If you were on a special diet before surgery, ask your doctor if you  "should follow it during recovery. These tips may help:  Dont push yourself to eat. Your body will tell you when to eat and how much.  Start off with clear liquids and soup. They are easier to digest.  Progress to semi-solid foods (mashed potatoes, applesauce, and gelatin) as you feel ready.  Slowly move to solid foods. Dont eat fatty, rich, or spicy foods at first.  Dont force yourself to have three large meals a day. Instead, eat smaller amounts more often.  Take pain medications with a small amount of solid food, such as crackers or toast to avoid nausea.      Call your surgeon if    You feel too sleepy, dizzy, or groggy (medication may be too strong).  You have side effects like nausea, vomiting, or skin changes (rash, itching, or hives).   © 0885-7301 [employer]. [address] [place] [address]. All rights reserved. This information is not intended as a substitute for professional medical care. Always follow your healthcare professional's instructions.                  Sinus and Nasal Surgery Post-Op Instructions      Below are post-operative instructions to assist with your recovery. If you experience any of the following, call us IMMMEDIATELY:   ? temperature of 101°F or greater   ? any redness spreading away from incision site   ? any unusual, painful swelling near incision site   ? any active bleeding that saturates more than a 4"x4" gauze   ? any pus drainage that is green or yellow in color   ? changes in vision or swelling around the eye     Also, a temperature of ° F is your body's normal reaction to surgery/anesthesia--if you are concerned about having a temperature, check it prior to taking your pain medication as it contains Tylenol that may hide your fever.       What to Expect the First Few Days After Surgery   After surgery you may have a lot of nasal drainage that can be clear and bloody. THIS IS NORMAL.   You may gently dab your nose. Avoid excessive sniffing. "   Let all drainage fall on your mustache dressing (a small piece of gauze rolled up under your nose, secured with tape) and change it as needed.   If you have packing in your nose, it is secured. DO NOT remove it or try to move it around. It may move a little but will not come out. The color of the packing will become bloody but this is normal.   You may have facial pressure and even some headaches from the packing and/or the anesthesia. However, DO NOT maneuver the packing. Support the packing with the moustache dressing.   The packing cannot be removed any sooner than your first post-op appointment to provide you with the maximum healing time.   It is normal for the tip or base of your nose to feel numb. This will gradually get better over the next few weeks.   If you have an external splint on your nose, DO NOT remove it or get it wet. This will be removed by your physician at your first post-op appointment.     Activity   ? Sleep on your back, elevated with 2-3 pillows for the first 2 weeks.   ? Wear RAFAELA hose for 2 days following surgery until you are able to move about at home.   ? No airplane travel for 2 weeks.   ? No sexual activity for 2 weeks.   ? No heavy lifting (greater than 10 pounds) for 3 weeks.   ? No straining for 3 weeks.   ? No strenuous exercise or bending over for long periods of time for 3 weeks.   ? No blowing nose for 3 weeks. You may sneeze with your mouth open (not through your nose).   ? No swimming for 4-6 weeks.   ? No operating a motor vehicle until external splint has been removed and until you are no longer taking pain medication.   ? Do not be around or operate heavy machinery while taking pain medication.   ? Do not smoke or be around smokers.   ? Avoid exposure to cold and anything that may trigger allergies and sneezing.     Dressings   1. Change the mustache dressing (small piece of gauze rolled under your nose and secured with tape) as needed. You will have pink or red nasal  drainage for 2-3 days.   2. If you have packing inside both nostrils, the packing will turn from white to red from the nasal drainage. This is to be expected; do not be alarmed. DO NOT touch or pull at the packing. It will not come out. The packing will be removed by your doctor at your first post-op appointment the following week. If the packing becomes very dry or hard, you (or the nurse) may drip Ocean Spray/saline spray on the packing to moisten it as often as needed. This helps with decreasing nasal pain and also makes the packing removal easier on the day of your post-op appointment.   3. After surgery, you may have a lot of nasal drainage. This is normal. You may gently dab your nose. Avoid excessive sniffing. Let all drainage fall on your mustache dressing (small piece of gauze rolled up under your nose, secured with tape) and change it as needed.   4. You may shower 24 hours after surgery. However, if there is an external splint, do not let it get wet and only shower from neck down.   5. Apply cold compresses (washcloths or cotton gauze soaked in ice water) to your cheeks and eyes for 20 minutes every 2 hours for the first 2-3 days or however you can tolerate. This will help in minimizing swelling and bruising.     Massages (ONLY for patients getting a Nasal Reconstruction and/or a Rhinoplasty)   Nasal massages are performed to help with swelling and to contour/shape your nose. If your surgery is one that requires nasal massages, your doctor will instruct you how to do these at your first post-op appointment. Do not start these until told to do so.   When you begin, do 10-12 strokes 3-4 times a day. It will be sensitive, this is normal.   1. Start with index fingers together at tip of nose.   2. Bring fingers up nose with medium pressure, keeping them together the entire time.   3. When you get to top/bridge of nose, separate fingers, bringing each fingertip down the side of your nose and out towards  cheekbone.     Post-Op Appointments   Following sinus surgery, you will be seen once a week for 3-4 weeks in the clinic. During these appointments, the inside of your nose will be cleaned to assist in healing. Your first 4 appointments have already been scheduled by the nurse.   Following nasal surgery, you will be seen 5-6 days after surgery. Then you will have an appointment 3 weeks after it-about 1 month following surgery.     Medications   You will get a prescription for an antibiotic, a pain medication, and an anti-nausea medication. Start your antibiotics when you get home and take them as instructed until they are all gone. It is best to take them with food to prevent an upset stomach. If your antibiotic gives you diarrhea, take a probiotic such as Lactinex to help.   Pain medication may cause constipation. If you have NOT had a bowel movement 3-5 days after surgery, take Colace (an over-the-counter stool softener). If this does not help, try Senokot or Miralax. Call the nurse if you have any issues.   Purchase these over-the-counter medications before your surgery:   Ocean Spray (Saline): THIS IS A MUST. Use every 2 hours while awake for the first few days once you are cleared to begin use. You cannot overdo the saline, it can only help. You can space it out to every 4-6 hours as you feel better. If you go home from the hospital without packing in nose, start saline spray the day following surgery.   Bacitracin ointment: Apply once daily to the edge of both nostrils with a Q-Tip (unless you are allergic). DO NOT insert more than ½ of the cotton tip into your nostril. Apply to any external sutures.   Afrin (regular strength): Only use for severe nasal congestion or bleeding. Use 2 times per day for 3 days, stop for 1 day, continue 2 times per day for 3 days, and then stop completely. Only do this after your first post-op appointment.   FOR SINUS PATIENTS ONLY: Carefully read the medicated sinus rinses  instructions. The medication is ordered from a compounding pharmacy who will contact you by phone and ship the medication to your home. DO NOT begin these until after your first post-op appointment.     Diet     Begin with bland foods the day after surgery and gradually return to your regular diet as you are ready. Smoothies and protein drinks are a great for those who have packing in their nose as it is easy to tolerate. Drink plenty of fluids (water is best).   ? Avoid hot and spicy foods for at least 2 weeks.   ? Avoid hard and chewy foods for 2 weeks.   ? Avoid foods such as Mexican, Chinese, Seafood, or salty soups for 2 weeks after surgery.   ? Avoid alcoholic and caffeinated beverages for 2 weeks after surgery.     If you have any questions or concerns, call our clinic at [phone], Monday-Friday from 8:00 AM to 5:00 PM. After hours and on the weekends, call [phone] and ask to speak with the ENT resident on call.     MARIETTA Orantes III, MD, FACS   Otolaryngology, Head and Neck Surgery   Facial Plastic and Reconstructive Surgery

## 2022-04-21 NOTE — TRANSFER OF CARE
Anesthesia Transfer of Care Note    Patient: Larry Madden    Procedure(s) Performed: Procedure(s) (LRB):  EXCISION, NASAL TURBINATE, SUBMUCOSAL (Bilateral)  SEPTOPLASTY, NOSE with PDS plate (N/A)  RECONSTRUCTION, NOSE-nasal reconstruction with osteotomes, possible  grafts (N/A)  REDUCTION, NASAL TURBINATE    Patient location: PACU    Anesthesia Type: general    Transport from OR: Transported from OR on 6-10 L/min O2 by face mask with adequate spontaneous ventilation    Post pain: adequate analgesia    Post assessment: no apparent anesthetic complications and tolerated procedure well    Post vital signs: stable    Level of consciousness: responds to stimulation    Nausea/Vomiting: no nausea/vomiting    Complications: none    Transfer of care protocol was followed      Last vitals:   Visit Vitals  BP (!) 109/57 (BP Location: Right arm, Patient Position: Lying)   Pulse 82   Temp 36.4 °C (97.5 °F) (Skin)   Resp 16   Wt 90.7 kg (200 lb)   SpO2 97%   BMI 29.53 kg/m²

## 2022-04-21 NOTE — PLAN OF CARE
Larry Madden has met all discharge criteria from Phase II. Vital Signs are stable, ambulating  without difficulty. Discharge instructions given, patient verbalized understanding. Discharged from facility via wheelchair in stable condition.

## 2022-04-21 NOTE — OR NURSING
VSS on RA. Pt states pain is tolerable. Dressings intact with scant drainage and PIV C/D/I. Meets Phase I discharge criteria. Ready for transfer to Phase II. Family notified.

## 2022-04-24 ENCOUNTER — NURSE TRIAGE (OUTPATIENT)
Dept: ADMINISTRATIVE | Facility: CLINIC | Age: 32
End: 2022-04-24
Payer: COMMERCIAL

## 2022-04-24 NOTE — TELEPHONE ENCOUNTER
[facility] [address] [facility]  [phone]  Please call in refil of NORCO to the above pharmacy. Patient states he only has 3 remaining and he is in pain and will need more.  Reason for Disposition   [1] SEVERE post-op pain (e.g., excruciating, pain scale 8-10) AND [2] not controlled with pain medications    Additional Information   Negative: Sounds like a life-threatening emergency to the triager   Negative: Chest pain   Negative: Difficulty breathing   Negative: Acting confused (e.g., disoriented, slurred speech) or excessively sleepy   Negative: Surgical incision symptoms and questions   Negative: [1] Discomfort (pain, burning or stinging) when passing urine AND [2] male   Negative: [1] Discomfort (pain, burning or stinging) when passing urine AND [2] female   Negative: Constipation     4/20   Negative: New or worsening leg (calf, thigh) pain   Negative: New or worsening leg swelling   Negative: Dizziness is severe, or persists > 24 hours after surgery   Negative: Pain, redness, swelling, or pus at IV Site   Negative: Symptoms arising from use of a urinary catheter (Rodriguez or Coude)   Negative: Cast problems or questions   Negative: Medication question   Negative: [1] Widespread rash AND [2] bright red, sunburn-like   Negative: [1] SEVERE headache AND [2] after spinal (epidural) anesthesia   Negative: [1] Vomiting AND [2] persists > 4 hours   Negative: [1] Vomiting AND [2] abdomen looks much more swollen than usual   Negative: [1] Drinking very little AND [2] dehydration suspected (e.g., no urine > 12 hours, very dry mouth, very lightheaded)   Negative: Patient sounds very sick or weak to the triager   Negative: Sounds like a serious complication to the triager   Negative: Fever > 100.4 F (38.0 C)    Protocols used: POST-OP SYMPTOMS AND [id number]

## 2022-04-27 ENCOUNTER — OFFICE VISIT (OUTPATIENT)
Dept: OTOLARYNGOLOGY | Facility: CLINIC | Age: 32
End: 2022-04-27
Payer: COMMERCIAL

## 2022-04-27 VITALS — TEMPERATURE: 97 F | SYSTOLIC BLOOD PRESSURE: 138 MMHG | HEART RATE: 80 BPM | DIASTOLIC BLOOD PRESSURE: 92 MMHG

## 2022-04-27 DIAGNOSIS — Z98.890 POST-OPERATIVE STATE: ICD-10-CM

## 2022-04-27 DIAGNOSIS — M95.0 NASAL DEFORMITY, ACQUIRED: Primary | ICD-10-CM

## 2022-04-27 PROCEDURE — 1160F RVW MEDS BY RX/DR IN RCRD: CPT | Mod: CPTII,S$GLB,, | Performed by: OTOLARYNGOLOGY

## 2022-04-27 PROCEDURE — 1159F MED LIST DOCD IN RCRD: CPT | Mod: CPTII,S$GLB,, | Performed by: OTOLARYNGOLOGY

## 2022-04-27 PROCEDURE — 3075F PR MOST RECENT SYSTOLIC BLOOD PRESS GE 130-139MM HG: ICD-10-PCS | Mod: CPTII,S$GLB,, | Performed by: OTOLARYNGOLOGY

## 2022-04-27 PROCEDURE — 3080F DIAST BP >= 90 MM HG: CPT | Mod: CPTII,S$GLB,, | Performed by: OTOLARYNGOLOGY

## 2022-04-27 PROCEDURE — 99024 PR POST-OP FOLLOW-UP VISIT: ICD-10-PCS | Mod: S$GLB,,, | Performed by: OTOLARYNGOLOGY

## 2022-04-27 PROCEDURE — 1160F PR REVIEW ALL MEDS BY PRESCRIBER/CLIN PHARMACIST DOCUMENTED: ICD-10-PCS | Mod: CPTII,S$GLB,, | Performed by: OTOLARYNGOLOGY

## 2022-04-27 PROCEDURE — 3075F SYST BP GE 130 - 139MM HG: CPT | Mod: CPTII,S$GLB,, | Performed by: OTOLARYNGOLOGY

## 2022-04-27 PROCEDURE — 3080F PR MOST RECENT DIASTOLIC BLOOD PRESSURE >= 90 MM HG: ICD-10-PCS | Mod: CPTII,S$GLB,, | Performed by: OTOLARYNGOLOGY

## 2022-04-27 PROCEDURE — 1159F PR MEDICATION LIST DOCUMENTED IN MEDICAL RECORD: ICD-10-PCS | Mod: CPTII,S$GLB,, | Performed by: OTOLARYNGOLOGY

## 2022-04-27 PROCEDURE — 99024 POSTOP FOLLOW-UP VISIT: CPT | Mod: S$GLB,,, | Performed by: OTOLARYNGOLOGY

## 2022-04-27 NOTE — PROGRESS NOTES
One week S/P nasal reconstruction with caudal extension graft and bilateral osteotomies,septo,turbs.  All packs,splints,and sutures removed.  Healing well,septum and nose straight,airway clear.  Reviewed post op instructions including massages.  RTC 3 weeks

## 2022-05-04 ENCOUNTER — IMMUNIZATION (OUTPATIENT)
Dept: INTERNAL MEDICINE | Facility: CLINIC | Age: 32
End: 2022-05-04
Payer: COMMERCIAL

## 2022-05-04 DIAGNOSIS — Z23 NEED FOR VACCINATION: Primary | ICD-10-CM

## 2022-05-04 PROCEDURE — 91305 COVID-19, MRNA, LNP-S, PF, 30 MCG/0.3 ML DOSE VACCINE (PFIZER): CPT | Mod: PBBFAC | Performed by: INTERNAL MEDICINE

## 2022-05-17 ENCOUNTER — TELEPHONE (OUTPATIENT)
Dept: OTOLARYNGOLOGY | Facility: CLINIC | Age: 32
End: 2022-05-17
Payer: COMMERCIAL

## 2022-05-17 NOTE — TELEPHONE ENCOUNTER
----- Message from Connie Astudillo sent at 5/17/2022  9:59 AM CDT -----  Regarding: change time  Name of Who is Calling: Larry           What is the request in detail: Patient would like a call back to find out if a morning appointment is available for tomorrow.            Can the clinic reply by [facility]: No           What Number to Call Back if not in [facility]: [phone]

## 2022-05-18 ENCOUNTER — OFFICE VISIT (OUTPATIENT)
Dept: OTOLARYNGOLOGY | Facility: CLINIC | Age: 32
End: 2022-05-18
Payer: COMMERCIAL

## 2022-05-18 VITALS
DIASTOLIC BLOOD PRESSURE: 83 MMHG | SYSTOLIC BLOOD PRESSURE: 133 MMHG | TEMPERATURE: 97 F | BODY MASS INDEX: 29.86 KG/M2 | HEIGHT: 69 IN | WEIGHT: 201.63 LBS | HEART RATE: 74 BPM

## 2022-05-18 DIAGNOSIS — M95.0 NASAL DEFORMITY, ACQUIRED: Primary | ICD-10-CM

## 2022-05-18 DIAGNOSIS — Z98.890 POST-OPERATIVE STATE: ICD-10-CM

## 2022-05-18 DIAGNOSIS — J34.2 NASAL SEPTAL DEVIATION: ICD-10-CM

## 2022-05-18 PROCEDURE — 1160F PR REVIEW ALL MEDS BY PRESCRIBER/CLIN PHARMACIST DOCUMENTED: ICD-10-PCS | Mod: CPTII,S$GLB,, | Performed by: OTOLARYNGOLOGY

## 2022-05-18 PROCEDURE — 99024 PR POST-OP FOLLOW-UP VISIT: ICD-10-PCS | Mod: S$GLB,,, | Performed by: OTOLARYNGOLOGY

## 2022-05-18 PROCEDURE — 99024 POSTOP FOLLOW-UP VISIT: CPT | Mod: S$GLB,,, | Performed by: OTOLARYNGOLOGY

## 2022-05-18 PROCEDURE — 1159F MED LIST DOCD IN RCRD: CPT | Mod: CPTII,S$GLB,, | Performed by: OTOLARYNGOLOGY

## 2022-05-18 PROCEDURE — 1159F PR MEDICATION LIST DOCUMENTED IN MEDICAL RECORD: ICD-10-PCS | Mod: CPTII,S$GLB,, | Performed by: OTOLARYNGOLOGY

## 2022-05-18 PROCEDURE — 3008F PR BODY MASS INDEX (BMI) DOCUMENTED: ICD-10-PCS | Mod: CPTII,S$GLB,, | Performed by: OTOLARYNGOLOGY

## 2022-05-18 PROCEDURE — 3079F DIAST BP 80-89 MM HG: CPT | Mod: CPTII,S$GLB,, | Performed by: OTOLARYNGOLOGY

## 2022-05-18 PROCEDURE — 3079F PR MOST RECENT DIASTOLIC BLOOD PRESSURE 80-89 MM HG: ICD-10-PCS | Mod: CPTII,S$GLB,, | Performed by: OTOLARYNGOLOGY

## 2022-05-18 PROCEDURE — 1160F RVW MEDS BY RX/DR IN RCRD: CPT | Mod: CPTII,S$GLB,, | Performed by: OTOLARYNGOLOGY

## 2022-05-18 PROCEDURE — 3008F BODY MASS INDEX DOCD: CPT | Mod: CPTII,S$GLB,, | Performed by: OTOLARYNGOLOGY

## 2022-05-18 PROCEDURE — 3075F SYST BP GE 130 - 139MM HG: CPT | Mod: CPTII,S$GLB,, | Performed by: OTOLARYNGOLOGY

## 2022-05-18 PROCEDURE — 3075F PR MOST RECENT SYSTOLIC BLOOD PRESS GE 130-139MM HG: ICD-10-PCS | Mod: CPTII,S$GLB,, | Performed by: OTOLARYNGOLOGY

## 2022-05-18 NOTE — PROGRESS NOTES
One month S/P nasal reconstruction with caudal extension graft and bilateral osteotomies,septo,turbs.  He was doing well until approximately 1 week ago when he was in [place] where he lives part-time and developed left-sided severe nose bleed.  He was seen by an ENT there and underwent cauterization of prominent vessels of the septum.  Today his exam reveals his dorsum to be straight and well supported but bilateral intranasal crusting at the anterior aspect of his septum.  Reviewed post op instructions including massages.  I have given him epistaxis precaution sheet and described all of the recommendations with him in detail.  I have asked him to maintain extreme moisture intranasally as well as his bacitracin ointment which I have placed intranasally bilaterally today.  RTC 3 weeks or p.r.n. sooner.

## 2022-05-20 ENCOUNTER — OFFICE VISIT (OUTPATIENT)
Dept: HEPATOLOGY | Facility: CLINIC | Age: 32
End: 2022-05-20
Payer: COMMERCIAL

## 2022-05-20 VITALS
HEART RATE: 80 BPM | SYSTOLIC BLOOD PRESSURE: 121 MMHG | OXYGEN SATURATION: 96 % | HEIGHT: 69 IN | DIASTOLIC BLOOD PRESSURE: 81 MMHG | RESPIRATION RATE: 18 BRPM | TEMPERATURE: 98 F | WEIGHT: 201.5 LBS | BODY MASS INDEX: 29.84 KG/M2

## 2022-05-20 DIAGNOSIS — R74.8 ELEVATED LIVER ENZYMES: Primary | ICD-10-CM

## 2022-05-20 PROCEDURE — 3008F PR BODY MASS INDEX (BMI) DOCUMENTED: ICD-10-PCS | Mod: CPTII,S$GLB,, | Performed by: NURSE PRACTITIONER

## 2022-05-20 PROCEDURE — 99204 PR OFFICE/OUTPT VISIT, NEW, LEVL IV, 45-59 MIN: ICD-10-PCS | Mod: S$GLB,,, | Performed by: NURSE PRACTITIONER

## 2022-05-20 PROCEDURE — 3074F SYST BP LT 130 MM HG: CPT | Mod: CPTII,S$GLB,, | Performed by: NURSE PRACTITIONER

## 2022-05-20 PROCEDURE — 3074F PR MOST RECENT SYSTOLIC BLOOD PRESSURE < 130 MM HG: ICD-10-PCS | Mod: CPTII,S$GLB,, | Performed by: NURSE PRACTITIONER

## 2022-05-20 PROCEDURE — 1159F PR MEDICATION LIST DOCUMENTED IN MEDICAL RECORD: ICD-10-PCS | Mod: CPTII,S$GLB,, | Performed by: NURSE PRACTITIONER

## 2022-05-20 PROCEDURE — 3008F BODY MASS INDEX DOCD: CPT | Mod: CPTII,S$GLB,, | Performed by: NURSE PRACTITIONER

## 2022-05-20 PROCEDURE — 1159F MED LIST DOCD IN RCRD: CPT | Mod: CPTII,S$GLB,, | Performed by: NURSE PRACTITIONER

## 2022-05-20 PROCEDURE — 1160F RVW MEDS BY RX/DR IN RCRD: CPT | Mod: CPTII,S$GLB,, | Performed by: NURSE PRACTITIONER

## 2022-05-20 PROCEDURE — 3079F PR MOST RECENT DIASTOLIC BLOOD PRESSURE 80-89 MM HG: ICD-10-PCS | Mod: CPTII,S$GLB,, | Performed by: NURSE PRACTITIONER

## 2022-05-20 PROCEDURE — 3079F DIAST BP 80-89 MM HG: CPT | Mod: CPTII,S$GLB,, | Performed by: NURSE PRACTITIONER

## 2022-05-20 PROCEDURE — 99999 PR PBB SHADOW E&M-EST. PATIENT-LVL V: CPT | Mod: PBBFAC,,, | Performed by: NURSE PRACTITIONER

## 2022-05-20 PROCEDURE — 99999 PR PBB SHADOW E&M-EST. PATIENT-LVL V: ICD-10-PCS | Mod: PBBFAC,,, | Performed by: NURSE PRACTITIONER

## 2022-05-20 PROCEDURE — 1160F PR REVIEW ALL MEDS BY PRESCRIBER/CLIN PHARMACIST DOCUMENTED: ICD-10-PCS | Mod: CPTII,S$GLB,, | Performed by: NURSE PRACTITIONER

## 2022-05-20 PROCEDURE — 99204 OFFICE O/P NEW MOD 45 MIN: CPT | Mod: S$GLB,,, | Performed by: NURSE PRACTITIONER

## 2022-05-20 NOTE — PROGRESS NOTES
[facility] - New Patient     REFERRING PROVIDER: Tonareferral Self  PCP: Miguel Black MD    Chief Complaint: Fatty liver      HISTORY     This is a 32 y.o. male self-referred for evaluation of elevated liver tests and possible fatty liver.     Patient with reported h/o elevated liver enzymes though no labs for review. Concerned that he may have fatty liver.    Review of labs in our system (last available results from ):  - Transaminases: WNL  - Alk phos: WNL  - Synthetic liver function: WNL  - Platelets: WNL    No prior liver workup.    Risk factors for fatty liver:  · Weight -- Body mass index is 29.76 kg/m².  · Weight is up ~30 lb from the start of the pandemic. Travels for his job and eats out frequently.                          · Dyslipidemia -- no                                · Insulin resistance / diabetes -- no         · Hypertension -- no  · Alcohol use -- twice per week, ~4 drinks/sitting    Family history:  No family history of liver disease.  +alcoholism     Meds:   No concerning meds from liver standpoint.  No OTC or herbal supplements.  Has been on most of his medications for ~2 years, no recent changes.               Past medical history, surgical history, problem list, family history, social history, allergies: Reviewed and updated in the appropriate section of the electronic medical record.      Current Outpatient Medications   Medication Sig Dispense Refill    albuterol (PROVENTIL/VENTOLIN HFA) 90 mcg/actuation inhaler SMARTSI-2 Puff(s) By Mouth Every 4-6 Hours PRN      cetirizine (ZYRTEC) 10 MG tablet Take 10 mg by mouth.      diazePAM (VALIUM) 5 MG tablet Take 5 mg by mouth every 12 (twelve) hours as needed for Anxiety.      EPINEPHrine (EPIPEN) 0.3 mg/0.3 mL AtIn SMARTSI.3 Milliliter(s) IM Once PRN      finasteride (PROSCAR) 5 mg tablet TK 1/2 T PO EVERY OTHER DAY  12    fluticasone (FLONASE) 50 mcg/actuation nasal spray 1 spray (50 mcg total) by Each Nare  route once daily. 1 Bottle 0    fluvoxaMINE (LUVOX) 150 mg 24 hr capsule       fluvoxaMINE 100 mg Cp24       HYDROcodone-acetaminophen (NORCO) 5-325 mg per tablet Take 1 tablet by mouth every 6 (six) hours as needed for Pain. 15 tablet 0    montelukast (SINGULAIR) 10 mg tablet Take 10 mg by mouth every evening.      ondansetron (ZOFRAN-ODT) 4 MG TbDL Take 1 tablet (4 mg total) by mouth every 6 (six) hours as needed (Nausea). 15 tablet 0    ubrogepant (UBRELVY)tablet 100 mg Take 1 tablet (100 mg total) by mouth every 2 (two) hours as needed. 30 tablet 3     No current facility-administered medications for this visit.     Medication list reviewed and updated.      Review of Systems   Constitutional: Negative for fatigue. +weight gain  Respiratory: Negative for shortness of breath.    Cardiovascular: Negative for leg swelling.  Gastrointestinal: Negative for abdominal distention, abdominal pain, diarrhea, constipation, nausea, and vomiting. Negative for blood in stool, melena, or hematemesis.  Musculoskeletal: Negative for myalgias.    Skin: Negative for itching.  Neurological: Negative for dizziness or tremors. Negative for confusion, slowed mentation, or memory loss.   Hematological: Does not bruise/bleed easily.   Psychiatric: Negative for mood changes or sleep disturbance.      Physical Exam   Constitutional: Well-nourished. No distress. Alert and oriented.  Eyes: No scleral icterus.   Pulmonary/Chest: Respiratory effort normal. No respiratory distress.   Abdominal: No distension, no ascites appreciated.   Extremities: No edema.   Neurological: No tremor or asterixis. Gait normal.  Skin: No jaundice. No spider telangiectasias or palmar erythema.  Psychiatric: Normal mood and affect. Speech, behavior, and thought content normal. No depression or anxiety noted.       Vitals reviewed.  /81 (BP Location: Right arm, Patient Position: Sitting, BP Method: Small (Automatic))   Pulse 80   Temp 98.2 °F (36.8  "°C) (Oral)   Resp 18   Ht 5' 9" (1.753 m)   Wt 91.4 kg (201 lb 8 oz)   SpO2 96%   BMI 29.76 kg/m²         LABS & DIAGNOSTIC STUDIES     I have personally reviewed pertinent laboratory findings:    Lab Results   Component Value Date    ALT 19 11/02/2020    AST 16 11/02/2020    ALKPHOS 59 11/02/2020    BILITOT 0.4 11/02/2020    ALBUMIN 4.2 11/02/2020       Lab Results   Component Value Date    WBC 5.26 04/18/2022    HGB 15.5 04/18/2022    HCT 45.8 04/18/2022    MCV 95 04/18/2022     04/18/2022       Lab Results   Component Value Date     04/18/2022    K 4.7 04/18/2022    BUN 14 04/18/2022    CREATININE 1.0 04/18/2022    ESTGFRAFRICA >60 04/18/2022    EGFRNONAA >60 04/18/2022       No results found for: SMOOTHMUSCAB, AMAIFA, IGGSERUM, ANASCREEN, FERRITIN, FESATURATED, PETH, [id number], CERULOPLSM, HEPBSAG, HEPBIGM, HEPBCAB, HEPCAB, HCVRNAQUANTP, HEPAIGM, [id number]    No results found for: AFP    I have personally reviewed the following result reports:  No abdominal imaging for review        ASSESSMENT & PLAN     32 y.o. male with:    1. Reported h/o elevated liver enzymes  -- Transaminases WNL in 2020, will update now. If elevated, may likely have fatty liver with enzymes increasing due to weight gain.  -- Limited serologic workup: screen for viral hepatitis, iron studies  -- Fibroscan for fibrosis and steatosis staging  -- Discussed that if determined to have fatty liver, the only treatment is weight loss, maintaining good control of risk factors (blood pressure, cholesterol, and blood sugar), and drinking alcohol in moderation. Will update lipid panel.  -- Interested in dietician consult, referral placed       Orders Placed This Encounter   Procedures    FibroScan (Vibration Controlled Transient Elastography)    Hepatic Function Panel    Ferritin    Hepatitis A antibody, IgG    Hepatitis B Core Antibody, Total    Hepatitis B Surface Ab, Qualitative    Hepatitis B Surface Antigen    " Hepatitis C Antibody    Iron and TIBC    Lipid Panel    Ambulatory Referral/Consult to Nutrition Services - [facility]       *See AVS for patient education and instructions.       Return to clinic pending above results.      Thank you for allowing me to participate in the care of Larry Metz, FNP-C  Hepatology        Duration of encounter: 45 min  This includes face to face time and non-face to face time preparing to see the patient (eg, review of tests), obtaining and/or reviewing separately obtained history, documenting clinical information in the electronic or other health record, independently interpreting results and communicating results to the patient/family/caregiver, or Care coordination.

## 2022-05-20 NOTE — PATIENT INSTRUCTIONS
Will get labs to recheck liver enzymes and few additional tests  Fibroscan in our clinic to assess for fatty liver  Referral to dietician  Follow-up pending results

## 2022-05-23 ENCOUNTER — PATIENT MESSAGE (OUTPATIENT)
Dept: HEPATOLOGY | Facility: CLINIC | Age: 32
End: 2022-05-23

## 2022-05-23 ENCOUNTER — LAB VISIT (OUTPATIENT)
Dept: LAB | Facility: HOSPITAL | Age: 32
End: 2022-05-23
Payer: COMMERCIAL

## 2022-05-23 ENCOUNTER — PROCEDURE VISIT (OUTPATIENT)
Dept: HEPATOLOGY | Facility: CLINIC | Age: 32
End: 2022-05-23
Payer: COMMERCIAL

## 2022-05-23 DIAGNOSIS — K76.0 FATTY LIVER: Primary | ICD-10-CM

## 2022-05-23 DIAGNOSIS — Z23 NEED FOR HEPATITIS A AND B VACCINATION: ICD-10-CM

## 2022-05-23 DIAGNOSIS — R74.8 ELEVATED LIVER ENZYMES: ICD-10-CM

## 2022-05-23 LAB
ALBUMIN SERPL BCP-MCNC: 4.2 G/DL (ref 3.5–5.2)
ALP SERPL-CCNC: 61 U/L (ref 55–135)
ALT SERPL W/O P-5'-P-CCNC: 41 U/L (ref 10–44)
AST SERPL-CCNC: 18 U/L (ref 10–40)
BILIRUB DIRECT SERPL-MCNC: 0.2 MG/DL (ref 0.1–0.3)
BILIRUB SERPL-MCNC: 0.3 MG/DL (ref 0.1–1)
CHOLEST SERPL-MCNC: 153 MG/DL (ref 120–199)
CHOLEST/HDLC SERPL: 3.1 {RATIO} (ref 2–5)
FERRITIN SERPL-MCNC: 179 NG/ML (ref 20–300)
HAV IGG SER QL IA: NEGATIVE
HBV CORE AB SERPL QL IA: NEGATIVE
HBV SURFACE AB SER-ACNC: NEGATIVE M[IU]/ML
HBV SURFACE AG SERPL QL IA: NEGATIVE
HCV AB SERPL QL IA: NEGATIVE
HDLC SERPL-MCNC: 50 MG/DL (ref 40–75)
HDLC SERPL: 32.7 % (ref 20–50)
IRON SERPL-MCNC: 113 UG/DL (ref 45–160)
LDLC SERPL CALC-MCNC: 85.2 MG/DL (ref 63–159)
NONHDLC SERPL-MCNC: 103 MG/DL
PROT SERPL-MCNC: 6.9 G/DL (ref 6–8.4)
SATURATED IRON: 29 % (ref 20–50)
TOTAL IRON BINDING CAPACITY: 388 UG/DL (ref 250–450)
TRANSFERRIN SERPL-MCNC: 262 MG/DL (ref 200–375)
TRIGL SERPL-MCNC: 89 MG/DL (ref 30–150)

## 2022-05-23 PROCEDURE — 91200 LIVER ELASTOGRAPHY: CPT | Mod: S$GLB,,, | Performed by: NURSE PRACTITIONER

## 2022-05-23 PROCEDURE — 80076 HEPATIC FUNCTION PANEL: CPT | Performed by: NURSE PRACTITIONER

## 2022-05-23 PROCEDURE — 91200 FIBROSCAN (VIBRATION CONTROLLED TRANSIENT ELASTOGRAPHY): ICD-10-PCS | Mod: S$GLB,,, | Performed by: NURSE PRACTITIONER

## 2022-05-23 PROCEDURE — 80061 LIPID PANEL: CPT | Performed by: NURSE PRACTITIONER

## 2022-05-23 PROCEDURE — 36415 COLL VENOUS BLD VENIPUNCTURE: CPT | Performed by: NURSE PRACTITIONER

## 2022-05-23 PROCEDURE — 86803 HEPATITIS C AB TEST: CPT | Performed by: NURSE PRACTITIONER

## 2022-05-23 PROCEDURE — 86706 HEP B SURFACE ANTIBODY: CPT | Performed by: NURSE PRACTITIONER

## 2022-05-23 PROCEDURE — 86704 HEP B CORE ANTIBODY TOTAL: CPT | Performed by: NURSE PRACTITIONER

## 2022-05-23 PROCEDURE — 84466 ASSAY OF TRANSFERRIN: CPT | Performed by: NURSE PRACTITIONER

## 2022-05-23 PROCEDURE — 86790 VIRUS ANTIBODY NOS: CPT | Performed by: NURSE PRACTITIONER

## 2022-05-23 PROCEDURE — 82728 ASSAY OF FERRITIN: CPT | Performed by: NURSE PRACTITIONER

## 2022-05-23 PROCEDURE — 87340 HEPATITIS B SURFACE AG IA: CPT | Performed by: NURSE PRACTITIONER

## 2022-05-23 NOTE — PROCEDURES
FibroScan (Vibration Controlled Transient Elastography)    Date/Time: 5/23/2022 8:00 AM  Performed by: Vaishali Metz NP  Authorized by: Vaishali Metz NP     Diagnosis:  Other    Probe:  M    Universal Protocol: Patient's identity, procedure and site were verified, confirmatory pause was performed.  Discussed procedure including risks and potential complications.  Questions answered.  Patient verbalizes understanding and wishes to proceed with VCTE.     Procedure: After providing explanations of the procedure, patient was placed in the supine position with right arm in maximum abduction to allow optimal exposure of right lateral abdomen.  Patient was briefly assessed, Testing was performed in the mid-axillary location, 50Hz Shear Wave pulses were applied and the resulting Shear Wave and Propagation Speed detected with a 3.5 MHz ultrasonic signal, using the FibroScan probe, Skin to liver capsule distance and liver parenchyma were accessed during the entire examination with the FibroScan probe, Patient was instructed to breathe normally and to abstain from sudden movements during the procedure, allowing for random measurements of liver stiffness. At least 10 Shear Waves were produced, Individual measurements of each Shear Wave were calculated.  Patient tolerated the procedure well with no complications.  Meets discharge criteria as was dismissed.  Rates pain 0 out of 10.  Patient will follow up with ordering provider to review results.      Findings  Median liver stiffness score:  3.8  CAP Reading: dB/m:  259    IQR/med %:  13  Interpretation  Fibrosis interpretation is based on medial liver stiffness - Kilopascal (kPa).    Fibrosis Stage:  F 0-1  Steatosis interpretation is based on controlled attenuation parameter - (dB/m).    Steatosis Grade:  S1

## 2022-05-23 NOTE — TELEPHONE ENCOUNTER
Patient notified of lab / Fibroscan results and recommendations via [facility] portal.    Hep A/B vaccines ordered.

## 2022-05-24 ENCOUNTER — PATIENT MESSAGE (OUTPATIENT)
Dept: HEPATOLOGY | Facility: CLINIC | Age: 32
End: 2022-05-24
Payer: COMMERCIAL

## 2022-05-24 DIAGNOSIS — K76.0 FATTY LIVER: Primary | ICD-10-CM

## 2022-05-24 DIAGNOSIS — Z23 NEED FOR HEPATITIS A AND B VACCINATION: ICD-10-CM

## 2022-06-20 ENCOUNTER — OFFICE VISIT (OUTPATIENT)
Dept: OTOLARYNGOLOGY | Facility: CLINIC | Age: 32
End: 2022-06-20
Payer: COMMERCIAL

## 2022-06-20 VITALS — SYSTOLIC BLOOD PRESSURE: 114 MMHG | HEART RATE: 72 BPM | TEMPERATURE: 98 F | DIASTOLIC BLOOD PRESSURE: 74 MMHG

## 2022-06-20 DIAGNOSIS — J34.2 NASAL SEPTAL DEVIATION: ICD-10-CM

## 2022-06-20 DIAGNOSIS — M95.0 NASAL DEFORMITY, ACQUIRED: Primary | ICD-10-CM

## 2022-06-20 DIAGNOSIS — Z98.890 POST-OPERATIVE STATE: ICD-10-CM

## 2022-06-20 PROCEDURE — 1159F PR MEDICATION LIST DOCUMENTED IN MEDICAL RECORD: ICD-10-PCS | Mod: CPTII,S$GLB,, | Performed by: OTOLARYNGOLOGY

## 2022-06-20 PROCEDURE — 99024 PR POST-OP FOLLOW-UP VISIT: ICD-10-PCS | Mod: S$GLB,,, | Performed by: OTOLARYNGOLOGY

## 2022-06-20 PROCEDURE — 1160F RVW MEDS BY RX/DR IN RCRD: CPT | Mod: CPTII,S$GLB,, | Performed by: OTOLARYNGOLOGY

## 2022-06-20 PROCEDURE — 99024 POSTOP FOLLOW-UP VISIT: CPT | Mod: S$GLB,,, | Performed by: OTOLARYNGOLOGY

## 2022-06-20 PROCEDURE — 1159F MED LIST DOCD IN RCRD: CPT | Mod: CPTII,S$GLB,, | Performed by: OTOLARYNGOLOGY

## 2022-06-20 PROCEDURE — 1160F PR REVIEW ALL MEDS BY PRESCRIBER/CLIN PHARMACIST DOCUMENTED: ICD-10-PCS | Mod: CPTII,S$GLB,, | Performed by: OTOLARYNGOLOGY

## 2022-06-20 PROCEDURE — 3074F SYST BP LT 130 MM HG: CPT | Mod: CPTII,S$GLB,, | Performed by: OTOLARYNGOLOGY

## 2022-06-20 PROCEDURE — 3078F DIAST BP <80 MM HG: CPT | Mod: CPTII,S$GLB,, | Performed by: OTOLARYNGOLOGY

## 2022-06-20 PROCEDURE — 3074F PR MOST RECENT SYSTOLIC BLOOD PRESSURE < 130 MM HG: ICD-10-PCS | Mod: CPTII,S$GLB,, | Performed by: OTOLARYNGOLOGY

## 2022-06-20 PROCEDURE — 3078F PR MOST RECENT DIASTOLIC BLOOD PRESSURE < 80 MM HG: ICD-10-PCS | Mod: CPTII,S$GLB,, | Performed by: OTOLARYNGOLOGY

## 2022-06-20 NOTE — PROGRESS NOTES
Two months S/P nasal reconstruction with caudal extension graft and bilateral osteotomies,septo,turbs.  He has had no further nasal bleeding since his last visit  Today his exam reveals his dorsum to be straight and well supported with only see intranasal crusting at the anterior aspect of his septum on the left side.  Reviewed post op instructions including massages.  I have again discussed epistaxis precautions with him in detail.  RTC: 1 month

## 2022-06-25 ENCOUNTER — OFFICE VISIT (OUTPATIENT)
Dept: URGENT CARE | Facility: CLINIC | Age: 32
End: 2022-06-25
Payer: COMMERCIAL

## 2022-06-25 VITALS
DIASTOLIC BLOOD PRESSURE: 85 MMHG | TEMPERATURE: 99 F | BODY MASS INDEX: 29.77 KG/M2 | WEIGHT: 201 LBS | OXYGEN SATURATION: 96 % | HEART RATE: 98 BPM | RESPIRATION RATE: 18 BRPM | HEIGHT: 69 IN | SYSTOLIC BLOOD PRESSURE: 123 MMHG

## 2022-06-25 DIAGNOSIS — R51.9 FRONTAL HEADACHE: ICD-10-CM

## 2022-06-25 DIAGNOSIS — J02.9 SORE THROAT: Primary | ICD-10-CM

## 2022-06-25 DIAGNOSIS — H93.8X3 PRESSURE SENSATION IN BOTH EARS: ICD-10-CM

## 2022-06-25 DIAGNOSIS — R09.82 POST-NASAL DRIP: ICD-10-CM

## 2022-06-25 PROBLEM — J45.20 ASTHMA, INTERMITTENT, UNCOMPLICATED: Status: ACTIVE | Noted: 2018-11-08

## 2022-06-25 PROBLEM — E55.9 VITAMIN D DEFICIENCY: Status: ACTIVE | Noted: 2018-11-08

## 2022-06-25 PROBLEM — J30.89 OTHER ALLERGIC RHINITIS: Status: ACTIVE | Noted: 2018-11-08

## 2022-06-25 PROBLEM — F41.9 ANXIETY AND DEPRESSION: Status: ACTIVE | Noted: 2018-11-08

## 2022-06-25 PROBLEM — F41.8 PERFORMANCE ANXIETY: Status: ACTIVE | Noted: 2018-11-08

## 2022-06-25 PROBLEM — F32.A ANXIETY AND DEPRESSION: Status: ACTIVE | Noted: 2018-11-08

## 2022-06-25 PROBLEM — L64.9 MALE PATTERN BALDNESS: Status: ACTIVE | Noted: 2018-11-08

## 2022-06-25 LAB
CTP QC/QA: YES
CTP QC/QA: YES
MOLECULAR STREP A: NEGATIVE
SARS-COV-2 RDRP RESP QL NAA+PROBE: NEGATIVE

## 2022-06-25 PROCEDURE — U0002: ICD-10-PCS | Mod: QW,S$GLB,, | Performed by: NURSE PRACTITIONER

## 2022-06-25 PROCEDURE — 3074F PR MOST RECENT SYSTOLIC BLOOD PRESSURE < 130 MM HG: ICD-10-PCS | Mod: CPTII,S$GLB,, | Performed by: NURSE PRACTITIONER

## 2022-06-25 PROCEDURE — 1160F PR REVIEW ALL MEDS BY PRESCRIBER/CLIN PHARMACIST DOCUMENTED: ICD-10-PCS | Mod: CPTII,S$GLB,, | Performed by: NURSE PRACTITIONER

## 2022-06-25 PROCEDURE — 99213 OFFICE O/P EST LOW 20 MIN: CPT | Mod: S$GLB,,, | Performed by: NURSE PRACTITIONER

## 2022-06-25 PROCEDURE — U0002 COVID-19 LAB TEST NON-CDC: HCPCS | Mod: QW,S$GLB,, | Performed by: NURSE PRACTITIONER

## 2022-06-25 PROCEDURE — 1159F MED LIST DOCD IN RCRD: CPT | Mod: CPTII,S$GLB,, | Performed by: NURSE PRACTITIONER

## 2022-06-25 PROCEDURE — 3079F PR MOST RECENT DIASTOLIC BLOOD PRESSURE 80-89 MM HG: ICD-10-PCS | Mod: CPTII,S$GLB,, | Performed by: NURSE PRACTITIONER

## 2022-06-25 PROCEDURE — 3008F BODY MASS INDEX DOCD: CPT | Mod: CPTII,S$GLB,, | Performed by: NURSE PRACTITIONER

## 2022-06-25 PROCEDURE — 1160F RVW MEDS BY RX/DR IN RCRD: CPT | Mod: CPTII,S$GLB,, | Performed by: NURSE PRACTITIONER

## 2022-06-25 PROCEDURE — 3079F DIAST BP 80-89 MM HG: CPT | Mod: CPTII,S$GLB,, | Performed by: NURSE PRACTITIONER

## 2022-06-25 PROCEDURE — 87651 STREP A DNA AMP PROBE: CPT | Mod: QW,S$GLB,, | Performed by: NURSE PRACTITIONER

## 2022-06-25 PROCEDURE — 99213 PR OFFICE/OUTPT VISIT, EST, LEVL III, 20-29 MIN: ICD-10-PCS | Mod: S$GLB,,, | Performed by: NURSE PRACTITIONER

## 2022-06-25 PROCEDURE — 87651 POCT STREP A MOLECULAR: ICD-10-PCS | Mod: QW,S$GLB,, | Performed by: NURSE PRACTITIONER

## 2022-06-25 PROCEDURE — 3074F SYST BP LT 130 MM HG: CPT | Mod: CPTII,S$GLB,, | Performed by: NURSE PRACTITIONER

## 2022-06-25 PROCEDURE — 1159F PR MEDICATION LIST DOCUMENTED IN MEDICAL RECORD: ICD-10-PCS | Mod: CPTII,S$GLB,, | Performed by: NURSE PRACTITIONER

## 2022-06-25 PROCEDURE — 3008F PR BODY MASS INDEX (BMI) DOCUMENTED: ICD-10-PCS | Mod: CPTII,S$GLB,, | Performed by: NURSE PRACTITIONER

## 2022-06-25 RX ORDER — NAPROXEN 500 MG/1
500 TABLET ORAL 2 TIMES DAILY
COMMUNITY
Start: 2022-06-15 | End: 2022-08-30

## 2022-06-25 NOTE — PATIENT INSTRUCTIONS
Please drink plenty of fluids.    Please get plenty of rest.    Please return here or go to the Emergency Department for any concerns or worsening of condition.    We recommend you take over the counter Flonase (Fluticasone)after consult with ENT    If not allergic, please take over the counter Tylenol (Acetaminophen) and/or Motrin (Ibuprofen) as directed for control of pain and/or fever.    Please follow up with your ENT and/or primary care doctor or specialist as needed.

## 2022-06-25 NOTE — PROGRESS NOTES
"Subjective:       Patient ID: Larry Madden is a 32 y.o. male.    Vitals:  height is 5' 9" (1.753 m) and weight is 91.2 kg (201 lb). His temperature is 98.5 °F (36.9 °C). His blood pressure is 123/85 and his pulse is 98. His respiration is 18 and oxygen saturation is 96%.     Chief Complaint: Sore Throat (Entered by patient)    32 y.o. male presents today with a sore throat, headache, rhinorrhea and swollen glands. Pt states this began on Wednesday and has gradually worsened. Pt administered an at home Covid test which resulted as Negative. Pt declines any pain.     32-year-old male presents to clinic with complaints of sore throat, headache, runny nose and swollen glands x 4 days. History positive for Pfizer covid vaccine x 4 doses 3/11/21, 4/1/21, 10/22/21, 5/4/22 and influenza vaccine 9/4/21. History of ENT and pulmonary disorders: chronic allergic rhinitis, nasal obstruction, deviated nasal septum, asthma and obstructive sleep apnea. Treated with singular, Zyrtec and albuterol MDI. On 6/20/22 he had a follow up appointment with Dr. Rg Orantes III otolaryngology two months status postnasal reconstruction with application of cartilage extension graft,  bilateral osteotomies, septoplasty, turbinate reduction performed on 4/21/22.    Sore Throat   This is a new problem. The current episode started in the past 7 days (wednesday). The problem has been gradually worsening. Neither side of throat is experiencing more pain than the other. There has been no fever. The patient is experiencing no pain. Associated symptoms include headaches and swollen glands. Pertinent negatives include no abdominal pain, congestion, coughing, diarrhea, drooling, ear discharge, ear pain, hoarse voice, plugged ear sensation, neck pain, shortness of breath, stridor, trouble swallowing or vomiting. Associated symptoms comments: rhinorrhea. He has had no exposure to strep or mono. He has tried nothing for the symptoms. "       Constitution: Negative for chills and fever.   HENT: Positive for postnasal drip, sinus pressure and sore throat. Negative for ear pain, ear discharge, drooling, congestion and trouble swallowing.    Neck: Negative for neck pain, neck stiffness and painful lymph nodes.   Respiratory: Negative for cough, shortness of breath and stridor.    Gastrointestinal: Negative for abdominal pain, vomiting and diarrhea.   Allergic/Immunologic: Negative for seasonal allergies and sneezing.   Neurological: Positive for headaches. Negative for disorientation and altered mental status.   Hematologic/Lymphatic: Negative for swollen lymph nodes.   Psychiatric/Behavioral: Negative for altered mental status, disorientation and confusion.       Objective:      Physical Exam   Constitutional: He is oriented to person, place, and time. He appears well-developed. He is cooperative.  Non-toxic appearance. He does not appear ill. No distress.   HENT:   Head: Normocephalic and atraumatic.   Ears:   Right Ear: Hearing, external ear and ear canal normal. Tympanic membrane is bulging. Tympanic membrane mobility is normal.   Left Ear: Hearing, external ear and ear canal normal. Tympanic membrane is bulging.   Nose: Mucosal edema present. No rhinorrhea or nasal deformity. No epistaxis. Right sinus exhibits frontal sinus tenderness. Right sinus exhibits no maxillary sinus tenderness. Left sinus exhibits frontal sinus tenderness. Left sinus exhibits no maxillary sinus tenderness.   Mouth/Throat: Uvula is midline, oropharynx is clear and moist and mucous membranes are normal. No trismus in the jaw. Normal dentition. No uvula swelling. No oropharyngeal exudate, posterior oropharyngeal edema or posterior oropharyngeal erythema.   Eyes: Conjunctivae and lids are normal. No scleral icterus.   Neck: Trachea normal and phonation normal. Neck supple. No edema present. No erythema present. No neck rigidity present.   Cardiovascular: Normal rate, regular  rhythm, normal heart sounds and normal pulses.   Pulmonary/Chest: Effort normal and breath sounds normal. No respiratory distress. He has no decreased breath sounds. He has no rhonchi.   Abdominal: Normal appearance.   Musculoskeletal: Normal range of motion.         General: No deformity. Normal range of motion.   Neurological: He is alert and oriented to person, place, and time. He exhibits normal muscle tone. Coordination normal.   Skin: Skin is warm, dry, intact, not diaphoretic and not pale.   Psychiatric: His speech is normal and behavior is normal. Judgment and thought content normal.   Nursing note and vitals reviewed.        Assessment:       1. Sore throat    2. Frontal headache    3. Post-nasal drip    4. Pressure sensation in both ears        Results for orders placed or performed in visit on 06/25/22   POCT COVID-19 Rapid Screening   Result Value Ref Range    POC Rapid COVID Negative Negative     Acceptable Yes    POCT Strep A, Molecular   Result Value Ref Range    Molecular Strep A, POC Negative Negative     Acceptable Yes      Plan:         Sore throat  -     POCT COVID-19 Rapid Screening  -     POCT Strep A, Molecular  -     diphenhydrAMINE-aluminum-magnesium hydroxide-simethicone-LIDOcaine HCl 2%; Swish and spit 15 mLs every 4 (four) hours as needed (sore throat).  Dispense: 240 mL; Refill: 0    Frontal headache    Post-nasal drip    Pressure sensation in both ears         Patient Instructions   Please drink plenty of fluids.    Please get plenty of rest.    Please return here or go to the Emergency Department for any concerns or worsening of condition.    We recommend you take over the counter Flonase (Fluticasone)after consult with ENT    If not allergic, please take over the counter Tylenol (Acetaminophen) and/or Motrin (Ibuprofen) as directed for control of pain and/or fever.    Please follow up with your ENT and/or primary care doctor or specialist as  needed.

## 2022-07-23 ENCOUNTER — OFFICE VISIT (OUTPATIENT)
Dept: URGENT CARE | Facility: CLINIC | Age: 32
End: 2022-07-23
Payer: COMMERCIAL

## 2022-07-23 VITALS
OXYGEN SATURATION: 98 % | DIASTOLIC BLOOD PRESSURE: 83 MMHG | BODY MASS INDEX: 28.63 KG/M2 | HEIGHT: 70 IN | HEART RATE: 92 BPM | RESPIRATION RATE: 18 BRPM | SYSTOLIC BLOOD PRESSURE: 126 MMHG | TEMPERATURE: 99 F | WEIGHT: 200 LBS

## 2022-07-23 DIAGNOSIS — U07.1 COVID-19 VIRUS INFECTION: Primary | ICD-10-CM

## 2022-07-23 DIAGNOSIS — R53.83 FATIGUE, UNSPECIFIED TYPE: ICD-10-CM

## 2022-07-23 DIAGNOSIS — R05.9 COUGH: ICD-10-CM

## 2022-07-23 DIAGNOSIS — R09.81 NASAL CONGESTION: ICD-10-CM

## 2022-07-23 DIAGNOSIS — J02.9 SORE THROAT: ICD-10-CM

## 2022-07-23 DIAGNOSIS — J31.0 RHINITIS, UNSPECIFIED TYPE: ICD-10-CM

## 2022-07-23 PROBLEM — R04.0 EPISTAXIS: Status: ACTIVE | Noted: 2022-07-23

## 2022-07-23 PROBLEM — J45.20 MILD INTERMITTENT ASTHMA: Status: ACTIVE | Noted: 2022-07-23

## 2022-07-23 PROBLEM — J30.9 ALLERGIC RHINITIS DUE TO ALLERGEN: Status: ACTIVE | Noted: 2022-07-23

## 2022-07-23 PROBLEM — F41.8 MIXED ANXIETY AND DEPRESSIVE DISORDER: Status: ACTIVE | Noted: 2022-07-23

## 2022-07-23 PROBLEM — L20.9 ATOPIC DERMATITIS: Status: ACTIVE | Noted: 2022-07-23

## 2022-07-23 PROBLEM — J30.1 ALLERGIC RHINITIS DUE TO POLLEN: Status: ACTIVE | Noted: 2022-07-23

## 2022-07-23 LAB
CTP QC/QA: YES
SARS-COV-2 RDRP RESP QL NAA+PROBE: POSITIVE

## 2022-07-23 PROCEDURE — 1159F PR MEDICATION LIST DOCUMENTED IN MEDICAL RECORD: ICD-10-PCS | Mod: CPTII,S$GLB,, | Performed by: NURSE PRACTITIONER

## 2022-07-23 PROCEDURE — 1160F PR REVIEW ALL MEDS BY PRESCRIBER/CLIN PHARMACIST DOCUMENTED: ICD-10-PCS | Mod: CPTII,S$GLB,, | Performed by: NURSE PRACTITIONER

## 2022-07-23 PROCEDURE — 99214 PR OFFICE/OUTPT VISIT, EST, LEVL IV, 30-39 MIN: ICD-10-PCS | Mod: S$GLB,,, | Performed by: NURSE PRACTITIONER

## 2022-07-23 PROCEDURE — 3079F PR MOST RECENT DIASTOLIC BLOOD PRESSURE 80-89 MM HG: ICD-10-PCS | Mod: CPTII,S$GLB,, | Performed by: NURSE PRACTITIONER

## 2022-07-23 PROCEDURE — 3074F SYST BP LT 130 MM HG: CPT | Mod: CPTII,S$GLB,, | Performed by: NURSE PRACTITIONER

## 2022-07-23 PROCEDURE — 3008F PR BODY MASS INDEX (BMI) DOCUMENTED: ICD-10-PCS | Mod: CPTII,S$GLB,, | Performed by: NURSE PRACTITIONER

## 2022-07-23 PROCEDURE — U0002: ICD-10-PCS | Mod: QW,S$GLB,, | Performed by: NURSE PRACTITIONER

## 2022-07-23 PROCEDURE — 99214 OFFICE O/P EST MOD 30 MIN: CPT | Mod: S$GLB,,, | Performed by: NURSE PRACTITIONER

## 2022-07-23 PROCEDURE — 1159F MED LIST DOCD IN RCRD: CPT | Mod: CPTII,S$GLB,, | Performed by: NURSE PRACTITIONER

## 2022-07-23 PROCEDURE — 1160F RVW MEDS BY RX/DR IN RCRD: CPT | Mod: CPTII,S$GLB,, | Performed by: NURSE PRACTITIONER

## 2022-07-23 PROCEDURE — 3079F DIAST BP 80-89 MM HG: CPT | Mod: CPTII,S$GLB,, | Performed by: NURSE PRACTITIONER

## 2022-07-23 PROCEDURE — 3074F PR MOST RECENT SYSTOLIC BLOOD PRESSURE < 130 MM HG: ICD-10-PCS | Mod: CPTII,S$GLB,, | Performed by: NURSE PRACTITIONER

## 2022-07-23 PROCEDURE — 3008F BODY MASS INDEX DOCD: CPT | Mod: CPTII,S$GLB,, | Performed by: NURSE PRACTITIONER

## 2022-07-23 PROCEDURE — U0002 COVID-19 LAB TEST NON-CDC: HCPCS | Mod: QW,S$GLB,, | Performed by: NURSE PRACTITIONER

## 2022-07-23 RX ORDER — ESKETAMINE HYDROCHLORIDE 28 MG/.2ML
SOLUTION NASAL
COMMUNITY
Start: 2022-07-22 | End: 2022-08-30

## 2022-07-23 RX ORDER — BENZONATATE 100 MG/1
100 CAPSULE ORAL 3 TIMES DAILY PRN
Qty: 30 CAPSULE | Refills: 0 | Status: SHIPPED | OUTPATIENT
Start: 2022-07-23 | End: 2022-08-02

## 2022-07-23 NOTE — PROGRESS NOTES
"Subjective:       Patient ID: Larry Madden is a 32 y.o. male.    Vitals:  height is 5' 10" (1.778 m) and weight is 90.7 kg (200 lb). His temperature is 99.4 °F (37.4 °C). His blood pressure is 126/83 and his pulse is 92. His respiration is 18 and oxygen saturation is 98%.     Chief Complaint: URI    Patient states he has had fever, sore throat, and fatigue for the ibqr23ves.    32-year-old male presents to clinic with complaints of fever, watery nasal secretions, sore throat and fatigue x 1 day. Recent travel via airline returned on Tuesday. Reports home test for covid with positive results today, requesting Paxlovid. History positive for Pfizer covid vaccine x 4 doses 3/11/21, 4/1/21, 10/22/21, 5/4/22, asthma, allergic rhinitis, deviated nasal septum, nasal obstruction and GIOVANNA. On 4/21/22 a surgical procedure was performed for nasal reconstruction, nasal turbinate reduction, nasal septoplasty and application of cartilage graft.     URI   This is a new problem. The current episode started yesterday. The problem has been gradually worsening. Associated symptoms include congestion, coughing, headaches, joint pain and a sore throat. Pertinent negatives include no abdominal pain, sinus pain or vomiting. He has tried acetaminophen for the symptoms.       Constitution: Positive for fatigue and fever. Negative for chills and sweating.   HENT: Positive for congestion, postnasal drip, sinus pressure and sore throat. Negative for sinus pain.    Neck: Negative for painful lymph nodes.   Respiratory: Positive for cough. Negative for asthma.    Gastrointestinal: Negative for abdominal pain and vomiting.   Allergic/Immunologic: Positive for environmental allergies, seasonal allergies and recurrent sinus infections. Negative for asthma.   Neurological: Positive for headaches. Negative for disorientation and altered mental status.   Hematologic/Lymphatic: Negative for swollen lymph nodes.   Psychiatric/Behavioral: Negative " for altered mental status, disorientation and confusion.       Objective:      Physical Exam   Constitutional: He is oriented to person, place, and time. He appears well-developed. He is cooperative.  Non-toxic appearance. He does not appear ill. No distress.   HENT:   Head: Normocephalic and atraumatic.   Ears:   Right Ear: Hearing, tympanic membrane, external ear and ear canal normal.   Left Ear: Hearing, tympanic membrane, external ear and ear canal normal.   Nose: Mucosal edema and rhinorrhea present. No nasal deformity. No epistaxis. Right sinus exhibits no maxillary sinus tenderness and no frontal sinus tenderness. Left sinus exhibits no maxillary sinus tenderness and no frontal sinus tenderness.   Mouth/Throat: Uvula is midline, oropharynx is clear and moist and mucous membranes are normal. No trismus in the jaw. Normal dentition. No uvula swelling. No oropharyngeal exudate, posterior oropharyngeal edema or posterior oropharyngeal erythema.   Eyes: Conjunctivae and lids are normal. No scleral icterus.   Neck: Trachea normal and phonation normal. Neck supple. No edema present. No erythema present. No neck rigidity present.   Cardiovascular: Normal rate, regular rhythm, normal heart sounds and normal pulses.   Pulmonary/Chest: Effort normal and breath sounds normal. No respiratory distress. He has no decreased breath sounds. He has no rhonchi.   Abdominal: Normal appearance.   Musculoskeletal: Normal range of motion.         General: No deformity. Normal range of motion.   Neurological: He is alert and oriented to person, place, and time. He exhibits normal muscle tone. Coordination normal.   Skin: Skin is warm, dry, intact, not diaphoretic and not pale.   Psychiatric: His speech is normal and behavior is normal. Judgment and thought content normal.   Nursing note and vitals reviewed.        Assessment:       1. COVID-19 virus infection    2. Sore throat    3. Fatigue, unspecified type    4. Cough    5.  Rhinitis, unspecified type    6. Nasal congestion        Results for orders placed or performed in visit on 07/23/22   POCT COVID-19 Rapid Screening   Result Value Ref Range    POC Rapid COVID Positive (A) Negative     Acceptable Yes      Plan:         COVID-19 virus infection  -     nirmatrelvir-ritonavir 300 mg (150 mg x 2)-100 mg copackaged tablets (EUA); Take 3 tablets by mouth 2 (two) times daily for 5 days. Each dose contains 2 nirmatrelvir (pink tablets) and 1 ritonavir (white tablet). Take all 3 tablets together  Dispense: 30 tablet; Refill: 0    Sore throat  -     POCT COVID-19 Rapid Screening    Fatigue, unspecified type    Cough  -     benzonatate (TESSALON) 100 MG capsule; Take 1 capsule (100 mg total) by mouth 3 (three) times daily as needed.  Dispense: 30 capsule; Refill: 0    Rhinitis, unspecified type    Nasal congestion         1 covid risk score        Patient Instructions   Paxlovid adverse reactions, instructions on use, covid risk score at 1 discussed.     Contact ENT surgeon to discuss nasal steroid use    POSITIVE COVID TEST           You have tested positive for COVID-19 today.  Please note that patients who test positive for COVID-19 are required by the CDC to undergo isolation for 5 days after their symptoms first began.           This isolation starts from the day you first developed symptoms, not the day of your positive test. For example, if your symptoms began on a Monday but tested positive on the following Wednesday, your 5-day isolation begins from that Monday, not the Wednesday you tested positive.           However, if you are asymptomatic (a person who does not have any symptoms) and COVID-19 positive, your 5-day isolation begins on the day you tested positive, regardless of exposure date.           Also, per the CDC guidelines, once your 5 days have passed, and you have not had fever greater than 100.4F in the last 24 hours without taking any fever reducers such as  Tylenol (Acetaminophen) or Motrin (Ibuprofen), you may return to your normal activities including social distancing, wearing masks (continually for 5 more days), and frequent handwashing - YOU DO NOT NEED ANOTHER TEST IN ORDER TO END YOUR QUARANTINE.     Please return here or go to the Emergency Department for any concerns or worsening of condition.           Please follow up with your primary care doctor or specialist as needed.

## 2022-07-23 NOTE — PATIENT INSTRUCTIONS
Paxlovid adverse reactions, instructions on use, covid risk score at 1 discussed.     Contact ENT surgeon to discuss nasal steroid use    POSITIVE COVID TEST           You have tested positive for COVID-19 today.  Please note that patients who test positive for COVID-19 are required by the CDC to undergo isolation for 5 days after their symptoms first began.           This isolation starts from the day you first developed symptoms, not the day of your positive test. For example, if your symptoms began on a Monday but tested positive on the following Wednesday, your 5-day isolation begins from that Monday, not the Wednesday you tested positive.           However, if you are asymptomatic (a person who does not have any symptoms) and COVID-19 positive, your 5-day isolation begins on the day you tested positive, regardless of exposure date.           Also, per the CDC guidelines, once your 5 days have passed, and you have not had fever greater than 100.4F in the last 24 hours without taking any fever reducers such as Tylenol (Acetaminophen) or Motrin (Ibuprofen), you may return to your normal activities including social distancing, wearing masks (continually for 5 more days), and frequent handwashing - YOU DO NOT NEED ANOTHER TEST IN ORDER TO END YOUR QUARANTINE.     Please return here or go to the Emergency Department for any concerns or worsening of condition.           Please follow up with your primary care doctor or specialist as needed.

## 2022-08-03 ENCOUNTER — OFFICE VISIT (OUTPATIENT)
Dept: OTOLARYNGOLOGY | Facility: CLINIC | Age: 32
End: 2022-08-03
Payer: COMMERCIAL

## 2022-08-03 VITALS — HEART RATE: 86 BPM | TEMPERATURE: 98 F | SYSTOLIC BLOOD PRESSURE: 120 MMHG | DIASTOLIC BLOOD PRESSURE: 82 MMHG

## 2022-08-03 DIAGNOSIS — J34.2 NASAL SEPTAL DEVIATION: ICD-10-CM

## 2022-08-03 DIAGNOSIS — J34.3 NASAL TURBINATE HYPERTROPHY: ICD-10-CM

## 2022-08-03 DIAGNOSIS — Z98.890 POST-OPERATIVE STATE: ICD-10-CM

## 2022-08-03 DIAGNOSIS — M95.0 NASAL DEFORMITY, ACQUIRED: Primary | ICD-10-CM

## 2022-08-03 PROCEDURE — 3079F DIAST BP 80-89 MM HG: CPT | Mod: CPTII,S$GLB,, | Performed by: OTOLARYNGOLOGY

## 2022-08-03 PROCEDURE — 1160F RVW MEDS BY RX/DR IN RCRD: CPT | Mod: CPTII,S$GLB,, | Performed by: OTOLARYNGOLOGY

## 2022-08-03 PROCEDURE — 1159F MED LIST DOCD IN RCRD: CPT | Mod: CPTII,S$GLB,, | Performed by: OTOLARYNGOLOGY

## 2022-08-03 PROCEDURE — 3074F PR MOST RECENT SYSTOLIC BLOOD PRESSURE < 130 MM HG: ICD-10-PCS | Mod: CPTII,S$GLB,, | Performed by: OTOLARYNGOLOGY

## 2022-08-03 PROCEDURE — 3074F SYST BP LT 130 MM HG: CPT | Mod: CPTII,S$GLB,, | Performed by: OTOLARYNGOLOGY

## 2022-08-03 PROCEDURE — 1160F PR REVIEW ALL MEDS BY PRESCRIBER/CLIN PHARMACIST DOCUMENTED: ICD-10-PCS | Mod: CPTII,S$GLB,, | Performed by: OTOLARYNGOLOGY

## 2022-08-03 PROCEDURE — 1159F PR MEDICATION LIST DOCUMENTED IN MEDICAL RECORD: ICD-10-PCS | Mod: CPTII,S$GLB,, | Performed by: OTOLARYNGOLOGY

## 2022-08-03 PROCEDURE — 99024 POSTOP FOLLOW-UP VISIT: CPT | Mod: S$GLB,,, | Performed by: OTOLARYNGOLOGY

## 2022-08-03 PROCEDURE — 99024 PR POST-OP FOLLOW-UP VISIT: ICD-10-PCS | Mod: S$GLB,,, | Performed by: OTOLARYNGOLOGY

## 2022-08-03 PROCEDURE — 3079F PR MOST RECENT DIASTOLIC BLOOD PRESSURE 80-89 MM HG: ICD-10-PCS | Mod: CPTII,S$GLB,, | Performed by: OTOLARYNGOLOGY

## 2022-08-30 ENCOUNTER — OFFICE VISIT (OUTPATIENT)
Dept: FAMILY MEDICINE | Facility: CLINIC | Age: 32
End: 2022-08-30
Payer: COMMERCIAL

## 2022-08-30 ENCOUNTER — LAB VISIT (OUTPATIENT)
Dept: LAB | Facility: HOSPITAL | Age: 32
End: 2022-08-30
Attending: FAMILY MEDICINE
Payer: COMMERCIAL

## 2022-08-30 VITALS
SYSTOLIC BLOOD PRESSURE: 110 MMHG | DIASTOLIC BLOOD PRESSURE: 62 MMHG | WEIGHT: 208.56 LBS | HEIGHT: 70 IN | OXYGEN SATURATION: 97 % | RESPIRATION RATE: 18 BRPM | BODY MASS INDEX: 29.86 KG/M2 | TEMPERATURE: 98 F | HEART RATE: 94 BPM

## 2022-08-30 DIAGNOSIS — Z88.9 H/O MULTIPLE ALLERGIES: ICD-10-CM

## 2022-08-30 DIAGNOSIS — Z00.00 ANNUAL PHYSICAL EXAM: Primary | ICD-10-CM

## 2022-08-30 DIAGNOSIS — Z00.00 ANNUAL PHYSICAL EXAM: ICD-10-CM

## 2022-08-30 DIAGNOSIS — E66.3 OVERWEIGHT (BMI 25.0-29.9): ICD-10-CM

## 2022-08-30 LAB
ALBUMIN SERPL BCP-MCNC: 4.2 G/DL (ref 3.5–5.2)
ALP SERPL-CCNC: 56 U/L (ref 55–135)
ALT SERPL W/O P-5'-P-CCNC: 39 U/L (ref 10–44)
ANION GAP SERPL CALC-SCNC: 10 MMOL/L (ref 8–16)
AST SERPL-CCNC: 24 U/L (ref 10–40)
BASOPHILS # BLD AUTO: 0.05 K/UL (ref 0–0.2)
BASOPHILS NFR BLD: 0.8 % (ref 0–1.9)
BILIRUB SERPL-MCNC: 0.3 MG/DL (ref 0.1–1)
BUN SERPL-MCNC: 14 MG/DL (ref 6–20)
CALCIUM SERPL-MCNC: 9.6 MG/DL (ref 8.7–10.5)
CHLORIDE SERPL-SCNC: 109 MMOL/L (ref 95–110)
CO2 SERPL-SCNC: 23 MMOL/L (ref 23–29)
CREAT SERPL-MCNC: 0.9 MG/DL (ref 0.5–1.4)
DIFFERENTIAL METHOD: ABNORMAL
EOSINOPHIL # BLD AUTO: 0.1 K/UL (ref 0–0.5)
EOSINOPHIL NFR BLD: 1.1 % (ref 0–8)
ERYTHROCYTE [DISTWIDTH] IN BLOOD BY AUTOMATED COUNT: 12.1 % (ref 11.5–14.5)
EST. GFR  (NO RACE VARIABLE): >60 ML/MIN/1.73 M^2
ESTIMATED AVG GLUCOSE: 100 MG/DL (ref 68–131)
GLUCOSE SERPL-MCNC: 72 MG/DL (ref 70–110)
HBA1C MFR BLD: 5.1 % (ref 4–5.6)
HCT VFR BLD AUTO: 42.4 % (ref 40–54)
HGB BLD-MCNC: 13.9 G/DL (ref 14–18)
IMM GRANULOCYTES # BLD AUTO: 0.02 K/UL (ref 0–0.04)
IMM GRANULOCYTES NFR BLD AUTO: 0.3 % (ref 0–0.5)
LYMPHOCYTES # BLD AUTO: 2.5 K/UL (ref 1–4.8)
LYMPHOCYTES NFR BLD: 40.3 % (ref 18–48)
MCH RBC QN AUTO: 31.8 PG (ref 27–31)
MCHC RBC AUTO-ENTMCNC: 32.8 G/DL (ref 32–36)
MCV RBC AUTO: 97 FL (ref 82–98)
MONOCYTES # BLD AUTO: 0.5 K/UL (ref 0.3–1)
MONOCYTES NFR BLD: 7.9 % (ref 4–15)
NEUTROPHILS # BLD AUTO: 3.1 K/UL (ref 1.8–7.7)
NEUTROPHILS NFR BLD: 49.6 % (ref 38–73)
NRBC BLD-RTO: 0 /100 WBC
PLATELET # BLD AUTO: 277 K/UL (ref 150–450)
PMV BLD AUTO: 11.5 FL (ref 9.2–12.9)
POTASSIUM SERPL-SCNC: 4.2 MMOL/L (ref 3.5–5.1)
PROT SERPL-MCNC: 6.7 G/DL (ref 6–8.4)
RBC # BLD AUTO: 4.37 M/UL (ref 4.6–6.2)
SODIUM SERPL-SCNC: 142 MMOL/L (ref 136–145)
TSH SERPL DL<=0.005 MIU/L-ACNC: 1.36 UIU/ML (ref 0.4–4)
WBC # BLD AUTO: 6.31 K/UL (ref 3.9–12.7)

## 2022-08-30 PROCEDURE — 84443 ASSAY THYROID STIM HORMONE: CPT | Performed by: FAMILY MEDICINE

## 2022-08-30 PROCEDURE — 3078F DIAST BP <80 MM HG: CPT | Mod: CPTII,S$GLB,, | Performed by: FAMILY MEDICINE

## 2022-08-30 PROCEDURE — 3078F PR MOST RECENT DIASTOLIC BLOOD PRESSURE < 80 MM HG: ICD-10-PCS | Mod: CPTII,S$GLB,, | Performed by: FAMILY MEDICINE

## 2022-08-30 PROCEDURE — 3044F PR MOST RECENT HEMOGLOBIN A1C LEVEL <7.0%: ICD-10-PCS | Mod: CPTII,S$GLB,, | Performed by: FAMILY MEDICINE

## 2022-08-30 PROCEDURE — 36415 COLL VENOUS BLD VENIPUNCTURE: CPT | Mod: PO | Performed by: FAMILY MEDICINE

## 2022-08-30 PROCEDURE — 99395 PR PREVENTIVE VISIT,EST,18-39: ICD-10-PCS | Mod: S$GLB,,, | Performed by: FAMILY MEDICINE

## 2022-08-30 PROCEDURE — 1159F PR MEDICATION LIST DOCUMENTED IN MEDICAL RECORD: ICD-10-PCS | Mod: CPTII,S$GLB,, | Performed by: FAMILY MEDICINE

## 2022-08-30 PROCEDURE — 80053 COMPREHEN METABOLIC PANEL: CPT | Performed by: FAMILY MEDICINE

## 2022-08-30 PROCEDURE — 83036 HEMOGLOBIN GLYCOSYLATED A1C: CPT | Performed by: FAMILY MEDICINE

## 2022-08-30 PROCEDURE — 3074F PR MOST RECENT SYSTOLIC BLOOD PRESSURE < 130 MM HG: ICD-10-PCS | Mod: CPTII,S$GLB,, | Performed by: FAMILY MEDICINE

## 2022-08-30 PROCEDURE — 99395 PREV VISIT EST AGE 18-39: CPT | Mod: S$GLB,,, | Performed by: FAMILY MEDICINE

## 2022-08-30 PROCEDURE — 3044F HG A1C LEVEL LT 7.0%: CPT | Mod: CPTII,S$GLB,, | Performed by: FAMILY MEDICINE

## 2022-08-30 PROCEDURE — 3074F SYST BP LT 130 MM HG: CPT | Mod: CPTII,S$GLB,, | Performed by: FAMILY MEDICINE

## 2022-08-30 PROCEDURE — 99999 PR PBB SHADOW E&M-EST. PATIENT-LVL IV: ICD-10-PCS | Mod: PBBFAC,,, | Performed by: FAMILY MEDICINE

## 2022-08-30 PROCEDURE — 85025 COMPLETE CBC W/AUTO DIFF WBC: CPT | Performed by: FAMILY MEDICINE

## 2022-08-30 PROCEDURE — 99999 PR PBB SHADOW E&M-EST. PATIENT-LVL IV: CPT | Mod: PBBFAC,,, | Performed by: FAMILY MEDICINE

## 2022-08-30 PROCEDURE — 1159F MED LIST DOCD IN RCRD: CPT | Mod: CPTII,S$GLB,, | Performed by: FAMILY MEDICINE

## 2022-08-30 RX ORDER — CLONIDINE HYDROCHLORIDE 0.1 MG/1
1 TABLET, EXTENDED RELEASE ORAL NIGHTLY
COMMUNITY
Start: 2022-08-24

## 2022-08-30 RX ORDER — EPINEPHRINE 0.3 MG/.3ML
INJECTION SUBCUTANEOUS
Qty: 1 EACH | Refills: 3 | Status: SHIPPED | OUTPATIENT
Start: 2022-08-30

## 2022-08-30 NOTE — PROGRESS NOTES
Health Maintenance Due   Topic     Pneumococcal Vaccines (Age 0-64) (1 - PCV)     TETANUS VACCINE

## 2022-09-06 ENCOUNTER — TELEPHONE (OUTPATIENT)
Dept: OTOLARYNGOLOGY | Facility: CLINIC | Age: 32
End: 2022-09-06
Payer: COMMERCIAL

## 2022-09-06 ENCOUNTER — PATIENT MESSAGE (OUTPATIENT)
Dept: OTOLARYNGOLOGY | Facility: CLINIC | Age: 32
End: 2022-09-06
Payer: COMMERCIAL

## 2022-09-06 ENCOUNTER — TELEPHONE (OUTPATIENT)
Dept: BARIATRICS | Facility: CLINIC | Age: 32
End: 2022-09-06
Payer: COMMERCIAL

## 2022-09-09 ENCOUNTER — TELEPHONE (OUTPATIENT)
Dept: BARIATRICS | Facility: CLINIC | Age: 32
End: 2022-09-09
Payer: COMMERCIAL

## 2022-10-18 ENCOUNTER — PATIENT MESSAGE (OUTPATIENT)
Dept: FAMILY MEDICINE | Facility: CLINIC | Age: 32
End: 2022-10-18
Payer: COMMERCIAL

## 2022-10-18 NOTE — PROGRESS NOTES
Subjective:       Patient ID: Larry Madden is a 32 y.o. male.    Chief Complaint: Annual Exam      HPI  31 yo male presents for annual exam.    Review of Systems   Constitutional: Negative.    HENT: Negative.     Respiratory: Negative.     Cardiovascular: Negative.    Gastrointestinal: Negative.    Endocrine: Negative.    Genitourinary: Negative.    Musculoskeletal: Negative.    Neurological: Negative.    Psychiatric/Behavioral: Negative.          Past Medical History:   Diagnosis Date    Anxiety     Asthma      Past Surgical History:   Procedure Laterality Date    APPLICATION OF CARTILAGE GRAFT  4/21/2022    Procedure: APPLICATION, CARTILAGE GRAFT;  Surgeon: Rg Orantes III, MD;  Location: [facility];  Service: ENT;;    EXCISION TURBINATE, SUBMUCOUS      NASAL RECONSTRUCTION N/A 4/21/2022    Procedure: RECONSTRUCTION, NOSE;  Surgeon: Rg Orantes III, MD;  Location: [facility];  Service: ENT;  Laterality: N/A;    NASAL SEPTOPLASTY Bilateral 12/3/2020    Procedure: SEPTOPLASTY, NOSE;  Surgeon: Rg Orantes III, MD;  Location: [facility];  Service: Plastics;  Laterality: Bilateral;    NASAL SEPTOPLASTY N/A 4/21/2022    Procedure: SEPTOPLASTY, NOSE;  Surgeon: Rg Orantes III, MD;  Location: [facility];  Service: ENT;  Laterality: N/A;    NASAL STENOSIS REPAIR Bilateral 12/3/2020    Procedure: REPAIR, STENOSIS, NOSE, VESTIBULE;  Surgeon: Rg Orantes III, MD;  Location: [facility];  Service: Plastics;  Laterality: Bilateral;  Nasal reconstruction with osteotomies and revision / FOLLOW DR ELLISON ANESTHESIA PROTOCAL     23 hour admit GIOVANNA    NASAL TURBINATE REDUCTION  4/21/2022    Procedure: REDUCTION, NASAL TURBINATE;  Surgeon: Rg Orantes III, MD;  Location: [facility];  Service: ENT;;    SHOULDER ARTHROSCOPY W/ LABRAL REPAIR      SINUS SURGERY       Family History   Problem Relation Age of Onset    Depression Mother     Arthritis Father     Cirrhosis Neg Hx      Social History     Socioeconomic History     Marital status: Single   Tobacco Use    Smoking status: Never    Smokeless tobacco: Never   Substance and Sexual Activity    Alcohol use: Yes    Drug use: No    Sexual activity: Yes     Social Determinants of Health     Financial Resource Strain: Low Risk     Difficulty of Paying Living Expenses: Not hard at all   Food Insecurity: No Food Insecurity    Worried About Running Out of Food in the Last Year: Never true    Ran Out of Food in the Last Year: Never true   Transportation Needs: No Transportation Needs    Lack of Transportation (Medical): No    Lack of Transportation (Non-Medical): No   Physical Activity: Insufficiently Active    Days of Exercise per Week: 3 days    Minutes of Exercise per Session: 30 min   Stress: Stress Concern Present    Feeling of Stress : Very much   Social Connections: Unknown    Frequency of Communication with Friends and Family: More than three times a week    Frequency of Social Gatherings with Friends and Family: Once a week    Active Member of Clubs or Organizations: Yes    Attends Club or Organization Meetings: 1 to 4 times per year    Marital Status: Living with partner   Housing Stability: Low Risk     Unable to Pay for Housing in the Last Year: No    Number of Places Lived in the Last Year: 2    Unstable Housing in the Last Year: No       Current Outpatient Medications:     albuterol (PROVENTIL/VENTOLIN HFA) 90 mcg/actuation inhaler, SMARTSI-2 Puff(s) By Mouth Every 4-6 Hours PRN, Disp: , Rfl:     cetirizine (ZYRTEC) 10 MG tablet, Take 10 mg by mouth., Disp: , Rfl:     cloNIDine HCL 0.1 mg Tb12, Take 1 tablet by mouth every evening., Disp: , Rfl:     diazePAM (VALIUM) 5 MG tablet, Take 5 mg by mouth every 12 (twelve) hours as needed for Anxiety., Disp: , Rfl:     finasteride (PROSCAR) 5 mg tablet, TK 1/2 T PO EVERY OTHER DAY, Disp: , Rfl: 12    fluvoxaMINE (LUVOX) 150 mg 24 hr capsule, , Disp: , Rfl:     fluvoxaMINE 100 mg Cp24, , Disp: , Rfl:     hepatitis A and B vaccine,  "PF, (TWINRIX) 720 TRES unit- 20 mcg/mL Syrg suspension, Inject into the muscle., Disp: 1 mL, Rfl: 0    montelukast (SINGULAIR) 10 mg tablet, Take 10 mg by mouth every evening., Disp: , Rfl:     EPINEPHrine (EPIPEN) 0.3 mg/0.3 mL AtIn, Daily prn, Disp: 1 each, Rfl: 3   Objective:      Vitals:    08/30/22 1254   BP: 110/62   BP Location: Left arm   Patient Position: Sitting   BP Method: Small (Manual)   Pulse: 94   Resp: 18   Temp: 98.3 °F (36.8 °C)   TempSrc: Oral   SpO2: 97%   Weight: 94.6 kg (208 lb 8.9 oz)   Height: 5' 10" (1.778 m)       Physical Exam  Constitutional:       General: He is not in acute distress.  HENT:      Head: Normocephalic and atraumatic.   Eyes:      Conjunctiva/sclera: Conjunctivae normal.   Cardiovascular:      Rate and Rhythm: Normal rate and regular rhythm.      Heart sounds: Normal heart sounds. No murmur heard.    No friction rub. No gallop.   Pulmonary:      Effort: Pulmonary effort is normal.      Breath sounds: Normal breath sounds. No wheezing or rales.   Musculoskeletal:      Cervical back: Neck supple.   Skin:     General: Skin is warm and dry.   Neurological:      Mental Status: He is alert and oriented to person, place, and time.   Psychiatric:         Behavior: Behavior normal.         Thought Content: Thought content normal.         Judgment: Judgment normal.          Assessment:       1. Annual physical exam    2. H/O multiple allergies    3. Overweight (BMI 25.0-29.9)          Plan:       Annual physical exam  -     CBC Auto Differential; Future; Expected date: 08/30/2022  -     Comprehensive Metabolic Panel; Future; Expected date: 08/30/2022  -     Hemoglobin A1C; Future; Expected date: 08/30/2022  -     TSH; Future; Expected date: 08/30/2022    H/O multiple allergies  -     EPINEPHrine (EPIPEN) 0.3 mg/0.3 mL AtIn; Daily prn  Dispense: 1 each; Refill: 3    Overweight (BMI 25.0-29.9)  -     Ambulatory referral/consult to Bariatric Medicine; Future; Expected date: " 09/06/2022      F/u labs.        No future appointments.    Patient note was created using [employer].  Any errors in syntax or even information may not have been identified and edited on initial review prior to signing this note.

## 2023-02-02 ENCOUNTER — OFFICE VISIT (OUTPATIENT)
Dept: URGENT CARE | Facility: CLINIC | Age: 33
End: 2023-02-02
Payer: COMMERCIAL

## 2023-02-02 VITALS
SYSTOLIC BLOOD PRESSURE: 132 MMHG | TEMPERATURE: 98 F | HEART RATE: 74 BPM | WEIGHT: 208 LBS | OXYGEN SATURATION: 99 % | RESPIRATION RATE: 20 BRPM | DIASTOLIC BLOOD PRESSURE: 89 MMHG | BODY MASS INDEX: 29.78 KG/M2 | HEIGHT: 70 IN

## 2023-02-02 DIAGNOSIS — Z11.9 SCREENING EXAMINATION FOR UNSPECIFIED INFECTIOUS DISEASE: ICD-10-CM

## 2023-02-02 DIAGNOSIS — J06.9 VIRAL URI WITH COUGH: Primary | ICD-10-CM

## 2023-02-02 LAB
CTP QC/QA: YES
SARS-COV-2 AG RESP QL IA.RAPID: NEGATIVE

## 2023-02-02 PROCEDURE — 87811 SARS CORONAVIRUS 2 ANTIGEN POCT, MANUAL READ: ICD-10-PCS | Mod: QW,S$GLB,,

## 2023-02-02 PROCEDURE — 99213 OFFICE O/P EST LOW 20 MIN: CPT | Mod: S$GLB,,,

## 2023-02-02 PROCEDURE — 99213 PR OFFICE/OUTPT VISIT, EST, LEVL III, 20-29 MIN: ICD-10-PCS | Mod: S$GLB,,,

## 2023-02-02 PROCEDURE — 87811 SARS-COV-2 COVID19 W/OPTIC: CPT | Mod: QW,S$GLB,,

## 2023-02-03 NOTE — PATIENT INSTRUCTIONS
- Rest.    - Drink plenty of fluids.  - Viral upper respiratory infections typically run their course in 10-14 days.      - You can take over-the-counter claritin, zyrtec, allegra, or xyzal as directed. These are antihistamines that can help with runny nose, nasal congestion, sneezing, and helps to dry up post-nasal drip, which usually causes sore throat and cough.    - You can take plain Mucinex (guaifenesin) 1200 mg twice a day to help loosen mucous.     - If you do NOT have high blood pressure, you may use a decongestant form (D)  of this medication (ie. Claritin- D, zyrtec-D, allegra-D) or if you do not take the D form, you can take sudafed (pseudoephedrine) over the counter, which is a decongestant. Do NOT take two decongestant (D) medications at the same time (such as mucinex-D and claritin-D or plain sudafed and claritin D). Dextromethorphan (DM) is a cough suppressant over the counter (ie. mucinex DM, robitussin, delsym; dayquil/nyquil has DM as well.)     - You can use Flonase (fluticasone) nasal spray as directed for sinus congestion and postnasal drip. This is a steroid nasal spray that works locally over time to decrease the inflammation in your nose/sinuses and help with allergic symptoms. This is not an quick- relief spray like afrin, but it works well if used daily.  Discontinue if you develop nose bleed  - Use nasal saline prior to Flonase.  - Use Ocean Spray Nasal Saline 1-3 puffs each nostril every 2-3 hours then blow out onto tissue. This is to irrigate the nasal passage way to clear the sinus openings. Use until sinus problem resolved.    - A Neti Pot with sterile saline can help break up nasal congestion and give relief.      - Warm salt water gargles can help with sore throat     - Warm tea with honey can help with sore throat and cough. Honey is a natural cough suppressant.    - You must understand that you have received an Urgent Care treatment only and that you may be released before all of  your medical problems are known or treated.   - You, the patient, will arrange for follow up care as instructed.   - If your condition worsens or fails to improve we recommend that you receive another evaluation at the ER immediately or contact your PCP to discuss your concerns or return here.   - Follow up with your PCP or specialty clinic as directed in the next 1-2 weeks if not improved or as needed.  You can call [phone] to schedule an appointment with the appropriate provider.    If your symptoms do not improve or worsen, go to the emergency room immediately.

## 2023-02-03 NOTE — PROGRESS NOTES
"Subjective:       Patient ID: Larry Madden is a 32 y.o. male.    Vitals:  height is 5' 10" (1.778 m) and weight is 94.3 kg (208 lb). His temperature is 98.3 °F (36.8 °C). His blood pressure is 132/89 and his pulse is 74. His respiration is 20 and oxygen saturation is 99%.     Chief Complaint: Nasal Congestion (/)    Pt presents with complaint of runny nose, mucous production, sneezing x3 days.  Pt states he has taken mucinex for his symptoms with no relief.  Pt states he has no known exposures to flu or covid. He denies fever, body aches or chills.     Other  This is a new problem. The current episode started in the past 7 days. The problem occurs constantly. The problem has been unchanged. Associated symptoms include congestion and fatigue. Nothing aggravates the symptoms. Treatments tried: mucinex. The treatment provided no relief.     Constitution: Positive for fatigue.   HENT:  Positive for congestion and postnasal drip. Negative for foreign body in nose.    Eyes:  Negative for eye pain and eye redness.   Respiratory:  Positive for sputum production.    Skin:  Negative for hives.   Allergic/Immunologic: Negative for hives and itching.   Neurological:  Negative for disorientation and altered mental status.   Psychiatric/Behavioral:  Negative for altered mental status and disorientation.      Objective:      Physical Exam   Constitutional: He is oriented to person, place, and time. He appears well-developed. He is cooperative.  Non-toxic appearance. He does not appear ill. No distress.      Comments:Patient sits comfortably in exam chair. Answers questions in complete sentences. Does not show any signs of distress or discoloration.        HENT:   Head: Normocephalic and atraumatic.   Ears:   Right Ear: Hearing, tympanic membrane, external ear and ear canal normal.   Left Ear: Hearing, tympanic membrane, external ear and ear canal normal.   Nose: Mucosal edema, rhinorrhea and congestion present. No nasal " deformity. No epistaxis. Right sinus exhibits maxillary sinus tenderness. Right sinus exhibits no frontal sinus tenderness. Left sinus exhibits maxillary sinus tenderness. Left sinus exhibits no frontal sinus tenderness.   Mouth/Throat: Uvula is midline and mucous membranes are normal. No trismus in the jaw. Normal dentition. No uvula swelling. Posterior oropharyngeal erythema present. No oropharyngeal exudate or posterior oropharyngeal edema. No tonsillar exudate.   Eyes: Conjunctivae and lids are normal. No scleral icterus.   Neck: Trachea normal and phonation normal. Neck supple. No edema present. No erythema present. No neck rigidity present.   Cardiovascular: Normal rate, regular rhythm, normal heart sounds and normal pulses.   Pulmonary/Chest: Effort normal. No stridor. No respiratory distress. He has no decreased breath sounds. He has no wheezes. He has no rhonchi. He has no rales.   Abdominal: Normal appearance.   Musculoskeletal: Normal range of motion.         General: No deformity. Normal range of motion.   Lymphadenopathy:     He has no cervical adenopathy.        Right cervical: No superficial cervical, no deep cervical and no posterior cervical adenopathy present.       Left cervical: No superficial cervical, no deep cervical and no posterior cervical adenopathy present.   Neurological: He is alert and oriented to person, place, and time. He exhibits normal muscle tone. Coordination normal.   Skin: Skin is warm, dry, intact, not diaphoretic and not pale.   Psychiatric: His speech is normal and behavior is normal. Judgment and thought content normal.   Nursing note and vitals reviewed.      Results for orders placed or performed in visit on 02/02/23   SARS Coronavirus 2 Antigen, POCT Manual Read   Result Value Ref Range    SARS Coronavirus 2 Antigen Negative Negative     Acceptable Yes        Assessment:       1. Viral URI with cough    2. Screening examination for unspecified infectious  disease          Plan:         Viral URI with cough    Screening examination for unspecified infectious disease  -     SARS Coronavirus 2 Antigen, POCT Manual Read                   Patient Instructions   - Rest.    - Drink plenty of fluids.  - Viral upper respiratory infections typically run their course in 10-14 days.      - You can take over-the-counter claritin, zyrtec, allegra, or xyzal as directed. These are antihistamines that can help with runny nose, nasal congestion, sneezing, and helps to dry up post-nasal drip, which usually causes sore throat and cough.    - You can take plain Mucinex (guaifenesin) 1200 mg twice a day to help loosen mucous.     - If you do NOT have high blood pressure, you may use a decongestant form (D)  of this medication (ie. Claritin- D, zyrtec-D, allegra-D) or if you do not take the D form, you can take sudafed (pseudoephedrine) over the counter, which is a decongestant. Do NOT take two decongestant (D) medications at the same time (such as mucinex-D and claritin-D or plain sudafed and claritin D). Dextromethorphan (DM) is a cough suppressant over the counter (ie. mucinex DM, robitussin, delsym; dayquil/nyquil has DM as well.)     - You can use Flonase (fluticasone) nasal spray as directed for sinus congestion and postnasal drip. This is a steroid nasal spray that works locally over time to decrease the inflammation in your nose/sinuses and help with allergic symptoms. This is not an quick- relief spray like afrin, but it works well if used daily.  Discontinue if you develop nose bleed  - Use nasal saline prior to Flonase.  - Use Ocean Spray Nasal Saline 1-3 puffs each nostril every 2-3 hours then blow out onto tissue. This is to irrigate the nasal passage way to clear the sinus openings. Use until sinus problem resolved.    - A Neti Pot with sterile saline can help break up nasal congestion and give relief.      - Warm salt water gargles can help with sore throat     - Warm tea  with honey can help with sore throat and cough. Honey is a natural cough suppressant.    - You must understand that you have received an Urgent Care treatment only and that you may be released before all of your medical problems are known or treated.   - You, the patient, will arrange for follow up care as instructed.   - If your condition worsens or fails to improve we recommend that you receive another evaluation at the ER immediately or contact your PCP to discuss your concerns or return here.   - Follow up with your PCP or specialty clinic as directed in the next 1-2 weeks if not improved or as needed.  You can call [phone] to schedule an appointment with the appropriate provider.    If your symptoms do not improve or worsen, go to the emergency room immediately.

## 2023-06-20 ENCOUNTER — OFFICE VISIT (OUTPATIENT)
Dept: URGENT CARE | Facility: CLINIC | Age: 33
End: 2023-06-20
Payer: COMMERCIAL

## 2023-06-20 VITALS
HEIGHT: 69 IN | DIASTOLIC BLOOD PRESSURE: 79 MMHG | TEMPERATURE: 99 F | WEIGHT: 205 LBS | BODY MASS INDEX: 30.36 KG/M2 | OXYGEN SATURATION: 96 % | SYSTOLIC BLOOD PRESSURE: 114 MMHG | HEART RATE: 104 BPM

## 2023-06-20 DIAGNOSIS — R52 BODY ACHES: ICD-10-CM

## 2023-06-20 DIAGNOSIS — J06.9 VIRAL URI: Primary | ICD-10-CM

## 2023-06-20 DIAGNOSIS — J02.9 SORE THROAT: ICD-10-CM

## 2023-06-20 LAB
CTP QC/QA: YES
CTP QC/QA: YES
MOLECULAR STREP A: NEGATIVE
SARS-COV-2 AG RESP QL IA.RAPID: NEGATIVE

## 2023-06-20 PROCEDURE — 99213 OFFICE O/P EST LOW 20 MIN: CPT | Mod: S$GLB,,, | Performed by: NURSE PRACTITIONER

## 2023-06-20 PROCEDURE — 87811 SARS-COV-2 COVID19 W/OPTIC: CPT | Mod: QW,S$GLB,, | Performed by: NURSE PRACTITIONER

## 2023-06-20 PROCEDURE — 87651 STREP A DNA AMP PROBE: CPT | Mod: QW,S$GLB,, | Performed by: NURSE PRACTITIONER

## 2023-06-20 PROCEDURE — 87651 POCT STREP A MOLECULAR: ICD-10-PCS | Mod: QW,S$GLB,, | Performed by: NURSE PRACTITIONER

## 2023-06-20 PROCEDURE — 87811 SARS CORONAVIRUS 2 ANTIGEN POCT, MANUAL READ: ICD-10-PCS | Mod: QW,S$GLB,, | Performed by: NURSE PRACTITIONER

## 2023-06-20 PROCEDURE — 99213 PR OFFICE/OUTPT VISIT, EST, LEVL III, 20-29 MIN: ICD-10-PCS | Mod: S$GLB,,, | Performed by: NURSE PRACTITIONER

## 2023-06-20 NOTE — PROGRESS NOTES
"Subjective:      Patient ID: Larry Madden is a 33 y.o. male.    Vitals:  height is 5' 9" (1.753 m) and weight is 93 kg (205 lb). His temperature is 98.7 °F (37.1 °C). His blood pressure is 114/79 and his pulse is 104. His oxygen saturation is 96%.     Chief Complaint: Headache and Sinus Problem    Patient was exposed to covid 2 days ago  Patient has been taking benadryl and advil cold and sinus.   Patient took a at home covid test yesterday , result was negative.  Provider note begins below    Symptoms started 5 days ago.  Patient travels often.  Sore throat, headaches, body aches.    Headache   This is a new problem. The current episode started in the past 7 days. The problem occurs constantly. The problem has been unchanged. The quality of the pain is described as aching. Associated symptoms include dizziness, sinus pressure and a sore throat. Pertinent negatives include no coughing or fever.   Sinus Problem  This is a new problem. The current episode started in the past 7 days. There has been no fever. Associated symptoms include headaches, sinus pressure and a sore throat. Pertinent negatives include no chills or coughing. The treatment provided mild relief.     Constitution: Negative for chills and fever.   HENT:  Positive for sinus pressure and sore throat.    Respiratory:  Negative for cough.    Neurological:  Positive for dizziness and headaches.    Objective:     Physical Exam   Constitutional: He is oriented to person, place, and time.   HENT:   Head: Normocephalic and atraumatic.   Nose: No rhinorrhea or congestion.   Mouth/Throat: No oropharyngeal exudate or posterior oropharyngeal erythema.   Cardiovascular: Tachycardia present.   Pulmonary/Chest: Effort normal. No respiratory distress.   Abdominal: Normal appearance.   Neurological: He is alert and oriented to person, place, and time.   Skin: Skin is dry.   Psychiatric: His behavior is normal. Mood normal.       Results for orders placed or " performed in visit on 06/20/23   SARS Coronavirus 2 Antigen, POCT Manual Read   Result Value Ref Range    SARS Coronavirus 2 Antigen Negative Negative     Acceptable Yes    POCT Strep A, Molecular   Result Value Ref Range    Molecular Strep A, POC Negative Negative     Acceptable Yes        Assessment:     1. Viral URI    2. Body aches    3. Sore throat        Plan:   COVID test negative   Strep test negative  Likely viral   Retest for COVID if symptoms worsen or do not improve.  Hydrate with Gatorade Powerade or Pedialyte  May alternate Tylenol with ibuprofen        Viral URI    Body aches  -     SARS Coronavirus 2 Antigen, POCT Manual Read  -     POCT Strep A, Molecular    Sore throat  -     POCT Strep A, Molecular

## 2023-07-27 ENCOUNTER — OFFICE VISIT (OUTPATIENT)
Dept: SLEEP MEDICINE | Facility: CLINIC | Age: 33
End: 2023-07-27
Payer: COMMERCIAL

## 2023-07-27 DIAGNOSIS — G47.33 OSA (OBSTRUCTIVE SLEEP APNEA): Primary | ICD-10-CM

## 2023-07-27 PROCEDURE — 99213 PR OFFICE/OUTPT VISIT, EST, LEVL III, 20-29 MIN: ICD-10-PCS | Mod: 25,95,, | Performed by: NURSE PRACTITIONER

## 2023-07-27 PROCEDURE — 99213 OFFICE O/P EST LOW 20 MIN: CPT | Mod: 25,95,, | Performed by: NURSE PRACTITIONER

## 2023-07-27 NOTE — PROGRESS NOTES
"The patient location is:[place]  The chief complaint leading to brendon GIOVANNA  Face to Face time with patient: 10minutes of total time spent on the encounter, which includes face to face time and non-face to face time preparing to see the patient (eg, review of tests), Obtaining and/or reviewing separately obtained history, Documenting clinical information in the electronic or other health record, Independently interpreting results (not separately reported) and communicating results to the patient/family/caregiver, or Care coordination (not separately reported). Each patient to whom he or she provides medical services by telemedicine is:  (1) informed of the relationship between the physician and patient and the respective role of any other health care provider with respect to management of the patient; and (2) notified that he or she may decline to receive medical services by telemedicine and may withdraw from such care at any time.      Since last seen he has been using his apap 6-20cm with DS2 macine, just switched to memory foam FFM. ESS=6. No more need for provigil. Wishes could sleep longer, when takes MLT it helps. Machine not able to use touch screen anymore/make setting changes. Has different ins now     Remote 30d 6.3h/n AHI 3.3, 90% tile 8.9-10cm.  He has cutout pillow to avoid mask dislodgement/leaks. Average AHI: 3.0        HX vb 4/30/2020  He has since gotten setup with apap 6-20cm 1/31/20. Used it ~2 wks and has few good consolidated nights but mostly would remove mask and either reapply I the beginning but the last week of use was rough/would rip off after 3-4hr. Feels pressure too high. Was using nose mask and has deviated septum. He then went to [place] (prior to F2F visit) and has been quarantined there due to Covid. He is sleeping on R side but is very tired upon awakening. "It is crippling". He has been prescribed Adderall XR ? 10mg since in [place] (taken few times and it helped focus and he didn't have to drink " "2-3c coffee anymore like he does when he doesn't take it). Interested in alterative treatments for sleep apnea. Has lost 20# since seen!!    Denies cataplexy, sleep hallucinations, vivid dreams or sleep paralysis   "lifelong" excessive daytime sleepiness  Can fall asleep very quickly within 1min  Falls asleep if sitting in dark room/can't go to movies  At 10p he has irrepressible urge to sleep  2-3c coffee qd  Occasional sleep disruption    Encore:  DATE RANGE: 1/31/2020 - 2/18/2020   0% leak  90% tile 7.4cm  Compliance Summary  Days with Device Usage: 13 days  Percentage of Days >=4 Hours: 36.8%  Average Usage (Days Used): 4 hrs. 18 mins. 54 secs.  Average Usage (All Days): 2 hrs. 57 mins. 8 secs.  Apnea Indices  Average AHI: 2.7      12/16/20 vb  Seen today neurology dept for new onset headaches x 4 wks. + increased anxiety, having psychiatry visit tomorrow. They have been daily, located frontally pulsating sensation with poor focus, photophobia and phonophobia, mild blurred vision, rates 4/10 on VAS today, overall mild-moderate. They were more intense after septoplasty 12/3/20 but now back to baseline level. With headaches it is hard to work and affects life, can't watch tv/avoids. OTC tylenol ineffective and zomig 2.5mg lessened pain but not completely aborted. No hx head imaging.     Seeing Dr. Acosta GIOVANNA. Wants requal HST given recent surgery  Sleeping better, feels refreshed upon awakening since surgery, packing removed ~ 2 wk ago,   Last used apap 11/2017 more on weekends longer sleep/consolidated and AHI <5 per Encore review today. Still c/o pressure intolerance  Feels his tongue may still be blocking airway  HAD 2 negative COVID tests w/i past month    TSH, CMP, CBC normal 11/2020 1/2021 vb:  Since last seen 12/16/20 he continues to have daily headaches except relieved effectively with ubrelvy but returns next day. Was taking it daily since visit up until last week stopped and began gabapentin 100mg. " Now up to 200mg qhs. Can't do anything due to his poor sleep. Had negative HST in interim but has ongoing symptoms. Needs in lab for confirmation.     2/10/21 vb: Since last seen he was able to resume using PAP and sleeps overall much better and less sleepy/tired next day. At times removes F30 mask, headstrap is too tight on hishead. Has new nose!, may want alternative mask. ESS=9. Headaches have resolved! Now up to 100mg 3 caps Gabapentin. Nurtec and Ubrelvy both effective abortives.     Encore:  DATE RANGE: 1/13/2021 - 2/9/2021   90% tile 8.7cm  0% leak  Days with Device Usage: 15 days  Percentage of Days >=4 Hours: 42.9%  Average Usage (Days Used): 6 hrs. 8 mins. 39 secs.  Average Usage (All Days): 3 hrs. 17 mins. 29 secs.  Apnea Indices  Average AHI: 3.0  Mood stable    Working/living more [place] now (otherwise [place])  SH:  GF      AHI 8(RDI 23)/low sat 92.4%    HST AHI 1(RDI 6)  PSG 1/28/21: AHI 6.7        ASSESSMENT:    1. GIOVANNA, mild (mod by RDI).Having pressure intolerance, difficulty using PAP due to mask removal. Had recent septoplasty and sleeping reports is better, needs requal HST. 1/12/21 negative HST but having return of poor sleep, feels horrible/can't do anything daytime due to bad sleep. Hx pressure intolerance but that was before had septoplasty and was using nose mask prior to surgery. 1/10/21: Doing well with resuming apap with FFM but headstrap too snug/removes mask or not using, may want alternative mask style/nose 5/5/21: Hypersomnia with GIOVANNA ongoing with effective PAP use, AHI<5. 7/2023: Machine malfunctioning/screen not working, eligible new machine  He has medical co-morbidities of anxiety /OCD which can be worsened by GIOVANNA  2. Headache w/o aura  3. New daily persistent headache--stable    PLAN:  APAP 6-20cm continue until get NEW machine 6-14cm.

## 2023-08-17 ENCOUNTER — OFFICE VISIT (OUTPATIENT)
Dept: SLEEP MEDICINE | Facility: CLINIC | Age: 33
End: 2023-08-17
Payer: COMMERCIAL

## 2023-08-17 DIAGNOSIS — G47.33 OSA (OBSTRUCTIVE SLEEP APNEA): Primary | ICD-10-CM

## 2023-08-17 PROCEDURE — 99499 UNLISTED E&M SERVICE: CPT | Mod: 95,,, | Performed by: NURSE PRACTITIONER

## 2023-08-17 PROCEDURE — 99499 NO LOS: ICD-10-PCS | Mod: 95,,, | Performed by: NURSE PRACTITIONER

## 2023-08-17 NOTE — PROGRESS NOTES
Continues to use qhs apap 6-20cm with DS2 machine,  memory foam FFM. Snoring heard qhs by girlfriend and mask is not dislodged and seal intact.     Remote 30d 6.3h/n AHI 3.3, 90% tile 8.9-10cm.  He has cutout pillow to avoid mask dislodgement/leaks. Average AHI: 3.0      AHI 8(RDI 23)/low sat 92.4%    HST AHI 1(RDI 6)  PSG 1/28/21: AHI 6.7        ASSESSMENT:  1. GIOVANNA, mild (mod by RDI).Having pressure intolerance, difficulty using PAP due to mask removal. Had recent septoplasty and sleeping reports is better, needs requal HST. 1/12/21 negative HST but having return of poor sleep, feels horrible/can't do anything daytime due to bad sleep. Hx pressure intolerance but that was before had septoplasty and was using nose mask prior to surgery. 1/10/21: Doing well with resuming apap with FFM but headstrap too snug/removes mask or not using, may want alternative mask style/nose 5/5/21: Hypersomnia with GIOVANNA ongoing with effective PAP use, AHI<5. 7/2023: Machine malfunctioning/screen not working, eligible new machine  He has medical co-morbidities of anxiety /OCD which can be worsened by GIOVANNA  2. Headache w/o aura  3. New daily persistent headache--stable    PLAN:  APAP 6-20cm continue until get NEW machine 6-14cm.  CPAP titration study to ensure effective pressure/assess mask fit. Snoring reported

## 2023-08-24 ENCOUNTER — TELEPHONE (OUTPATIENT)
Dept: SLEEP MEDICINE | Facility: OTHER | Age: 33
End: 2023-08-24
Payer: COMMERCIAL

## 2023-08-30 ENCOUNTER — PATIENT MESSAGE (OUTPATIENT)
Dept: FAMILY MEDICINE | Facility: CLINIC | Age: 33
End: 2023-08-30
Payer: COMMERCIAL

## 2023-08-30 DIAGNOSIS — Z00.00 ANNUAL PHYSICAL EXAM: ICD-10-CM

## 2023-08-30 DIAGNOSIS — M54.2 NECK PAIN: Primary | ICD-10-CM

## 2023-09-01 ENCOUNTER — OFFICE VISIT (OUTPATIENT)
Dept: OTOLARYNGOLOGY | Facility: CLINIC | Age: 33
End: 2023-09-01
Payer: COMMERCIAL

## 2023-09-01 VITALS
WEIGHT: 213.63 LBS | SYSTOLIC BLOOD PRESSURE: 128 MMHG | HEART RATE: 67 BPM | DIASTOLIC BLOOD PRESSURE: 77 MMHG | BODY MASS INDEX: 31.55 KG/M2

## 2023-09-01 DIAGNOSIS — M95.0 NASAL DEFORMITY, ACQUIRED: Primary | ICD-10-CM

## 2023-09-01 DIAGNOSIS — Z98.890 POST-OPERATIVE STATE: ICD-10-CM

## 2023-09-01 DIAGNOSIS — R04.0 EPISTAXIS: ICD-10-CM

## 2023-09-01 PROCEDURE — 99999 PR PBB SHADOW E&M-EST. PATIENT-LVL III: CPT | Mod: PBBFAC,,, | Performed by: OTOLARYNGOLOGY

## 2023-09-01 PROCEDURE — 99212 OFFICE O/P EST SF 10 MIN: CPT | Mod: S$GLB,,, | Performed by: OTOLARYNGOLOGY

## 2023-09-01 PROCEDURE — 99212 PR OFFICE/OUTPT VISIT, EST, LEVL II, 10-19 MIN: ICD-10-PCS | Mod: S$GLB,,, | Performed by: OTOLARYNGOLOGY

## 2023-09-01 PROCEDURE — 99999 PR PBB SHADOW E&M-EST. PATIENT-LVL III: ICD-10-PCS | Mod: PBBFAC,,, | Performed by: OTOLARYNGOLOGY

## 2023-09-01 NOTE — PROGRESS NOTES
Fifteen months S/P nasal reconstruction with caudal extension graft and bilateral osteotomies,septo,turbs and 1 year since last visit.  He states that he has been doing well overall with good nasal airway especially when exercising but has had some recent nasal bleeding from the right side.  He does admit to regular digital manipulation inside his nose.  Today his exam reveals his dorsum to be straight and well supported with good nasal airway and mild crusting at the anterior aspect of his septum on the right side.  Plan:  Continue with copious nasal saline sprays.  Epistaxis precaution sheet given and I have reviewed all of these recommendations with him in detail.  DO NOT digitally or otherwise manipulate intranasally.  RTC:  Laurie.

## 2023-09-02 ENCOUNTER — HOSPITAL ENCOUNTER (OUTPATIENT)
Dept: SLEEP MEDICINE | Facility: OTHER | Age: 33
Discharge: HOME | End: 2023-09-02
Payer: COMMERCIAL

## 2023-09-02 DIAGNOSIS — G47.33 OSA (OBSTRUCTIVE SLEEP APNEA): ICD-10-CM

## 2023-09-02 PROCEDURE — 95811 POLYSOM 6/>YRS CPAP 4/> PARM: CPT

## 2023-09-03 NOTE — PROGRESS NOTES
Larry Madden to [facility] for an overnight CPAP titration study.     Pt wore his own M Resmed foam FFM.     Post study information given to pt in AM

## 2023-09-06 ENCOUNTER — CLINICAL SUPPORT (OUTPATIENT)
Dept: REHABILITATION | Facility: OTHER | Age: 33
End: 2023-09-06
Payer: COMMERCIAL

## 2023-09-06 DIAGNOSIS — M79.2 RADICULAR PAIN IN LEFT ARM: ICD-10-CM

## 2023-09-06 DIAGNOSIS — R29.3 POSTURE IMBALANCE: ICD-10-CM

## 2023-09-06 DIAGNOSIS — M54.2 NECK PAIN: ICD-10-CM

## 2023-09-06 DIAGNOSIS — M25.811 IMPINGEMENT OF RIGHT SHOULDER: ICD-10-CM

## 2023-09-06 DIAGNOSIS — R29.898 DECREASED STRENGTH OF UPPER EXTREMITY: ICD-10-CM

## 2023-09-06 PROCEDURE — 97112 NEUROMUSCULAR REEDUCATION: CPT | Mod: PN

## 2023-09-06 PROCEDURE — 97162 PT EVAL MOD COMPLEX 30 MIN: CPT | Mod: PN

## 2023-09-06 NOTE — PLAN OF CARE
[facility]   Physical Therapy Initial Evaluation      Name: Larry Gregorio Superior  Clinic Number: [id number]    Therapy Diagnosis:   Encounter Diagnoses   Name Primary?    Neck pain     Impingement of right shoulder     Decreased strength of upper extremity     Radicular pain in left arm     Posture imbalance         Physician: Leif Goode MD    Physician Orders: PT Eval and Treat   Medical Diagnosis from Referral: M54.2 (ICD-10-CM) - Neck pain   Evaluation Date: 9/6/2023  Authorization Period Expiration: 8/31/24  Plan of Care Expiration: 11/30/23  Progress Note Due: 10/6/23  Visit # / Visits authorized: 1/ 1   FOTO: 1/ 3    Precautions: standard     Time In: 07:45 am  Time Out: 08:30 am  Total Billable Time: 45 minutes    Subjective     Date of onset: Several years.    History of current condition - Jg reports he has bad posture and he thinks it's causing neck, shoulder, and upper thoracic pain. He's had therapy before and it benefited him a lot. Denied any headaches, but does report radiating pain into his shoulders and down into the hands (L>R). He has no difficulty with sleeping. He has most difficulty with sitting prolonged times at his desk completing work tasks (especially with typing). Jg does report some pain with lifting his R arm overhead but his pain never gets to extreme levels.      Falls: None    Imaging: None.     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   Prior Level of Function: Independent with all functions and work tasks  Current Level of Function: Independent with all functions, pain with work duties.     Pain:  Current 2/10, worst 4/10, best 2/10   Location: bilateral arms, neck , and shoulder    Description: Burning and Sharp  Aggravating Factors: Sitting  Easing Factors: relaxation, rest     Patients goals: feeling no pain with  daily functions and work tasks.      Medical History:   Past Medical History:   Diagnosis Date    Anxiety     Asthma        Surgical History:   Larry Madden  has a past surgical history that includes Sinus surgery; Excision turbinate, submucous; Shoulder arthroscopy w/ labral repair; Nasal stenosis repair (Bilateral, 12/3/2020); Nasal septoplasty (Bilateral, 12/3/2020); Nasal septoplasty (N/A, 4/21/2022); Nasal reconstruction (N/A, 4/21/2022); Nasal turbinate reduction (4/21/2022); and Application of cartilage graft (4/21/2022).    Medications:   Larry has a current medication list which includes the following prescription(s): albuterol, cetirizine, clonidine hcl, diazepam, epinephrine, finasteride, fluvoxamine, fluvoxamine, and montelukast.    Allergies:   Review of patient's allergies indicates:  No Known Allergies     Objective        Observations: Pt presents with upper crossed posture; head forward with rounded shoulders    Handedness (R: Right, L: Left): R hand     Cervical AROM:   Cervical AROM Approximate degrees (Normal) Comments if applicable    Flexion  55 (50-60)    Extension  60 (60-70) Slight increase in pain noted.   R Rotation  75 (70-90)    L Rotation  70 (70-90) Reports tightness in R UT   R Side bending  50 (20-45)    L Side bending 40 (20-45) Reports tightness in R UT   Quadrant testing (+ or - for s/s):  Right: negative vs Left: positive  Decreased pain with distraction       Upper Extremity Strength (** indicates pain)  (R) UE  (L) UE    Shoulder flexion: 4+/5 Shoulder flexion: 5/5   Shoulder Abduction: 4/5** Shoulder abduction: 4+/5**   Shoulder ER 4/5 Shoulder ER 5/5   Shoulder IR 5/5 Shoulder IR 5/5   Elbow flexion: 5/5 Elbow flexion: 5/5   Elbow extension: 5/5 Elbow extension: 5/5   Wrist flexion: 5/5 Wrist flexion: 5/5   Wrist extension: 5/5 Wrist extension: 5/5   Lower Trap 4/5** Lower Trap 4/5   Upper Trap 4+/5 Upper Trap 4+/5   Rhomboids 4+/5 Rhomboids 4+/5       Shoulder ROM:  "WNL for L and R shoulder  - painful arc on R shoulder around 110-120 degrees (some numbness/tingling)  - mild pain on L shoulder around 110-120 degrees    Shoulder special tests:  + subscap lift off: positive R  + Cao Hira: negative R  + Neer's: positive R  + Empty Can: positive R      Dermatomes: WNL bilaterally     Palpation: Pt presents with tenderness to palpation to C6/C7 SP, T2-T4 SP  - mild tenderness along R LH biceps tendon and in B UT/LT muscles.    Joint Mobility: CPA's: hypomobility of C6/C7, T2-T4     Special testing cluster exams:  - Criteria for cervical radiculopathy:        - Cervical Rotation AROM < 60*, relief with distraction, s/s with Spurlings, + ULTTA  - Met 3/4 criteria for cervical radiculopathy    Cervical myelopathy cluster:   - Gait deviations/coordination deficits, (+) Rivas, (+) Inverted supinator sign, (+) Babinski, Age>44 y/o   - Met 0/5 for cervical myelopathy (1/5 rule out, 3/5 rule in)        Intake Outcome Measure for FOTO Neck Survey    Therapist reviewed FOTO scores for Larry Madden on 9/6/2023.   FOTO report - see Media section or FOTO account episode details.    Intake Score: 63%    Goal Score: 71%         Treatment     Total Treatment time (time-based codes) separate from Evaluation: 15 minutes     Jg received the treatments listed below:        neuromuscular re-education activities to improve: Balance, Coordination, Kinesthetic, Sense, Proprioception, and Posture for 10 minutes. The following activities were included:  + HEP exercises:   Scapular retractions 10 x 3"   Chin Tucks 10 x 3"   UT stretch 2 x 30"   LT stretch 2 x 30"   Rows RTB x 10   Extension RTB x 10       Patient Education and Home Exercises     Education provided:   - Pt educated on current diagnosis, symptom pathology, POC, and benefit of PT.  - Pt educated on purpose, benefit, and side effects of TDN.  - Pt educated on ergonomics for improving posture at work.  - Pt educated on HEP " with handout and demonstration provided.     Written Home Exercises Provided: yes. Exercises were reviewed and Jg was able to demonstrate them prior to the end of the session.  Jg demonstrated good  understanding of the education provided. See EMR under Patient Instructions for exercises provided during therapy sessions.    Assessment     Larry is a 33 y.o. male referred to outpatient Physical Therapy with a medical diagnosis of M54.2 (ICD-10-CM) - Neck pain . Patient presents with chief complaint of neck pain that radiates into both arms (L>R) as well as pain in his R shoulder with overhead movement. Upon evaluation, patient presented with upper crossed posture including rounded shoulders and forward head posture. Patient demonstrated WNL cervical range of motion and strength with tightness noted in B upper trap/SCM muscles during side-bend and rotation. Strength deficits noted in B Ue's with pain increased during resisted abduction bilaterally as well as painful arc in R shoulder from 110-120 degrees. Patient did present with positive Neer's, subscap lift off, and empty can suggesting some impingement in his R shoulder. Jg met 3/4 criteria for radiculopathy testing in his L arm and found relief with cervical distraction. Hypomobility and tenderness noted along C6-7 spinous processes and T2-T4 spinous processes. Education provided regarding potential for TDN to decrease tension in upper cervical muscles with pt voicing understanding for future session. HEP was established including shoulder rows/extension, chin tucks, scapular retractions, and UT/LT stretching with handout and demonstration provided. Patient educated to perform chin tucks and scap retractions at work to improve upright posture and decrease radicular symptoms. Patient would benefit from skilled PT to address his deficits listed above, improving upright posture, mobility with daily chores, and decreased pain with work tasks.        Patient prognosis is Good.   Patient will benefit from skilled outpatient Physical Therapy to address the deficits stated above and in the chart below, provide patient /family education, and to maximize patientt's level of independence.     Plan of care discussed with patient: Yes  Patient's spiritual, cultural and educational needs considered and patient is agreeable to the plan of care and goals as stated below:     Anticipated Barriers for therapy: standard    Medical Necessity is demonstrated by the following  History  Co-morbidities and personal factors that may impact the plan of care [] LOW: no personal factors / co-morbidities  [x] MODERATE: 1-2 personal factors / co-morbidities  [] HIGH: 3+ personal factors / co-morbidities    Moderate / High Support Documentation:   Co-morbidities affecting plan of care: Hx of cubital tunnel    Personal Factors: None     Examination  Body Structures and Functions, activity limitations and participation restrictions that may impact the plan of care [] LOW: addressing 1-2 elements  [] MODERATE: 3+ elements  [x] HIGH: 4+ elements (please support below)    Moderate / High Support Documentation:   - radicular pain in L arm  - impingement pain in R shoulder  - neck pain and tightness in UT  - weakness of B UE  - posture imbalance      Clinical Presentation [] LOW: stable  [x] MODERATE: Evolving  [] HIGH: Unstable     Decision Making/ Complexity Score: moderate       Goals:  Short Term Goals: 4 weeks   Patient will increase strength in B UE to 4+/5 for ease with daily chores, lifting objects, and work duties.   Patient will maintain upright posture with minimal cueing to decrease radicular and impingement symptoms in B UE.  Patient will report sitting for work > 1 hour with pain rated < 4/10 for ease completing his work tasks.  Patient will report decreased numbness/tingling in L UE by > 20% for ease and independence with household chores and work duties.  Patient will  improve FOTO score by > 10% showing improved mobility.     Long Term Goals: 12 weeks   Patient will increase strength in BUE to 5/5 for ease with daily chores, lifting objects, and household duties.  Patient will maintain upright posture with no cueing for decreased radicular symptoms and impingement pain in shoulders.  Patient will report sitting for work > 1 hour with pain rated < 2/10 for ease completing his work assignments.  Patient will report 50% reduction in numbness/tingling in L UE for ease and independence with household tasks and leisure activities.  Patient will be independent with HEP to maintain progress and improve mobility.  Patient will improve FOTO score by > 15% showing improve mobility.       Plan     Plan of care Certification: 9/6/2023 to 11/30/23.    Outpatient Physical Therapy 2 times weekly for 12 weeks to include the following interventions: Cervical/Lumbar Traction, Manual Therapy, Moist Heat/ Ice, Neuromuscular Re-ed, Patient Education, Self Care, Therapeutic Activities, and Therapeutic Exercise and dry needling.    Chastity Healy PT        Physician's Signature: _________________________________________ Date: ________________

## 2023-09-11 ENCOUNTER — CLINICAL SUPPORT (OUTPATIENT)
Dept: REHABILITATION | Facility: OTHER | Age: 33
End: 2023-09-11
Payer: COMMERCIAL

## 2023-09-11 DIAGNOSIS — R29.3 POSTURE IMBALANCE: ICD-10-CM

## 2023-09-11 DIAGNOSIS — M25.811 IMPINGEMENT OF RIGHT SHOULDER: Primary | ICD-10-CM

## 2023-09-11 DIAGNOSIS — M79.2 RADICULAR PAIN IN LEFT ARM: ICD-10-CM

## 2023-09-11 DIAGNOSIS — R29.898 DECREASED STRENGTH OF UPPER EXTREMITY: ICD-10-CM

## 2023-09-11 PROCEDURE — 97110 THERAPEUTIC EXERCISES: CPT | Mod: PN

## 2023-09-11 PROCEDURE — 97112 NEUROMUSCULAR REEDUCATION: CPT | Mod: PN

## 2023-09-11 NOTE — PROGRESS NOTES
"[facility]   Physical Therapy Treatment Note      Name: Larry Gregorio [place]  Clinic Number: [id number]    Therapy Diagnosis:   Encounter Diagnoses   Name Primary?    Impingement of right shoulder Yes    Decreased strength of upper extremity     Radicular pain in left arm     Posture imbalance      Physician: Leif Goode MD    Visit Date: 9/11/2023    Physician Orders: PT Eval and Treat   Medical Diagnosis from Referral: M54.2 (ICD-10-CM) - Neck pain   Evaluation Date: 9/6/2023  Authorization Period Expiration: 8/31/24  Plan of Care Expiration: 11/30/23  Progress Note Due: 10/6/23  Visit # / Visits authorized: 2/20   FOTO: 1/ 3     Precautions: standard      Time In: 10:05 am  Time Out: 10:45 am  Total Billable Time: 40 minutes    Subjective     Patient reports: he's feeling about the same as last time. He did state that he didn't do his exercises at home.   He was not compliant with home exercise program.  Response to previous treatment: no adverse response  Functional change: no change    Pain: 2/10  Location:  bilateral arms, neck , and shoulder       Objective      Objective Measures updated at progress report unless specified.     Treatment     Jg received the treatments listed below:      therapeutic exercises to develop strength, endurance, ROM, flexibility, and posture for 23 minutes including:  + UBE 3' fwd, 3' backward: 6 min total - ROM, increased blood flow, improved posture  + resisted chin tucks RTB 15 x 3"  Shoulder Rows CC 7# each 2 x 10  Shoulder extension CC 7# each RTB 2 x 10      neuromuscular re-education activities to improve: Balance, Coordination, Kinesthetic, Sense, Proprioception, and Posture for 17 minutes. The following activities were included:  + Ulnar nerve floss x 10 B  + prone scap retraction + lift off x 15   + prone T's x 15   prone scapular retractions 10 x 3"    Chin Tucks 10 x 3"    UT stretch 2 x 30"  LT stretch 2 x 30"      Patient " Education and Home Exercises       Education provided:   - Patient educated on therapy rationale.  - Patient educated on compliance with HEP to maintain progress and improve mobility.     Written Home Exercises Provided: Patient instructed to cont prior HEP. Exercises were reviewed and Jg was able to demonstrate them prior to the end of the session. Jg demonstrated good  understanding of the education provided. See Electronic Medical Record under Patient Instructions for exercises provided during therapy sessions    Assessment     Patient demonstrated good tolerance to therapy visit. Added exercises targeting scapular strength and endurance to promote upright posture with occasional cueing to ensure correct form with completion. Patient demonstrated good activation of muscles with prone scapular exercises and voiced decreased radicular pain with prone T's. Performed Ulnar nerve flossing bilaterally w/ pt showing good tolerance. Educated to perform flossing at home to help decrease radicular symptoms into ring/pinky fingers. Patient had no increased pain or radicular symptoms at end of visit. Plan to progress exercises each visit as tolerated. Consider DN next visit as pt voiced interest.     Jg Is progressing well towards his goals.   Patient prognosis is Good.     Patient will continue to benefit from skilled outpatient physical therapy to address the deficits listed in the problem list box on initial evaluation, provide pt/family education and to maximize pt's level of independence in the home and community environment.     Patient's spiritual, cultural and educational needs considered and pt agreeable to plan of care and goals.     Anticipated barriers to physical therapy: standard    Goals: updated  Short Term Goals: 4 weeks   Patient will increase strength in B UE to 4+/5 for ease with daily chores, lifting objects, and work duties. (Progressing not met)  Patient will maintain upright posture  with minimal cueing to decrease radicular and impingement symptoms in B UE. (Progressing not met)  Patient will report sitting for work > 1 hour with pain rated < 4/10 for ease completing his work tasks. (Progressing not met)  Patient will report decreased numbness/tingling in L UE by > 20% for ease and independence with household chores and work duties. (Progressing not met)  Patient will improve FOTO score by > 10% showing improved mobility. (Progressing not met)     Long Term Goals: 12 weeks   Patient will increase strength in BUE to 5/5 for ease with daily chores, lifting objects, and household duties. (Progressing not met)  Patient will maintain upright posture with no cueing for decreased radicular symptoms and impingement pain in shoulders. (Progressing not met)  Patient will report sitting for work > 1 hour with pain rated < 2/10 for ease completing his work assignments. (Progressing not met)  Patient will report 50% reduction in numbness/tingling in L UE for ease and independence with household tasks and leisure activities. (Progressing not met)  Patient will be independent with HEP to maintain progress and improve mobility. (Progressing not met)  Patient will improve FOTO score by > 15% showing improve mobility.  (Progressing not met)       Plan     Plan of care Certification: 9/6/2023 to 11/30/23.     Continue current POC working on scapular strengthening exercises and improving upright posture. Consider TDN next visit to relief tension through cervical spine and radicular symptoms in B arms.     Chastity Healy, PT

## 2023-09-12 ENCOUNTER — PATIENT MESSAGE (OUTPATIENT)
Dept: SLEEP MEDICINE | Facility: CLINIC | Age: 33
End: 2023-09-12

## 2023-09-12 PROCEDURE — 95811 PR POLYSOMNOGRAPHY W/CPAP: ICD-10-PCS | Mod: 26,,, | Performed by: PSYCHIATRY & NEUROLOGY

## 2023-09-12 PROCEDURE — 95811 POLYSOM 6/>YRS CPAP 4/> PARM: CPT | Mod: 26,,, | Performed by: PSYCHIATRY & NEUROLOGY

## 2023-09-12 NOTE — PROCEDURES
"    Titration PSG Report  [facility]  [address], [place] [address]  Phone: [phone]  Fax: [phone]     Patient Name: SIDNEY SORENSEN Study Date: 9/2/2023   YOB: 1990 Patient MRN: [id number]   Age:  33 year     Sex: Male Referring Physician: MIGUEL Duque NP   Height: 5' 9" Recording Tech: Bigg Mullena RPSGT   Weight: 205.0 lbs Scoring Tech: Riley Headley RRT RPSGT   BMI:  30.4 AASM  1A   AHI: 6.5 Interpreting Physician Tamara Acosta MD   RERA index: - Low oxygen saturation: 88.0%   RDI: 6.5 Location [facility]             Polysomnogram Data: A full night polysomnogram recorded the standard physiologic parameters including EEG, EOG, EMG, EKG, nasal and oral airflow.  Respiratory parameters of chest and abdominal movements were recorded with Peizo-Crystal motion transducers.  Oxygen saturation was recorded by pulse oximetry.    Sleep architecture: This is a CPAP titration study. At light's out, the patient fell asleep in 60.0 minutes. Sleep efficiency was 70.3%. Total sleep time (TST) was 324.5 minutes. REM latency was 110.5 minutes.    Limb Movement Activity: There were 183 limb movements recorded.  Of this total, 183 were classified as PLMs.  Of the PLMs, 16 were associated with arousals.  The Limb Movement index was 33.8 per hour while the PLM index was 33.8 per hour and PLM with arousals index was 3.0 per hour.    Cardiac: Cardiac monitoring revealed a sinus rthythm with occasional PVCs.    The average pulse rate was 52.2 bpm.  The minimum pulse rate was 42.0 bpm while the maximum pulse rate was 98.0 bpm.    Respiratory Summary: The polysomnogram revealed a presence of 1 obstructive, 3 central, and - mixed apneas resulting in Total Apnea index of 0.7 events per hour.  There were 31 hypopneas resulting in Total Hypopnea index of 5.7 events per hour.  The combined Apnea/Hypopnea index was 6.5 events per hour.  There were a total of - RERA events resulting in a " Respiratory Disturbance Index (RDI) of 6.5 events per hour.. Mean oxygen saturation was 94.7%.  The lowest oxygen saturation during sleep was 88.0%.  Time spent ?88% oxygen saturation was - minutes (-).      CPAP  titration:, CPAP  (continuous positive airway pressure) was explored from 5 cm/H20 up to 10 cm of water using his own Resmed  Foam Full face mask mask, chin strap, CFlex at 3, and heated humidification. Initial improvement was observed on 7 cm H2O controlling respiratory events in lateral REM sleep.   Best control of sleep disordered breathing  was seen during supine REM sleep on 10 cm H2O. Final AHI at this pressure was below 5/hour.    Snoring was not fully eliminated at the higest tested pressure of 10 cm H2O. Higher pressrues were not attempted.     Patient perception: Following the study, the patient indicated regarding CPAP, Had difficulty falling asleep .    IMPRESSION: GIOVANNA (G47.33). Best control of sleep disordered breathing  was seen during supine REM sleep on 10 cm H2O. Final AHI at this pressure was below 5/hour.    Snoring was not fully eliminated at the higest tested pressure of 10 cm H2O in supine sleep. Higher pressrues were not attempted.     RECOMMENDATION:   CPAP  at 7-14 cm, mask of patient's choice, chin strap, and heated humidification, which is essential in conjunction with PAP to prevent airway drying and irritation.  2.   Consider taking measures to improve nasal patency and avoid mouth breathing to eliminate residual snoring; otherwise, if the snoring continued, consider progressively increasing IPAP min, or consider trial of straight CPAP 11 cm H2O.           I have reviewed the attached data report and the raw data tracings of this study epoch-by-epoch and have determined that the recording quality and scoring of events are sufficient to allow for interpretation and electronically signed by:      Tamara Acosta MD 9/2/2023                              [facility]  "Sleep Lab    Titration Report    Patient Name: SIDNEY SORENSEN Study Date: 9/2/2023   YOB: 1990 MRN #: [id number]   Age: 33 year ROB #: [id number]   Sex: Male Referring Provider: MIGUEL Duque NP   Height: 5' 9" Recording Tech: Bigg Hernandez RPSGT   Weight: 205.0 lbs Scoring Tech: Riley Headley RRT RPSGT   BMI: 30.4 Interpreting Physician: Tamara Acosta MD   ESS: - Neck Circumference: -     Study Overview    Lights Off: 09:04:23 PM  Count Index   Lights On: 04:46:06 AM Awakenings: 13 2.4   Time in Bed: 461.7 min. Arousals: 77 14.2   Total Sleep Time: 324.5 min. Apneas & Hypopneas: 35 6.5    Sleep Efficiency: 70.3% Limb Movements: 183 33.8   Sleep Latency: 60.0 min. Snores: - -   Wake After Sleep Onset: 77.0 min. Desaturations: 23 4.3   REM Latency from Sleep Onset: 110.5 min. Minimum SpO2 TST: 88.0%     Sleep Architecture   % of Time in Bed  Stages Time (mins) % Sleep Time   Wake 137.5    Stage N1 41.0 12.6%   Stage N2 166.0 51.2%   Stage N3 49.5 15.3%   REM 68.0 21.0%         Arousal Summary     NREM REM Sleep Index   Respiratory Arousals 9 - 9 1.7   PLM Arousals 16 - 16 3.0   Isolated Limb Movement Arousals - - - -   Spontaneous Arousals 51 1 52 9.6   Total 76 1 77 14.2       Limb Movement Summary     Count Index   Isolated Limb Movements - -   Periodic Limb Movements (PLMs) 183 33.8   Total Limb Movements 183 33.8   Respiratory Summary     By Sleep Stage By Body Position Total    NREM REM Supine Non-Supine    Time (min) 256.5 68.0 202.5 122.0 324.5           Obstructive Apnea 1 - 1 - 1   Mixed Apnea - - - - -   Central Apnea 2 1 2 1 3   Total Apneas 3 1 3 1 4   Total Apnea Index 0.7 0.9 0.9 0.5 0.7           Total Hypopnea 31 - 28 3 31   Total Hypopnea Index 7.3 - 8.3 1.5 5.7           Apnea & Hypopnea 34 1 31 4 35   Apnea & Hypopnea Index 8.0 0.9 9.2 2.0 6.5           RERAs - - - - -   RERA Index - - - - -           RDI 8.0 0.9 9.2 2.0 6.5     Scoring Criteria: Hypopneas scored at Choose an item.% " desaturation criteria.    Respiratory Event Durations     Apnea Hypopnea    NREM REM NREM REM   Average (seconds) 11.3 10.0 13.2 -   Maximum (seconds) 12.0 10.0 19.3 -       Oxygen Saturation Summary     Wake NREM REM TST Total   Average SpO2 95.6% 94.2% 94.8% 94.3% 94.7%   Minimum SpO2 92.0% 88.0% 93.0% 88.0% 88.0%   Maximum SpO2 98.0% 97.0% 96.0% 97.0% 98.0%     Oxygen Saturation Distribution    Range (%) Time in range (min) Time in range (%)    90.0 - 100.0 461.1 99.9%   80.0 - 90.0 0.4 0.1%   70.0 - 80.0 - -   60.0 - 70.0 - -   50.0 - 60.0 - -   0.0 - 50.0 - -   Time Spent ?88% SpO2    Range (%) Time in range (min) Time in range (%)   0.0 - 88.0 0.1 0.0%          Count Index   Desaturations 23 4.3     Cardiac Summary     Wake NREM REM Sleep Total   Average Pulse Rate (BPM) 56.6 50.3 50.3 50.3 52.2   Minimum Pulse Rate (BPM) 45.0 43.0 42.0 42.0 42.0   Maximum Pulse Rate (BPM) 98.0 94.0 67.0 94.0 98.0     Pulse Rate Distribution    Range (bpm) Time in range (min) Time in range (%)   0.0 - 40.0 - -   40.0 - 60.0 429.3 92.9%   60.0 - 80.0 30.9 6.7%   80.0 - 100.0 1.8 0.4%   100.0 - 120.0 - -   120.0 - 140.0 - -   140.0 - 200.0 - -     EtCO2 Summary    Stage Min (mmHg) Average (mmHg) Max (mmHg)   Wake - - -   NREM(1+2+3) - - -   REM - - -     Range (mmHg) Time in range (min) Time in range (%)   - - -   - - -   - - -   - - -   - - -     TcCO2 Summary    Stage Min (mmHg) Average (mmHg) Max (mmHg)   Wake - - -   NREM(1+2+3) - - -   REM - - -     Range (mmHg) Time in range (min) Time in range (%)   20.0 - 40.0 - -   40.0 - 50.0 - -   50.0 - 55.0 - -   55.0 - 100.0 - -   Excluded data <20.0 & >65.0 462.0 100.0%     Comments    -    Titration Summary    PAP Device PAP Level O2 Level Time (min) TST (min) NREM (min) REM (min) Wake (min) Sleep Eff% OA# CA# MA# Hyp# AHI RERA RDI Min SpO2 SpO2 ?88% (min) Ar. Index   - Off - 0.5 0.0 0.0 0.0 0.5 0.0%             CPAP 5 - 81.0 17.0 17.0 0.0 64.0 21.0% - - - 3 10.6 - 10.6 92.0  0.0  21.2   CPAP 6 - 12.0 11.0 11.0 0.0 1.0 91.7% - - - 5 27.3 - 27.3 88.0  0.0 27.3   CPAP 7 - 99.0 98.0 76.0 22.0 1.0 99.0% 1 - - 14 9.2 - 9.2 88.0  0.0 15.9   CPAP 8 - 15.0 15.0 0.0 15.0 0.0 100.0% - 1 - - 4.0 - 4.0 93.0  0.0 -   CPAP 9 - 89.0 85.5 69.0 16.5 3.5 96.1% - - - 3 2.1 - 2.1 93.0  0.0 14.7   CPAP 10 - 165.5 98.0 83.5 14.5 67.5 59.2% - 2 - 6 4.9 - 4.9 92.0  0.0 11.6

## 2023-09-13 ENCOUNTER — TELEPHONE (OUTPATIENT)
Dept: SLEEP MEDICINE | Facility: CLINIC | Age: 33
End: 2023-09-13
Payer: COMMERCIAL

## 2023-09-13 ENCOUNTER — OFFICE VISIT (OUTPATIENT)
Dept: DERMATOLOGY | Facility: CLINIC | Age: 33
End: 2023-09-13
Payer: COMMERCIAL

## 2023-09-13 DIAGNOSIS — L81.4 LENTIGO: ICD-10-CM

## 2023-09-13 DIAGNOSIS — L82.1 SK (SEBORRHEIC KERATOSIS): ICD-10-CM

## 2023-09-13 DIAGNOSIS — D48.5 NEOPLASM OF UNCERTAIN BEHAVIOR OF SKIN: Primary | ICD-10-CM

## 2023-09-13 DIAGNOSIS — D22.9 NEVUS: ICD-10-CM

## 2023-09-13 DIAGNOSIS — D18.01 CHERRY ANGIOMA: ICD-10-CM

## 2023-09-13 PROCEDURE — 99999 PR PBB SHADOW E&M-EST. PATIENT-LVL III: ICD-10-PCS | Mod: PBBFAC,,, | Performed by: DERMATOLOGY

## 2023-09-13 PROCEDURE — 88305 TISSUE EXAM BY PATHOLOGIST: CPT | Mod: 26,,, | Performed by: PATHOLOGY

## 2023-09-13 PROCEDURE — 99213 OFFICE O/P EST LOW 20 MIN: CPT | Mod: 25,S$GLB,, | Performed by: DERMATOLOGY

## 2023-09-13 PROCEDURE — 11102 PR TANGENTIAL BIOPSY, SKIN, SINGLE LESION: ICD-10-PCS | Mod: S$GLB,,, | Performed by: DERMATOLOGY

## 2023-09-13 PROCEDURE — 88305 TISSUE EXAM BY PATHOLOGIST: CPT | Performed by: PATHOLOGY

## 2023-09-13 PROCEDURE — 11102 TANGNTL BX SKIN SINGLE LES: CPT | Mod: S$GLB,,, | Performed by: DERMATOLOGY

## 2023-09-13 PROCEDURE — 99213 PR OFFICE/OUTPT VISIT, EST, LEVL III, 20-29 MIN: ICD-10-PCS | Mod: 25,S$GLB,, | Performed by: DERMATOLOGY

## 2023-09-13 PROCEDURE — 99999 PR PBB SHADOW E&M-EST. PATIENT-LVL III: CPT | Mod: PBBFAC,,, | Performed by: DERMATOLOGY

## 2023-09-13 PROCEDURE — 88305 TISSUE EXAM BY PATHOLOGIST: ICD-10-PCS | Mod: 26,,, | Performed by: PATHOLOGY

## 2023-09-13 NOTE — TELEPHONE ENCOUNTER
Discussed findings recent tiration maxwell. Remotely adjusted apap to cpap 11cm to help snoring. GIOVANNA controlled at lower pressures See how he does.

## 2023-09-13 NOTE — PROGRESS NOTES
Subjective:      Patient ID:  Larry Madden is a 33 y.o. male who presents for   Chief Complaint   Patient presents with    Skin Check     tbse    Mole     Chest      Pt here today for TBSE    Patient is here today for a skin check.   Pt has a history of  severe sun exposure in the past.   Pt recalls several blistering sunburns in the past- no  Pt has history of tanning bed use- no  Pt has  had moles removed in the past- yes, benign per pt  Pt has history of melanoma in first degree relatives-  no    Patient with new complaint of mole(s)  Location: chest  Duration: 15yrs  Symptoms: bleeds  Relieving factors/Previous treatments: none          Mole      Review of Systems   Skin:  Positive for activity-related sunscreen use. Negative for daily sunscreen use and tendency to form keloidal scars.   Hematologic/Lymphatic: Does not bruise/bleed easily.       Objective:   Physical Exam   Constitutional: He appears well-developed and well-nourished. No distress.   Neurological: He is alert and oriented to person, place, and time. He is not disoriented.   Psychiatric: He has a normal mood and affect.   Skin:   Areas Examined (abnormalities noted in diagram):   Scalp / Hair Palpated and Inspected  Head / Face Inspection Performed  Neck Inspection Performed  Chest / Axilla Inspection Performed  Abdomen Inspection Performed  Genitals / Buttocks / Groin Inspection Performed  Back Inspection Performed  RUE Inspected  LUE Inspection Performed  RLE Inspected  LLE Inspection Performed  Nails and Digits Inspection Performed                           Diagram Legend     Erythematous scaling macule/papule c/w actinic keratosis       Vascular papule c/w angioma      Pigmented verrucoid papule/plaque c/w seborrheic keratosis      Yellow umbilicated papule c/w sebaceous hyperplasia      Irregularly shaped tan macule c/w lentigo     1-2 mm smooth white papules consistent with Milia      Movable subcutaneous cyst with punctum c/w  epidermal inclusion cyst      Subcutaneous movable cyst c/w pilar cyst      Firm pink to brown papule c/w dermatofibroma      Pedunculated fleshy papule(s) c/w skin tag(s)      Evenly pigmented macule c/w junctional nevus     Mildly variegated pigmented, slightly irregular-bordered macule c/w mildly atypical nevus      Flesh colored to evenly pigmented papule c/w intradermal nevus       Pink pearly papule/plaque c/w basal cell carcinoma      Erythematous hyperkeratotic cursted plaque c/w SCC      Surgical scar with no sign of skin cancer recurrence      Open and closed comedones      Inflammatory papules and pustules      Verrucoid papule consistent consistent with wart     Erythematous eczematous patches and plaques     Dystrophic onycholytic nail with subungual debris c/w onychomycosis     Umbilicated papule    Erythematous-base heme-crusted tan verrucoid plaque consistent with inflamed seborrheic keratosis     Erythematous Silvery Scaling Plaque c/w Psoriasis     See annotation      Assessment / Plan:      Pathology Orders:       Normal Orders This Visit    Specimen to Pathology, Dermatology     Questions:    Procedure Type: Dermatology and skin neoplasms    Number of Specimens: 1    ------------------------: -------------------------    Spec 1 Procedure: Biopsy    Spec 1 Clinical Impression: r/o inflamed nevus    Spec 1 Source: mid chest    Release to patient:           Neoplasm of uncertain behavior of skin  Shave biopsy procedure note:    Shave biopsy performed after verbal consent including risk of infection, scar, recurrence, need for additional treatment of site. Area prepped with alcohol, anesthetized with approximately 1.0cc of 1% lidocaine with epinephrine. Lesional tissue shaved with razor blade. Hemostasis achieved with application of aluminum chloride followed by hyfrecation. No complications. Dressing applied. Wound care explained.  Discussed risk of scar, recurrence and pt elected to proceed  -      Specimen to Pathology, Dermatology    Cherry angioma  These are benign vascular lesions that are inherited.  Treatment is not necessary.    Nevus  Discussed ABCDE's of nevi.  Monitor for new mole or moles that are becoming bigger, darker, irritated, or developing irregular borders. Brochure provided. Instructed patient to observe lesion(s) for changes and follow up in clinic if changes are noted. Patient to monitor skin at home for new or changing lesions.     Lentigo  This is a benign hyperpigmented sun induced lesion. Recommend daily sun protection/avoidance and use of at least SPF 30, broad spectrum sunscreen (OTC drug) will reduce the number of new lesions. Treatment of these benign lesions are considered cosmetic.  The nature of sun-induced photo-aging and skin cancers is discussed.  Sun avoidance, protective clothing, and the use of 30-SPF sunscreens is advised. Observe closely for skin damage/changes, and call if such occurs.    SK (seborrheic keratosis)  These are benign inherited growths without a malignant potential. Reassurance given to patient. No treatment is necessary.     Total body skin examination performed today including at least 12 points as noted in physical examination. No lesions suspicious for malignancy noted.    Recommend daily sun protection/avoidance, use of at least SPF 30, broad spectrum sunscreen (OTC drug), skin self examinations, and routine physician surveillance to optimize early detection         Follow up in about 2 years (around 9/13/2025) for prn bx report.

## 2023-09-13 NOTE — PATIENT INSTRUCTIONS
We would like to see you back in the clinic in 24 months.  You will be able to schedule this appointment by calling or by using your [facility] portal 3 months before this time. Because our schedule fills so quickly, please set a reminder in your phone or on your calendar to schedule 3 months before you are due to come in so that we can see you in a timely fashion.  You should also receive a reminder from us in the mail. This will help us ensure we can continue to provide excellent healthcare for you. Thank you.

## 2023-09-14 ENCOUNTER — DOCUMENTATION ONLY (OUTPATIENT)
Dept: REHABILITATION | Facility: OTHER | Age: 33
End: 2023-09-14
Payer: COMMERCIAL

## 2023-09-14 NOTE — PROGRESS NOTES
[facility]  Physical Therapy Discharge Note    Name: Larry Madden  Clinic Number: [id number]    Therapy Diagnosis:        Encounter Diagnoses   Name Primary?    Impingement of right shoulder Yes    Decreased strength of upper extremity      Radicular pain in left arm      Posture imbalance        Physician Orders: PT Eval and Treat   Medical Diagnosis from Referral: M54.2 (ICD-10-CM) - Neck pain   Evaluation Date: 9/6/2023  Authorization Period Expiration: 8/31/24  Plan of Care Expiration: 11/30/23      Date of Last visit: 9/11/23  Total Visits Received: 2    ASSESSMENT      Spoke with patient via phone call on 9/14/23 @ 10:00 am to discuss reason pt cancelling remaining visits. Patient stated his work is having him travel for the next several months and he won't be able to make it into therapy. Educated pt to continue with HEP given to him on initial evaluation as well as continuing to work on correcting upright posture. Told patient if he had any questions to feel free to call back and we'd be glad to talk with him. Also educated patient that if symptoms don't change / get worse over the next several months to come back to therapy and we can open up a new plan of care if needed. Patient appreciated phone call and verbalized understanding.     Discharge reason: Patient requested discharge as his work is requiring him to travel for the next 4 months.    Goals: not met as pt only had 2 visits.    Short Term Goals: 4 weeks   Patient will increase strength in B UE to 4+/5 for ease with daily chores, lifting objects, and work duties. (Progressing not met)  Patient will maintain upright posture with minimal cueing to decrease radicular and impingement symptoms in B UE. (Progressing not met)  Patient will report sitting for work > 1 hour with pain rated < 4/10 for ease completing his work tasks. (Progressing not met)  Patient will report decreased numbness/tingling in L UE by > 20% for  ease and independence with household chores and work duties. (Progressing not met)  Patient will improve FOTO score by > 10% showing improved mobility. (Progressing not met)     Long Term Goals: 12 weeks   Patient will increase strength in BUE to 5/5 for ease with daily chores, lifting objects, and household duties. (Progressing not met)  Patient will maintain upright posture with no cueing for decreased radicular symptoms and impingement pain in shoulders. (Progressing not met)  Patient will report sitting for work > 1 hour with pain rated < 2/10 for ease completing his work assignments. (Progressing not met)  Patient will report 50% reduction in numbness/tingling in L UE for ease and independence with household tasks and leisure activities. (Progressing not met)  Patient will be independent with HEP to maintain progress and improve mobility. (Progressing not met)  Patient will improve FOTO score by > 15% showing improve mobility.  (Progressing not met)    PLAN   This patient is discharged from Physical Therapy      Chastity Healy, PT

## 2023-09-20 LAB
FINAL PATHOLOGIC DIAGNOSIS: NORMAL
Lab: NORMAL

## 2023-09-20 NOTE — PROGRESS NOTES
Jg, your pathology report indicates a benign, non cancerous lesion. No further treatment is needed at this time. Please schedule a follow up appointment in 2 years. You are able to schedule this 3 months ahead of when you are due to come in. Thank you for allowing me to care for you and take care, Dr. Daniels    SKIN, MID CHEST, SHAVE BIOPSY:   - Intradermal melanocytic nevus, transected at the biopsy base.     ROSA HORNE M.D.

## 2023-10-18 ENCOUNTER — PATIENT MESSAGE (OUTPATIENT)
Dept: SLEEP MEDICINE | Facility: CLINIC | Age: 33
End: 2023-10-18
Payer: COMMERCIAL

## 2023-10-31 ENCOUNTER — PATIENT MESSAGE (OUTPATIENT)
Dept: SLEEP MEDICINE | Facility: CLINIC | Age: 33
End: 2023-10-31
Payer: COMMERCIAL

## 2023-10-31 DIAGNOSIS — G47.33 OSA (OBSTRUCTIVE SLEEP APNEA): Primary | ICD-10-CM

## 2023-11-08 NOTE — TELEPHONE ENCOUNTER
Scheduled home sleep study on Jan 4th.   Wartpeel Pregnancy And Lactation Text: This medication is Pregnancy Category X and contraindicated in pregnancy and in women who may become pregnant. It is unknown if this medication is excreted in breast milk.

## 2024-02-05 ENCOUNTER — OFFICE VISIT (OUTPATIENT)
Dept: ORTHOPEDICS | Facility: CLINIC | Age: 34
End: 2024-02-05
Attending: ORTHOPAEDIC SURGERY
Payer: COMMERCIAL

## 2024-02-05 VITALS — HEIGHT: 69 IN | WEIGHT: 213 LBS | BODY MASS INDEX: 31.55 KG/M2

## 2024-02-05 DIAGNOSIS — M25.562 LEFT KNEE PAIN, UNSPECIFIED CHRONICITY: Primary | ICD-10-CM

## 2024-02-05 DIAGNOSIS — S80.02XA CONTUSION OF LEFT KNEE, INITIAL ENCOUNTER: Primary | ICD-10-CM

## 2024-02-05 PROCEDURE — 99999 PR PBB SHADOW E&M-EST. PATIENT-LVL III: CPT | Mod: PBBFAC,,, | Performed by: ORTHOPAEDIC SURGERY

## 2024-02-05 PROCEDURE — 99203 OFFICE O/P NEW LOW 30 MIN: CPT | Mod: S$GLB,,, | Performed by: ORTHOPAEDIC SURGERY

## 2024-02-05 RX ORDER — METHYLPREDNISOLONE 4 MG/1
TABLET ORAL
Qty: 1 EACH | Refills: 0 | Status: SHIPPED | OUTPATIENT
Start: 2024-02-05 | End: 2024-03-05
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
## 2024-03-05 ENCOUNTER — OFFICE VISIT (OUTPATIENT)
Dept: INTERNAL MEDICINE | Facility: CLINIC | Age: 34
End: 2024-03-05
Payer: COMMERCIAL

## 2024-03-05 VITALS
DIASTOLIC BLOOD PRESSURE: 82 MMHG | HEART RATE: 86 BPM | BODY MASS INDEX: 31.64 KG/M2 | WEIGHT: 214.31 LBS | OXYGEN SATURATION: 98 % | SYSTOLIC BLOOD PRESSURE: 120 MMHG

## 2024-03-05 DIAGNOSIS — J45.20 ASTHMA, INTERMITTENT, UNCOMPLICATED: ICD-10-CM

## 2024-03-05 DIAGNOSIS — F41.9 ANXIETY AND DEPRESSION: ICD-10-CM

## 2024-03-05 DIAGNOSIS — Z00.00 ROUTINE GENERAL MEDICAL EXAMINATION AT A HEALTH CARE FACILITY: ICD-10-CM

## 2024-03-05 DIAGNOSIS — G47.33 OSA (OBSTRUCTIVE SLEEP APNEA): ICD-10-CM

## 2024-03-05 DIAGNOSIS — Z00.00 ANNUAL PHYSICAL EXAM: Primary | ICD-10-CM

## 2024-03-05 DIAGNOSIS — F32.A ANXIETY AND DEPRESSION: ICD-10-CM

## 2024-03-05 DIAGNOSIS — Z76.89 ESTABLISHING CARE WITH NEW DOCTOR, ENCOUNTER FOR: ICD-10-CM

## 2024-03-05 PROCEDURE — 99999 PR PBB SHADOW E&M-EST. PATIENT-LVL III: CPT | Mod: PBBFAC,,, | Performed by: STUDENT IN AN ORGANIZED HEALTH CARE EDUCATION/TRAINING PROGRAM

## 2024-03-05 PROCEDURE — 99395 PREV VISIT EST AGE 18-39: CPT | Mod: S$GLB,,, | Performed by: STUDENT IN AN ORGANIZED HEALTH CARE EDUCATION/TRAINING PROGRAM

## 2024-03-05 RX ORDER — BUDESONIDE AND FORMOTEROL FUMARATE DIHYDRATE 80; 4.5 UG/1; UG/1
2 AEROSOL RESPIRATORY (INHALATION) 2 TIMES DAILY
COMMUNITY
Start: 2024-02-19

## 2024-03-05 RX ORDER — RUXOLITINIB 15 MG/G
1 CREAM TOPICAL
COMMUNITY
Start: 2023-10-02 | End: 2024-09-26

## 2024-03-05 RX ORDER — NALTREXONE HYDROCHLORIDE 50 MG/1
50 TABLET, FILM COATED ORAL DAILY
COMMUNITY
Start: 2024-02-18

## 2024-03-05 RX ORDER — LAMOTRIGINE 25 MG/1
25 TABLET ORAL DAILY
COMMUNITY
Start: 2024-02-14

## 2024-03-05 RX ORDER — GEMFIBROZIL 600 MG/1
600 TABLET, FILM COATED ORAL NIGHTLY
COMMUNITY
Start: 2024-02-14

## 2024-03-05 RX ORDER — LACTULOSE 10 G/15ML
SOLUTION ORAL; RECTAL
COMMUNITY
Start: 2024-01-15

## 2024-03-05 NOTE — PROGRESS NOTES
SUBJECTIVE     Chief Complaint   Patient presents with    Establish Care       HPI  Larry Madden is a 34 y.o. male with  Asthma, GIOVANNA, anxiety, depression  that presents for establishment of care.     This is a new patient to me but established to [facility]. PCP is Leif Goode MD.     Patient has GIOVANNA, uses CPAP.  Established with sleep medicine.    Patient has asthma, established with Allergy/immunology.  Patient uses albuterol rescue inhaler, Symbicort controller inhaler.  Patient also using Dupixent injections good benefit.    Patient established with Psychiatry for depression anxiety.  Patient on lamotrigine, fluvoxamine, diazepam p.r.n., clonidine q.h.s..  Symptoms stable.    Patient prescribed gemfibrozil, lactulose p.r.n. for liver health that was started by a physician at outside clinic.  Review of prior CMP is shows normal LFTs.  Prior lipid panel shows normal triglyceride levels.    Tobacco: No  EtOH: 2 drinks weekly  Recreational Drugs:   Social: Works as a , lives alone. Sexually active with 1 female partner.     PAST MEDICAL HISTORY:  Past Medical History:   Diagnosis Date    Anxiety     Asthma        PAST SURGICAL HISTORY:  Past Surgical History:   Procedure Laterality Date    APPLICATION OF CARTILAGE GRAFT  4/21/2022    Procedure: APPLICATION, CARTILAGE GRAFT;  Surgeon: Rg Orantes III, MD;  Location: [facility];  Service: ENT;;    EXCISION TURBINATE, SUBMUCOUS      NASAL RECONSTRUCTION N/A 4/21/2022    Procedure: RECONSTRUCTION, NOSE;  Surgeon: Rg Orantes III, MD;  Location: [facility];  Service: ENT;  Laterality: N/A;    NASAL SEPTOPLASTY Bilateral 12/3/2020    Procedure: SEPTOPLASTY, NOSE;  Surgeon: Rg Orantes III, MD;  Location: [facility] OR;  Service: Plastics;  Laterality: Bilateral;    NASAL SEPTOPLASTY N/A 4/21/2022    Procedure: SEPTOPLASTY, NOSE;  Surgeon: Rg Orantes III, MD;  Location: [facility] OR;  Service: ENT;  Laterality: N/A;    NASAL STENOSIS  REPAIR Bilateral 12/3/2020    Procedure: REPAIR, STENOSIS, NOSE, VESTIBULE;  Surgeon: Rg Orantes III, MD;  Location: [facility] OR;  Service: Plastics;  Laterality: Bilateral;  Nasal reconstruction with osteotomies and revision / FOLLOW DR ELLISON ANESTHESIA PROTOCAL     23 hour admit GIOVANNA    NASAL TURBINATE REDUCTION  2022    Procedure: REDUCTION, NASAL TURBINATE;  Surgeon: Rg Orantes III, MD;  Location: [facility] OR;  Service: ENT;;    SHOULDER ARTHROSCOPY W/ LABRAL REPAIR      SINUS SURGERY         FAMILY HISTORY:  Family History   Problem Relation Age of Onset    Depression Mother     Arthritis Father     Cirrhosis Neg Hx        ALLERGIES AND MEDICATIONS: updated and reviewed.  Review of patient's allergies indicates:  No Known Allergies  Current Outpatient Medications   Medication Sig Dispense Refill    albuterol (PROVENTIL/VENTOLIN HFA) 90 mcg/actuation inhaler SMARTSI-2 Puff(s) By Mouth Every 4-6 Hours PRN      cetirizine (ZYRTEC) 10 MG tablet Take 10 mg by mouth.      cloNIDine HCL 0.1 mg Tb12 Take 1 tablet by mouth every evening.      diazePAM (VALIUM) 5 MG tablet Take 5 mg by mouth every 12 (twelve) hours as needed for Anxiety.      EPINEPHrine (EPIPEN) 0.3 mg/0.3 mL AtIn Daily prn 1 each 3    finasteride (PROSCAR) 5 mg tablet TK 1/2 T PO EVERY OTHER DAY  12    fluvoxaMINE (LUVOX) 150 mg 24 hr capsule       fluvoxaMINE 100 mg Cp24       methylPREDNISolone (MEDROL DOSEPACK) 4 mg tablet use as directed 1 each 0    montelukast (SINGULAIR) 10 mg tablet Take 10 mg by mouth every evening.       No current facility-administered medications for this visit.       ROS  Review of Systems   Constitutional:  Negative for activity change, chills and fever.   HENT:  Negative for congestion and hearing loss.    Eyes:  Negative for pain and visual disturbance.   Respiratory:  Negative for cough and shortness of breath.    Cardiovascular:  Negative for chest pain and palpitations.   Gastrointestinal:  Negative for  abdominal pain, constipation, diarrhea, nausea and vomiting.   Endocrine: Negative.    Genitourinary: Negative.    Musculoskeletal:  Negative for arthralgias and myalgias.   Skin: Negative.    Allergic/Immunologic: Negative.    Neurological:  Negative for dizziness, light-headedness and headaches.   Hematological: Negative.          OBJECTIVE     Physical Exam  Vitals:    03/05/24 1508   BP: 120/82   Pulse: 86    Body mass index is 31.64 kg/m².            Physical Exam  Vitals reviewed.   Constitutional:       General: He is not in acute distress.     Appearance: Normal appearance.   HENT:      Head: Normocephalic and atraumatic.      Mouth/Throat:      Mouth: Mucous membranes are moist.      Pharynx: Oropharynx is clear.   Eyes:      Extraocular Movements: Extraocular movements intact.      Conjunctiva/sclera: Conjunctivae normal.      Pupils: Pupils are equal, round, and reactive to light.   Cardiovascular:      Rate and Rhythm: Normal rate and regular rhythm.      Pulses: Normal pulses.      Heart sounds: Normal heart sounds.   Pulmonary:      Effort: Pulmonary effort is normal.      Breath sounds: Normal breath sounds.   Abdominal:      General: There is no distension.   Musculoskeletal:         General: Normal range of motion.      Cervical back: Normal range of motion and neck supple.   Skin:     General: Skin is warm and dry.   Neurological:      General: No focal deficit present.      Mental Status: He is alert.           Health Maintenance         Date Due Completion Date    TETANUS VACCINE 03/28/2008 3/28/1998    Influenza Vaccine (1) 09/01/2023 9/4/2021    COVID-19 Vaccine (6 - 2023-24 season) 09/01/2023 9/7/2022    Pneumococcal Vaccines (Age 0-64) (2 of 2 - PCV) 10/12/2023 10/12/2022    HIV Screening 11/02/2026 (Originally 2/19/2003) ---    Hemoglobin A1c (Diabetic Prevention Screening) 08/30/2025 8/30/2022    Lipid Panel 05/23/2027 5/23/2022              ASSESSMENT     34 y.o. male with     1. Annual  physical exam    2. Establishing care with new doctor, encounter for    3. Routine general medical examination at a health care facility    4. Anxiety and depression    5. Asthma, intermittent, uncomplicated    6. GIOVANNA (obstructive sleep apnea)        PLAN:     1. Annual physical exam  - Discussed age and gender appropriate screenings at this visit and encouraged a healthy diet low in simple carbohydrates, and increased physical activity.  Counseled on medically appropriate vaccines based on age and current health condition.  Screening test reviewed and discussed with patient.     2. Establishing care with new doctor, encounter for  - Prior notes/labs/imaging reviewed.    3. Routine general medical examination at a health care facility  - Hemoglobin A1C; Future  - TSH; Future  - Lipid Panel; Future  - Comprehensive Metabolic Panel; Future  - CBC Auto Differential; Future    4. Anxiety and depression  - Controlled on current regimen. Continue follow up with Psych.     5. Asthma, intermittent, uncomplicated  - Controlled on current regimen. Continue follow up with A/I.     6. GIOVANNA (obstructive sleep apnea)  - Stable. Continue nightly CPAP use.         RTC in 1 year, earlier PRN       Ridge Young MD  Family Medicine  [facility]  03/05/2024    This document was created using voice recognition software (M*Modal Fluency Direct). Although it may be edited, this document may contain errors related to incorrect recognition of the spoken word. Please call the physician if clarification is needed.       No follow-ups on file.

## 2024-03-05 NOTE — PATIENT INSTRUCTIONS
Mediterranean style diet:    Eat:  Olive oil, lean meats such as chicken and fish and only small servings of carbohydrates.   Olive oil and vinegar instead of low fat salad dressings.  Cook food in olive oil.  You can pan mena or saute fish and vegetables instead of boiling or baking.  Unsalted nuts for snacks. Walnuts, cashews, almonds, pecans and pistachios ( not peanuts). Try almond butter or cashew butter on toast or crackers.  Brown bread. You can also dip bread in olive oil and eat it as a snack or appetizer  Seasonal or frozen vegetables and fruits.     Avoid:  Saturated fats and deep fried foods. Also stay away from large servings of starches, sweets, desserts and sugary drinks (both sodas and fruit juices)    Physical Activity Recommendations  Aerobic (or cardio) activity gets your heart rate up and benefits your heart by improving cardiorespiratory fitness. When done at moderate intensity, your heart will beat faster and youll breathe harder than normal, but youll still be able to talk. Think of it as a medium or moderate amount of effort.    It is recommended that you do 30 minutes of moderate-intensity aerobic exercise at least 5 days a week.     Examples of moderate-intensity aerobic activities:    - brisk walking (at least 2.5 miles per hour)  - water aerobics  - dancing (ballroom or social)  - gardening  - tennis (doubles)  - biking slower than 10 miles per hour

## 2024-03-06 ENCOUNTER — LAB VISIT (OUTPATIENT)
Dept: LAB | Facility: HOSPITAL | Age: 34
End: 2024-03-06
Attending: STUDENT IN AN ORGANIZED HEALTH CARE EDUCATION/TRAINING PROGRAM
Payer: COMMERCIAL

## 2024-03-06 DIAGNOSIS — Z00.00 ROUTINE GENERAL MEDICAL EXAMINATION AT A HEALTH CARE FACILITY: ICD-10-CM

## 2024-03-06 LAB
ALBUMIN SERPL BCP-MCNC: 4.3 G/DL (ref 3.5–5.2)
ALP SERPL-CCNC: 62 U/L (ref 55–135)
ALT SERPL W/O P-5'-P-CCNC: 27 U/L (ref 10–44)
ANION GAP SERPL CALC-SCNC: 8 MMOL/L (ref 8–16)
AST SERPL-CCNC: 17 U/L (ref 10–40)
BASOPHILS # BLD AUTO: 0.05 K/UL (ref 0–0.2)
BASOPHILS NFR BLD: 0.9 % (ref 0–1.9)
BILIRUB SERPL-MCNC: 0.3 MG/DL (ref 0.1–1)
BUN SERPL-MCNC: 13 MG/DL (ref 6–20)
CALCIUM SERPL-MCNC: 9.5 MG/DL (ref 8.7–10.5)
CHLORIDE SERPL-SCNC: 107 MMOL/L (ref 95–110)
CHOLEST SERPL-MCNC: 147 MG/DL (ref 120–199)
CHOLEST/HDLC SERPL: 3.2 {RATIO} (ref 2–5)
CO2 SERPL-SCNC: 25 MMOL/L (ref 23–29)
CREAT SERPL-MCNC: 1.2 MG/DL (ref 0.5–1.4)
DIFFERENTIAL METHOD BLD: ABNORMAL
EOSINOPHIL # BLD AUTO: 0.1 K/UL (ref 0–0.5)
EOSINOPHIL NFR BLD: 1.1 % (ref 0–8)
ERYTHROCYTE [DISTWIDTH] IN BLOOD BY AUTOMATED COUNT: 12.2 % (ref 11.5–14.5)
EST. GFR  (NO RACE VARIABLE): >60 ML/MIN/1.73 M^2
ESTIMATED AVG GLUCOSE: 103 MG/DL (ref 68–131)
GLUCOSE SERPL-MCNC: 98 MG/DL (ref 70–110)
HBA1C MFR BLD: 5.2 % (ref 4–5.6)
HCT VFR BLD AUTO: 46.5 % (ref 40–54)
HDLC SERPL-MCNC: 46 MG/DL (ref 40–75)
HDLC SERPL: 31.3 % (ref 20–50)
HGB BLD-MCNC: 15.5 G/DL (ref 14–18)
IMM GRANULOCYTES # BLD AUTO: 0.02 K/UL (ref 0–0.04)
IMM GRANULOCYTES NFR BLD AUTO: 0.4 % (ref 0–0.5)
LDLC SERPL CALC-MCNC: 89.4 MG/DL (ref 63–159)
LYMPHOCYTES # BLD AUTO: 2.3 K/UL (ref 1–4.8)
LYMPHOCYTES NFR BLD: 42.2 % (ref 18–48)
MCH RBC QN AUTO: 32 PG (ref 27–31)
MCHC RBC AUTO-ENTMCNC: 33.3 G/DL (ref 32–36)
MCV RBC AUTO: 96 FL (ref 82–98)
MONOCYTES # BLD AUTO: 0.3 K/UL (ref 0.3–1)
MONOCYTES NFR BLD: 6.2 % (ref 4–15)
NEUTROPHILS # BLD AUTO: 2.6 K/UL (ref 1.8–7.7)
NEUTROPHILS NFR BLD: 49.2 % (ref 38–73)
NONHDLC SERPL-MCNC: 101 MG/DL
NRBC BLD-RTO: 0 /100 WBC
PLATELET # BLD AUTO: 308 K/UL (ref 150–450)
PMV BLD AUTO: 11.1 FL (ref 9.2–12.9)
POTASSIUM SERPL-SCNC: 4.6 MMOL/L (ref 3.5–5.1)
PROT SERPL-MCNC: 7.2 G/DL (ref 6–8.4)
RBC # BLD AUTO: 4.85 M/UL (ref 4.6–6.2)
SODIUM SERPL-SCNC: 140 MMOL/L (ref 136–145)
TRIGL SERPL-MCNC: 58 MG/DL (ref 30–150)
TSH SERPL DL<=0.005 MIU/L-ACNC: 0.67 UIU/ML (ref 0.4–4)
WBC # BLD AUTO: 5.33 K/UL (ref 3.9–12.7)

## 2024-03-06 PROCEDURE — 83036 HEMOGLOBIN GLYCOSYLATED A1C: CPT | Performed by: STUDENT IN AN ORGANIZED HEALTH CARE EDUCATION/TRAINING PROGRAM

## 2024-03-06 PROCEDURE — 85025 COMPLETE CBC W/AUTO DIFF WBC: CPT | Performed by: STUDENT IN AN ORGANIZED HEALTH CARE EDUCATION/TRAINING PROGRAM

## 2024-03-06 PROCEDURE — 36415 COLL VENOUS BLD VENIPUNCTURE: CPT | Performed by: STUDENT IN AN ORGANIZED HEALTH CARE EDUCATION/TRAINING PROGRAM

## 2024-03-06 PROCEDURE — 84443 ASSAY THYROID STIM HORMONE: CPT | Performed by: STUDENT IN AN ORGANIZED HEALTH CARE EDUCATION/TRAINING PROGRAM

## 2024-03-06 PROCEDURE — 80061 LIPID PANEL: CPT | Performed by: STUDENT IN AN ORGANIZED HEALTH CARE EDUCATION/TRAINING PROGRAM

## 2024-03-06 PROCEDURE — 80053 COMPREHEN METABOLIC PANEL: CPT | Performed by: STUDENT IN AN ORGANIZED HEALTH CARE EDUCATION/TRAINING PROGRAM

## 2024-03-12 ENCOUNTER — PATIENT MESSAGE (OUTPATIENT)
Dept: INTERNAL MEDICINE | Facility: CLINIC | Age: 34
End: 2024-03-12
Payer: COMMERCIAL

## 2024-03-18 ENCOUNTER — ON-DEMAND VIRTUAL (OUTPATIENT)
Dept: URGENT CARE | Facility: CLINIC | Age: 34
End: 2024-03-18
Payer: COMMERCIAL

## 2024-03-18 ENCOUNTER — OFFICE VISIT (OUTPATIENT)
Dept: URGENT CARE | Facility: CLINIC | Age: 34
End: 2024-03-18
Payer: COMMERCIAL

## 2024-03-18 VITALS
WEIGHT: 214 LBS | SYSTOLIC BLOOD PRESSURE: 121 MMHG | HEIGHT: 69 IN | TEMPERATURE: 98 F | OXYGEN SATURATION: 95 % | HEART RATE: 75 BPM | DIASTOLIC BLOOD PRESSURE: 88 MMHG | RESPIRATION RATE: 20 BRPM | BODY MASS INDEX: 31.7 KG/M2

## 2024-03-18 DIAGNOSIS — K62.89 ANAL OR RECTAL PAIN: Primary | ICD-10-CM

## 2024-03-18 DIAGNOSIS — R10.9 ABDOMINAL PAIN, UNSPECIFIED ABDOMINAL LOCATION: ICD-10-CM

## 2024-03-18 DIAGNOSIS — B86 SCABIES: ICD-10-CM

## 2024-03-18 LAB
BILIRUB UR QL STRIP: NEGATIVE
GLUCOSE UR QL STRIP: NEGATIVE
KETONES UR QL STRIP: NEGATIVE
LEUKOCYTE ESTERASE UR QL STRIP: NEGATIVE
PH, POC UA: 5 (ref 5–8)
POC BLOOD, URINE: NEGATIVE
POC NITRATES, URINE: NEGATIVE
PROT UR QL STRIP: NEGATIVE
SP GR UR STRIP: 1.02 (ref 1–1.03)
UROBILINOGEN UR STRIP-ACNC: NORMAL (ref 0.3–2.2)

## 2024-03-18 PROCEDURE — 81003 URINALYSIS AUTO W/O SCOPE: CPT | Mod: QW,S$GLB,,

## 2024-03-18 PROCEDURE — 99213 OFFICE O/P EST LOW 20 MIN: CPT | Mod: 25,S$GLB,,

## 2024-03-18 PROCEDURE — 99213 OFFICE O/P EST LOW 20 MIN: CPT | Mod: 95,,, | Performed by: FAMILY MEDICINE

## 2024-03-18 RX ORDER — PERMETHRIN 50 MG/G
CREAM TOPICAL ONCE
Qty: 60 G | Refills: 1 | Status: SHIPPED | OUTPATIENT
Start: 2024-03-18 | End: 2024-03-18

## 2024-03-18 RX ORDER — ERGOCALCIFEROL 1.25 MG/1
50000 CAPSULE ORAL
COMMUNITY
Start: 2024-03-14

## 2024-03-18 RX ORDER — DUPILUMAB 300 MG/2ML
INJECTION, SOLUTION SUBCUTANEOUS
COMMUNITY
Start: 2024-03-11

## 2024-03-18 NOTE — PROGRESS NOTES
"Subjective:      Patient ID: Larry Madden is a 34 y.o. male.    Vitals:  height is 5' 9" (1.753 m) and weight is 97.1 kg (214 lb). His oral temperature is 98.3 °F (36.8 °C). His blood pressure is 121/88 and his pulse is 75. His respiration is 20 and oxygen saturation is 95%.     Chief Complaint: Abdominal Pain (Entered by patient)    Pt statted that his pelvic floor may be bruised. Patient states he is having rectal pain starting Friday. He states he was holding his niece for a long time when he first noticed symptoms. States pain worse with sitting and having intercourse. Denies taking anything for the pain. Denies hx of this type of pain. Unsure if there is a rash there or not. Denies feeling anything protruding. He does have a fullness feeling in his abdomen, but no pain. Patient having normal bowel movements without pain.   Pt is also c/o small red bumps on both legs and both hands that has been there for about a month. Slightly itchy. Does not notice a change in intensity between day and night. Denies putting anything on it. He does live alone.     Abdominal Pain  This is a new problem. The current episode started in the past 7 days (saturday). The problem has been gradually worsening. The pain is at a severity of 4/10. The pain is moderate. The quality of the pain is sharp. The abdominal pain radiates to the perineum. Pertinent negatives include no anorexia, arthralgias, belching, constipation, diarrhea, dysuria, fever, flatus, frequency, headaches, hematochezia, hematuria, melena, myalgias, nausea, vomiting or weight loss. Nothing aggravates the pain. The pain is relieved by Nothing. He has tried nothing for the symptoms.     Constitution: Negative for fever.   Gastrointestinal:  Positive for rectal pain. Negative for abdominal pain, nausea, vomiting, constipation, diarrhea, bright red blood in stool and rectal bleeding.   Genitourinary:  Negative for dysuria, frequency and hematuria. "   Musculoskeletal:  Negative for joint pain and muscle ache.   Skin:  Positive for rash and erythema.   Allergic/Immunologic: Positive for itching.   Neurological:  Negative for headaches.      Objective:     Physical Exam   Constitutional: He is oriented to person, place, and time. He appears well-developed.   HENT:   Head: Normocephalic and atraumatic. Head is without abrasion, without contusion and without laceration.   Ears:   Right Ear: External ear normal.   Left Ear: External ear normal.   Nose: Nose normal.   Mouth/Throat: Oropharynx is clear and moist and mucous membranes are normal.   Eyes: Conjunctivae, EOM and lids are normal. Pupils are equal, round, and reactive to light.   Neck: Trachea normal and phonation normal. Neck supple.   Cardiovascular: Normal rate, regular rhythm and normal heart sounds.   Pulmonary/Chest: Effort normal and breath sounds normal. No stridor. No respiratory distress.   Genitourinary: Rectum:      No rectal mass, anal fissure, tenderness, external hemorrhoid, internal hemorrhoid or abnormal anal tone.     Musculoskeletal: Normal range of motion.         General: Normal range of motion.   Neurological: He is alert and oriented to person, place, and time.   Skin: Skin is warm, dry, intact and rash. Capillary refill takes less than 2 seconds. erythema No abrasion, No burn, No bruising and No ecchymosis         Comments: Multiple excoriations noted to the interwebbings of the hands. Erythematous lesions noted to the legs and up the arms. No surrounding swelling. No discharge noted.    Psychiatric: His speech is normal and behavior is normal. Judgment and thought content normal.   Nursing note and vitals reviewed.chaperone present (Nasreen, PA-S)       Results for orders placed or performed in visit on 03/18/24   POCT Urinalysis, Dipstick, Automated, W/O Scope   Result Value Ref Range    POC Blood, Urine Negative Negative    POC Bilirubin, Urine Negative Negative    POC Urobilinogen,  Urine Norm 0.3 - 2.2    POC Ketones, Urine Negative Negative    POC Protein, Urine Negative Negative    POC Nitrates, Urine Negative Negative    POC Glucose, Urine Negative Negative    pH, UA 5.0 5 - 8    POC Specific Gravity, Urine 1.020 1.003 - 1.029    POC Leukocytes, Urine Negative Negative       Assessment:     1. Anal or rectal pain    2. Abdominal pain, unspecified abdominal location    3. Scabies        Plan:       Anal or rectal pain  -     Ambulatory referral/consult to Gastroenterology    Abdominal pain, unspecified abdominal location  -     POCT Urinalysis, Dipstick, Automated, W/O Scope    Scabies  -     permethrin (ELIMITE) 5 % cream; Apply topically once. for 1 dose  Dispense: 60 g; Refill: 1  -     Ambulatory referral/consult to Dermatology                Patient Instructions   - Use permetherin cream for scabies rash. Can reapply in 2 weeks if still having symptoms.   - If rash is persistent or worsens, follow up with dermatology.     A referral for Dermatology and Gastroenterology has been placed. Call [phone] to schedule an appointment. Make sure to follow up in 1-2 weeks or sooner if symptoms continue or worsen.     - Rectal pain could be due to soreness. Try taking tylenol or ibuprofen as needed for pain. If symptoms not improving or worsen, follow up with Gastroenterology.   - Acetaminophen (tylenol) or Ibuprofen (advil,motrin) as directed as needed for fever/pain. Avoid tylenol if you have a history of liver disease. Do not take ibuprofen if you have a history of GI bleeding, kidney disease, or if you take blood thinners.   - Ibuprofen dosing for adults: 400 mg by mouth every 4-6 hours as needed. Max: 2400 mg/day; Info: use lowest effective dose, shortest effective treatment duration; give w/ food if GI upset occurs.  - Tylenol dosing for adults: [By mouth route, immediate-release form] Dose: 325-1000 mg by mouth every 4-6h as needed; Max: 1 g/4h and 4 g/day from all sources. [By mouth  route, extended-release form] Dose: 650-1300 mg Extended Release by mouth every 8h as needed; Max: 4 g/day from all sources.     - You must understand that you have received an Urgent Care treatment only and that you may be released before all of your medical problems are known or treated.   - You, the patient, will arrange for follow up care as instructed.   - If your condition worsens or fails to improve we recommend that you receive another evaluation at the ER immediately or contact your PCP to discuss your concerns or return here.   - Follow up with your PCP or specialty clinic as directed in the next 1-2 weeks if not improved or as needed.  You can call [phone] to schedule an appointment with the appropriate provider.    If your symptoms do not improve or worsen, go to the emergency room immediately.

## 2024-03-18 NOTE — PROGRESS NOTES
Subjective:      Patient ID: Larry Madden is a 34 y.o. male.    Vitals:  vitals were not taken for this visit.     Chief Complaint: No chief complaint on file.      Visit Type: TELE AUDIOVISUAL    Present with the patient at the time of consultation: TELEMED PRESENT WITH PATIENT: None    Past Medical History:   Diagnosis Date    Anxiety     Asthma      Past Surgical History:   Procedure Laterality Date    APPLICATION OF CARTILAGE GRAFT  2022    Procedure: APPLICATION, CARTILAGE GRAFT;  Surgeon: Rg Orantes III, MD;  Location: [facility];  Service: ENT;;    EXCISION TURBINATE, SUBMUCOUS      NASAL RECONSTRUCTION N/A 2022    Procedure: RECONSTRUCTION, NOSE;  Surgeon: Rg Orantes III, MD;  Location: [facility] OR;  Service: ENT;  Laterality: N/A;    NASAL SEPTOPLASTY Bilateral 12/3/2020    Procedure: SEPTOPLASTY, NOSE;  Surgeon: Rg Orantes III, MD;  Location: Psychiatric;  Service: Plastics;  Laterality: Bilateral;    NASAL SEPTOPLASTY N/A 2022    Procedure: SEPTOPLASTY, NOSE;  Surgeon: Rg Orantes III, MD;  Location: [facility];  Service: ENT;  Laterality: N/A;    NASAL STENOSIS REPAIR Bilateral 12/3/2020    Procedure: REPAIR, STENOSIS, NOSE, VESTIBULE;  Surgeon: Rg Orantes III, MD;  Location: [facility];  Service: Plastics;  Laterality: Bilateral;  Nasal reconstruction with osteotomies and revision / FOLLOW DR ELLISON ANESTHESIA PROTOCAL     23 hour admit GIOVANNA    NASAL TURBINATE REDUCTION  2022    Procedure: REDUCTION, NASAL TURBINATE;  Surgeon: Rg Orantes III, MD;  Location: [facility];  Service: ENT;;    SHOULDER ARTHROSCOPY W/ LABRAL REPAIR      SINUS SURGERY       Review of patient's allergies indicates:  No Known Allergies  Current Outpatient Medications on File Prior to Visit   Medication Sig Dispense Refill    albuterol (PROVENTIL/VENTOLIN HFA) 90 mcg/actuation inhaler SMARTSI-2 Puff(s) By Mouth Every 4-6 Hours PRN      cloNIDine HCL 0.1 mg Tb12 Take 1 tablet by mouth  every evening.      diazePAM (VALIUM) 5 MG tablet Take 5 mg by mouth every 12 (twelve) hours as needed for Anxiety.      EPINEPHrine (EPIPEN) 0.3 mg/0.3 mL AtIn Daily prn 1 each 3    finasteride (PROSCAR) 5 mg tablet TK 1/2 T PO EVERY OTHER DAY  12    fluvoxaMINE (LUVOX) 150 mg 24 hr capsule Take 300 mg by mouth every evening.      gemfibroziL (LOPID) 600 MG tablet Take 600 mg by mouth every evening.      lactulose (CHRONULAC) 10 gram/15 mL solution Take by mouth.      lamoTRIgine (LAMICTAL) 25 MG tablet Take 25 mg by mouth once daily.      naltrexone (DEPADE) 50 mg tablet Take 50 mg by mouth once daily.      ruxolitinib (OPZELURA) 1.5 % Crea 1 Application.      SYMBICORT 80-4.5 mcg/actuation HFAA Inhale 2 puffs into the lungs 2 (two) times a day.       No current facility-administered medications on file prior to visit.     Family History   Problem Relation Age of Onset    Depression Mother     Arthritis Father     Cirrhosis Neg Hx            Ohs Peq Odvv Intake    3/18/2024 12:46 PM CDT - Filed by Patient   What is your current physical address in the event of a medical emergency? [address]   Are you able to take your vital signs? No   Please attach any relevant images or files          HPI: Pt reports discomfort and mild pain in anal area for 2 days. States the pain started after lifting her 50 pound niece. Denies any  injury. Denies constipation, hemorrhoids, diarrhea, abd pain, dysuria, N/V, fever.   ROS     Objective:   The physical exam was conducted virtually.  Physical Exam   Constitutional:  Non-toxic appearance. He does not appear ill. No distress.   Pulmonary/Chest: No stridor. No respiratory distress. He has no wheezes.   Abdominal: He exhibits no distension. There is no abdominal tenderness.   Skin: Skin is not diaphoretic.       Assessment:     1. Anal or rectal pain        Plan:   I recommended a provider visit and advised to come to one [facility] for further eval.   Pt agreed to plan.    I provided the UC address.     Anal or rectal pain

## 2024-03-18 NOTE — PATIENT INSTRUCTIONS
- Use permetherin cream for scabies rash. Can reapply in 2 weeks if still having symptoms.   - If rash is persistent or worsens, follow up with dermatology.     A referral for Dermatology and Gastroenterology has been placed. Call [phone] to schedule an appointment. Make sure to follow up in 1-2 weeks or sooner if symptoms continue or worsen.     - Rectal pain could be due to soreness. Try taking tylenol or ibuprofen as needed for pain. If symptoms not improving or worsen, follow up with Gastroenterology.   - Acetaminophen (tylenol) or Ibuprofen (advil,motrin) as directed as needed for fever/pain. Avoid tylenol if you have a history of liver disease. Do not take ibuprofen if you have a history of GI bleeding, kidney disease, or if you take blood thinners.   - Ibuprofen dosing for adults: 400 mg by mouth every 4-6 hours as needed. Max: 2400 mg/day; Info: use lowest effective dose, shortest effective treatment duration; give w/ food if GI upset occurs.  - Tylenol dosing for adults: [By mouth route, immediate-release form] Dose: 325-1000 mg by mouth every 4-6h as needed; Max: 1 g/4h and 4 g/day from all sources. [By mouth route, extended-release form] Dose: 650-1300 mg Extended Release by mouth every 8h as needed; Max: 4 g/day from all sources.     - You must understand that you have received an Urgent Care treatment only and that you may be released before all of your medical problems are known or treated.   - You, the patient, will arrange for follow up care as instructed.   - If your condition worsens or fails to improve we recommend that you receive another evaluation at the ER immediately or contact your PCP to discuss your concerns or return here.   - Follow up with your PCP or specialty clinic as directed in the next 1-2 weeks if not improved or as needed.  You can call [phone] to schedule an appointment with the appropriate provider.    If your symptoms do not improve or worsen, go to the emergency  room immediately.

## 2024-03-19 ENCOUNTER — OFFICE VISIT (OUTPATIENT)
Dept: DERMATOLOGY | Facility: CLINIC | Age: 34
End: 2024-03-19
Payer: COMMERCIAL

## 2024-03-19 DIAGNOSIS — L73.9 FOLLICULITIS: Primary | ICD-10-CM

## 2024-03-19 DIAGNOSIS — H01.139 ECZEMA OF EYELID, UNSPECIFIED LATERALITY: ICD-10-CM

## 2024-03-19 PROCEDURE — G2211 COMPLEX E/M VISIT ADD ON: HCPCS | Mod: S$GLB,,, | Performed by: DERMATOLOGY

## 2024-03-19 PROCEDURE — 99214 OFFICE O/P EST MOD 30 MIN: CPT | Mod: S$GLB,,, | Performed by: DERMATOLOGY

## 2024-03-19 PROCEDURE — 99999 PR PBB SHADOW E&M-EST. PATIENT-LVL IV: CPT | Mod: PBBFAC,,, | Performed by: DERMATOLOGY

## 2024-03-19 RX ORDER — TRIAMCINOLONE ACETONIDE 1 MG/G
CREAM TOPICAL NIGHTLY
Qty: 45 G | Refills: 0 | Status: SHIPPED | OUTPATIENT
Start: 2024-03-19

## 2024-03-19 RX ORDER — HYDROCORTISONE 25 MG/G
CREAM TOPICAL 2 TIMES DAILY
Qty: 30 G | Refills: 2 | Status: SHIPPED | OUTPATIENT
Start: 2024-03-19

## 2024-03-19 RX ORDER — ERYTHROMYCIN 20 MG/ML
SOLUTION TOPICAL EVERY MORNING
Qty: 60 ML | Refills: 0 | Status: SHIPPED | OUTPATIENT
Start: 2024-03-19 | End: 2025-03-19

## 2024-03-19 NOTE — PROGRESS NOTES
Subjective:      Patient ID:  Larry Madden is a 34 y.o. male who presents for   Chief Complaint   Patient presents with    Rash     All over body    Dry Skin     Under eyes     Rash - Initial  Affected locations: All over body.  Duration: 1 week  Severity: mild to moderate  Timing: constant  Relieving factors/Treatments tried: nothing    Dry Skin - Initial  Affected locations: left eye and right eye  Duration: Pt stated years.  Signs / symptoms: dryness  Severity: mild  Timing: constant  Treatments tried: Rx ointment.      Review of Systems   Constitutional:  Negative for fever and chills.   HENT:  Negative for sore throat.    Respiratory:  Negative for cough.    Genitourinary:  Negative for genital itching.   Skin:  Positive for rash and dry skin. Negative for itching.       Objective:   Physical Exam   Constitutional: He appears well-developed and well-nourished.   Neurological: He is alert and oriented to person, place, and time.   Psychiatric: He has a normal mood and affect.   Skin:               Diagram Legend     Erythematous scaling macule/papule c/w actinic keratosis       Vascular papule c/w angioma      Pigmented verrucoid papule/plaque c/w seborrheic keratosis      Yellow umbilicated papule c/w sebaceous hyperplasia      Irregularly shaped tan macule c/w lentigo     1-2 mm smooth white papules consistent with Milia      Movable subcutaneous cyst with punctum c/w epidermal inclusion cyst      Subcutaneous movable cyst c/w pilar cyst      Firm pink to brown papule c/w dermatofibroma      Pedunculated fleshy papule(s) c/w skin tag(s)      Evenly pigmented macule c/w junctional nevus     Mildly variegated pigmented, slightly irregular-bordered macule c/w mildly atypical nevus      Flesh colored to evenly pigmented papule c/w intradermal nevus       Pink pearly papule/plaque c/w basal cell carcinoma      Erythematous hyperkeratotic cursted plaque c/w SCC      Surgical scar with no sign of skin  cancer recurrence      Open and closed comedones      Inflammatory papules and pustules      Verrucoid papule consistent consistent with wart     Erythematous eczematous patches and plaques     Dystrophic onycholytic nail with subungual debris c/w onychomycosis     Umbilicated papule    Erythematous-base heme-crusted tan verrucoid plaque consistent with inflamed seborrheic keratosis     Erythematous Silvery Scaling Plaque c/w Psoriasis     See annotation          Legs and dorsal hands with focal pinpoint red papules and focal pustule.    Assessment / Plan:        Folliculitis  -     erythromycin with ethanoL (THERAMYCIN) 2 % external solution; Apply topically every morning. To sores  Dispense: 60 mL; Refill: 0  -     triamcinolone acetonide 0.1% (KENALOG) 0.1 % cream; Apply topically every evening. To sores  Dispense: 45 g; Refill: 0  Discussed with patient the etiology and pathogenesis of the disease or skin lesion(s) and possible treatments and aggravators.    Suspect bites.  With sequelae.    Discussed with patient that clinical presentation and lesions are consistent with insect bites.  Insect bites can cause itching and persistent lesions for weeks due to a person's sensitivity and immune system.  Spots can appear in a delayed fashion out of sync with each other over weeks.  Other members of the household may not have any signs.  We cannot tell what type of insect is causing the problem, and the patient may need to review their household environment for bed bugs, ants, fleas, gnats, et cetera.    Patient to watch for recurrence or flares or worsening and to call the clinic for a follow up appointment for such.    Patient to use antibacterial otc soap daily to affected areas.    Though unlikely to be scabies, pt can cons doing 1 elimite tx to be sure.  But pt denies any itching even post 3-4 wks.    Eczema of eyelid, unspecified laterality  -     hydrocortisone 2.5 % cream; Apply topically 2 (two) times daily.  Prn eyelid eczema.Stop using steroid topical when skin is smooth and non itchy.  Do not treat dark or red coloring.  Dispense: 30 g; Refill: 2  Discussed with patient the etiology and pathogenesis of the disease or skin lesion(s) and possible treatments and aggravators.    Reviewed with patient different treatment options and associated risks.  Proper application of medications and or care for affected area(s) and condition(s) reviewed.  Instructed patient to use petroleum jelly at least daily on affected areas.  Chronic nature of this condition discussed with patient.               Follow up if symptoms worsen or fail to improve.

## 2024-03-19 NOTE — PATIENT INSTRUCTIONS
No hot water bathing reviewed.    Apply petroleum jelly under eyes daily.    Bathe with antibacterial soap daily.

## 2024-06-10 ENCOUNTER — PATIENT MESSAGE (OUTPATIENT)
Dept: INTERNAL MEDICINE | Facility: CLINIC | Age: 34
End: 2024-06-10
Payer: COMMERCIAL

## 2024-08-06 ENCOUNTER — PATIENT MESSAGE (OUTPATIENT)
Dept: INTERNAL MEDICINE | Facility: CLINIC | Age: 34
End: 2024-08-06
Payer: COMMERCIAL

## 2024-08-06 DIAGNOSIS — L64.9 MALE PATTERN BALDNESS: Primary | ICD-10-CM

## 2024-08-08 RX ORDER — FINASTERIDE 5 MG/1
2.5 TABLET, FILM COATED ORAL EVERY OTHER DAY
Qty: 15 TABLET | Refills: 12 | Status: SHIPPED | OUTPATIENT
Start: 2024-08-08

## 2025-01-03 ENCOUNTER — OFFICE VISIT (OUTPATIENT)
Dept: INTERNAL MEDICINE | Facility: CLINIC | Age: 35
End: 2025-01-03
Payer: COMMERCIAL

## 2025-01-03 ENCOUNTER — LAB VISIT (OUTPATIENT)
Dept: LAB | Facility: OTHER | Age: 35
End: 2025-01-03
Attending: STUDENT IN AN ORGANIZED HEALTH CARE EDUCATION/TRAINING PROGRAM
Payer: COMMERCIAL

## 2025-01-03 VITALS
WEIGHT: 199.75 LBS | DIASTOLIC BLOOD PRESSURE: 78 MMHG | BODY MASS INDEX: 29.59 KG/M2 | SYSTOLIC BLOOD PRESSURE: 111 MMHG | HEART RATE: 83 BPM | OXYGEN SATURATION: 97 % | HEIGHT: 69 IN

## 2025-01-03 DIAGNOSIS — F32.A ANXIETY AND DEPRESSION: ICD-10-CM

## 2025-01-03 DIAGNOSIS — R61 NIGHT SWEATS: ICD-10-CM

## 2025-01-03 DIAGNOSIS — R61 NIGHT SWEATS: Primary | ICD-10-CM

## 2025-01-03 DIAGNOSIS — F41.9 ANXIETY AND DEPRESSION: ICD-10-CM

## 2025-01-03 DIAGNOSIS — Z23 NEEDS FLU SHOT: ICD-10-CM

## 2025-01-03 DIAGNOSIS — R74.01 ELEVATED TRANSAMINASE LEVEL: ICD-10-CM

## 2025-01-03 LAB
ALBUMIN SERPL BCP-MCNC: 4.3 G/DL (ref 3.5–5.2)
ALP SERPL-CCNC: 64 U/L (ref 40–150)
ALT SERPL W/O P-5'-P-CCNC: 108 U/L (ref 10–44)
ANION GAP SERPL CALC-SCNC: 10 MMOL/L (ref 8–16)
AST SERPL-CCNC: 68 U/L (ref 10–40)
BASOPHILS # BLD AUTO: 0.04 K/UL (ref 0–0.2)
BASOPHILS NFR BLD: 0.6 % (ref 0–1.9)
BILIRUB SERPL-MCNC: 0.3 MG/DL (ref 0.1–1)
BUN SERPL-MCNC: 14 MG/DL (ref 6–20)
CALCIUM SERPL-MCNC: 9.6 MG/DL (ref 8.7–10.5)
CHLORIDE SERPL-SCNC: 107 MMOL/L (ref 95–110)
CO2 SERPL-SCNC: 23 MMOL/L (ref 23–29)
CREAT SERPL-MCNC: 1 MG/DL (ref 0.5–1.4)
CRP SERPL-MCNC: 2.7 MG/L (ref 0–8.2)
DIFFERENTIAL METHOD BLD: ABNORMAL
EOSINOPHIL # BLD AUTO: 0.1 K/UL (ref 0–0.5)
EOSINOPHIL NFR BLD: 0.7 % (ref 0–8)
ERYTHROCYTE [DISTWIDTH] IN BLOOD BY AUTOMATED COUNT: 12.1 % (ref 11.5–14.5)
ERYTHROCYTE [SEDIMENTATION RATE] IN BLOOD BY PHOTOMETRIC METHOD: <2 MM/HR (ref 0–23)
EST. GFR  (NO RACE VARIABLE): >60 ML/MIN/1.73 M^2
GLUCOSE SERPL-MCNC: 64 MG/DL (ref 70–110)
HAV IGM SERPL QL IA: NORMAL
HBV CORE IGM SERPL QL IA: NORMAL
HBV SURFACE AG SERPL QL IA: NORMAL
HCT VFR BLD AUTO: 43.8 % (ref 40–54)
HCV AB SERPL QL IA: NEGATIVE
HGB BLD-MCNC: 15.2 G/DL (ref 14–18)
HIV 1+2 AB+HIV1 P24 AG SERPL QL IA: NEGATIVE
IMM GRANULOCYTES # BLD AUTO: 0.03 K/UL (ref 0–0.04)
IMM GRANULOCYTES NFR BLD AUTO: 0.4 % (ref 0–0.5)
LYMPHOCYTES # BLD AUTO: 2.2 K/UL (ref 1–4.8)
LYMPHOCYTES NFR BLD: 33.3 % (ref 18–48)
MCH RBC QN AUTO: 32.7 PG (ref 27–31)
MCHC RBC AUTO-ENTMCNC: 34.7 G/DL (ref 32–36)
MCV RBC AUTO: 94 FL (ref 82–98)
MONOCYTES # BLD AUTO: 0.5 K/UL (ref 0.3–1)
MONOCYTES NFR BLD: 7 % (ref 4–15)
NEUTROPHILS # BLD AUTO: 3.9 K/UL (ref 1.8–7.7)
NEUTROPHILS NFR BLD: 58 % (ref 38–73)
NRBC BLD-RTO: 0 /100 WBC
PLATELET # BLD AUTO: 321 K/UL (ref 150–450)
PMV BLD AUTO: 10.4 FL (ref 9.2–12.9)
POTASSIUM SERPL-SCNC: 4.8 MMOL/L (ref 3.5–5.1)
PROT SERPL-MCNC: 7.1 G/DL (ref 6–8.4)
RBC # BLD AUTO: 4.65 M/UL (ref 4.6–6.2)
SODIUM SERPL-SCNC: 140 MMOL/L (ref 136–145)
T4 FREE SERPL-MCNC: 0.97 NG/DL (ref 0.71–1.51)
TSH SERPL DL<=0.005 MIU/L-ACNC: 0.56 UIU/ML (ref 0.4–4)
WBC # BLD AUTO: 6.7 K/UL (ref 3.9–12.7)

## 2025-01-03 PROCEDURE — 80053 COMPREHEN METABOLIC PANEL: CPT | Performed by: STUDENT IN AN ORGANIZED HEALTH CARE EDUCATION/TRAINING PROGRAM

## 2025-01-03 PROCEDURE — 87389 HIV-1 AG W/HIV-1&-2 AB AG IA: CPT | Performed by: STUDENT IN AN ORGANIZED HEALTH CARE EDUCATION/TRAINING PROGRAM

## 2025-01-03 PROCEDURE — 84439 ASSAY OF FREE THYROXINE: CPT | Performed by: STUDENT IN AN ORGANIZED HEALTH CARE EDUCATION/TRAINING PROGRAM

## 2025-01-03 PROCEDURE — 80074 ACUTE HEPATITIS PANEL: CPT | Performed by: STUDENT IN AN ORGANIZED HEALTH CARE EDUCATION/TRAINING PROGRAM

## 2025-01-03 PROCEDURE — 85652 RBC SED RATE AUTOMATED: CPT | Performed by: STUDENT IN AN ORGANIZED HEALTH CARE EDUCATION/TRAINING PROGRAM

## 2025-01-03 PROCEDURE — 36415 COLL VENOUS BLD VENIPUNCTURE: CPT | Performed by: STUDENT IN AN ORGANIZED HEALTH CARE EDUCATION/TRAINING PROGRAM

## 2025-01-03 PROCEDURE — 84443 ASSAY THYROID STIM HORMONE: CPT | Performed by: STUDENT IN AN ORGANIZED HEALTH CARE EDUCATION/TRAINING PROGRAM

## 2025-01-03 PROCEDURE — 86140 C-REACTIVE PROTEIN: CPT | Performed by: STUDENT IN AN ORGANIZED HEALTH CARE EDUCATION/TRAINING PROGRAM

## 2025-01-03 PROCEDURE — 99999 PR PBB SHADOW E&M-EST. PATIENT-LVL III: CPT | Mod: PBBFAC,,, | Performed by: STUDENT IN AN ORGANIZED HEALTH CARE EDUCATION/TRAINING PROGRAM

## 2025-01-03 PROCEDURE — 86480 TB TEST CELL IMMUN MEASURE: CPT | Performed by: STUDENT IN AN ORGANIZED HEALTH CARE EDUCATION/TRAINING PROGRAM

## 2025-01-03 PROCEDURE — 85025 COMPLETE CBC W/AUTO DIFF WBC: CPT | Performed by: STUDENT IN AN ORGANIZED HEALTH CARE EDUCATION/TRAINING PROGRAM

## 2025-01-03 RX ORDER — TIRZEPATIDE 5 MG/.5ML
INJECTION, SOLUTION SUBCUTANEOUS
COMMUNITY
Start: 2024-03-01

## 2025-01-03 RX ORDER — ONDANSETRON 4 MG/1
4 TABLET, FILM COATED ORAL EVERY 8 HOURS PRN
COMMUNITY
Start: 2024-08-01

## 2025-01-03 RX ORDER — VENLAFAXINE HYDROCHLORIDE 150 MG/1
150 CAPSULE, EXTENDED RELEASE ORAL
COMMUNITY

## 2025-01-03 RX ORDER — VENLAFAXINE HYDROCHLORIDE 75 MG/1
75 CAPSULE, EXTENDED RELEASE ORAL
COMMUNITY

## 2025-01-03 RX ORDER — GLYCOPYRROLATE 1 MG/1
1 TABLET ORAL NIGHTLY
Qty: 30 TABLET | Refills: 0 | Status: SHIPPED | OUTPATIENT
Start: 2025-01-03 | End: 2025-02-02

## 2025-01-03 RX ORDER — VENLAFAXINE HYDROCHLORIDE 37.5 MG/1
37.5 CAPSULE, EXTENDED RELEASE ORAL
COMMUNITY
Start: 2024-12-11

## 2025-01-03 NOTE — PROGRESS NOTES
[facility]  Subjective:     Patient ID: Larry Madden is a 34 y.o. male.  History of Present Illness    CHIEF COMPLAINT:  Patient presents today for night sweats.    NIGHT SWEATS AND SLEEP:  He experiences severe night sweats regardless of environmental conditions, with the side where he sleeps being entirely soaked. He wakes up feeling dehydrated and off balance. He has attempted management by using a fan and setting room temperature to 67-68 degrees. He sleeps adequately with CPAP use.    ANXIETY:  He has been diagnosed with generalized anxiety disorder. and is on venlafaxine    GASTROINTESTINAL:  He reports GI issues p in March. He experienced two episodes of GI distress: one episode before Thanksgiving with hourly diarrhea for three days followed by intermittent diarrhea for two additional days resulting in severe dehydration, and another episode lasting 36 hours. He also notes prolonged burping episodes which he associates with Zepbound.    MEDICATIONS:  He takes finasteride 2.5mg every other day for hair loss and naltrexone for alcohol use disorder, which he reports helps with management.    TRAVEL HISTORY:  He traveled to [place] and [place] three months ago.     Has no history of incarceration or community living situation      Current Outpatient Medications   Medication Instructions    cloNIDine HCL 0.1 mg Tb12 1 tablet, Nightly    DUPIXENT  mg/2 mL PnIj Inject into the skin.    finasteride (PROSCAR) 2.5 mg, Oral, Every other day    glycopyrrolate (ROBINUL) 1 mg, Oral, Nightly    lamoTRIgine (LAMICTAL) 25 mg, Daily    naltrexone (DEPADE) 50 mg, Daily    ondansetron (ZOFRAN) 4 mg, Every 8 hours PRN    venlafaxine (EFFEXOR-XR) 150 mg    venlafaxine (EFFEXOR-XR) 37.5 mg    venlafaxine (EFFEXOR-XR) 75 mg    ZEPBOUND 5 mg/0.5 mL PnIj INJECT 5 MG UNDER THE SKIN WEEKLY     Objective:      Body mass index is 29.5 kg/m².  Vitals:    01/03/25 1046   BP: 111/78   Pulse: 83   SpO2:  "97%   Weight: 90.6 kg (199 lb 11.8 oz)   Height: 5' 9" (1.753 m)   PainSc: 0-No pain     Physical Exam  Constitutional:       Appearance: Normal appearance.   Cardiovascular:      Rate and Rhythm: Normal rate.      Heart sounds: No murmur heard.  Pulmonary:      Effort: Pulmonary effort is normal. No respiratory distress.      Breath sounds: No wheezing.   Abdominal:      General: Abdomen is flat. There is no distension.      Palpations: Abdomen is soft.   Lymphadenopathy:      Cervical: No cervical adenopathy.      Right cervical: No superficial cervical adenopathy.     Left cervical: No superficial cervical adenopathy.      Upper Body:      Right upper body: No supraclavicular or axillary adenopathy.      Left upper body: No supraclavicular or axillary adenopathy.   Neurological:      Mental Status: He is alert.        Physical Exam      Assessment:       1. Night sweats    2. Needs flu shot    3. Anxiety and depression        Plan:   Impression (dictated)   Plan (software generated and edited)   Assessment & Plan    IMPRESSION:  Seen today for night sweats. Low suspicion for chronic infection such as TB or autoimmune disorder such as lupus. Given lack of additional symptoms and risk factors, likely etiology is venlafaxine use. Discussed with the patient. I advised him to discuss this with his psychiatrist; and trial very low dose Robinol at night for symptoms. Discussed side effects of this medication. Obtain pertinent lab work to rule out other etiologies.    Patient expressed desire to transition primary care to here as this is more convenient location for him.          Night sweats  -     CBC W/ AUTO DIFFERENTIAL; Future; Expected date: 01/03/2025  -     QUANTIFERON GOLD TB; Future; Expected date: 01/03/2025  -     C-REACTIVE PROTEIN; Future; Expected date: 01/03/2025  -     Sedimentation rate; Future; Expected date: 01/03/2025  -     COMPREHENSIVE METABOLIC PANEL; Future; Expected date: 01/03/2025  -     TSH; " Future; Expected date: 01/03/2025  -     T4, FREE; Future; Expected date: 01/03/2025  -     glycopyrrolate (ROBINUL) 1 mg Tab; Take 1 tablet (1 mg total) by mouth every evening.  Dispense: 30 tablet; Refill: 0  -     HIV 1/2 Ag/Ab (4th Gen); Future; Expected date: 01/03/2025    Needs flu shot    Anxiety and depression        All of your core healthy metrics are met.    No follow-ups on file. or sooner prn (as needed)          Miguel Hunter  [facility]  [address] [address]  Phone [phone]  Fax [phone]    Part of this note is dictated using the M*Modal Fluency Direct word recognition program. It may contain word recognition mistakes or wrong word substitutions (commonly he/she and is/was substitutions) that were missed on review.    Part of this note was generated with the assistance of ambient listening technology. Verbal consent was obtained by the patient and accompanying visitor(s) for the recording of patient appointment to facilitate this note. I attest to having reviewed and edited the generated note for accuracy, though some syntax or spelling errors may persist. Please contact the author of this note for any clarification.

## 2025-01-04 LAB
GAMMA INTERFERON BACKGROUND BLD IA-ACNC: 0.01 IU/ML
M TB IFN-G CD4+ BCKGRND COR BLD-ACNC: 0.06 IU/ML
M TB IFN-G CD4+ BCKGRND COR BLD-ACNC: 0.08 IU/ML
MITOGEN IGNF BCKGRD COR BLD-ACNC: 9.99 IU/ML
TB GOLD PLUS: NEGATIVE

## 2025-01-06 ENCOUNTER — TELEPHONE (OUTPATIENT)
Dept: INTERNAL MEDICINE | Facility: CLINIC | Age: 35
End: 2025-01-06
Payer: COMMERCIAL

## 2025-01-06 NOTE — TELEPHONE ENCOUNTER
----- Message from Miguel Hunter MD sent at 1/3/2025  4:23 PM CST -----  Please call him to arrange the repeat CMP in 1-2 weeks  
Message left for patient to return my call.      ----- Message from Miguel Hunter MD sent at 1/3/2025  4:23 PM CST -----  Please call him to arrange the repeat CMP in 1-2 weeks  
Statement Selected

## 2025-01-10 ENCOUNTER — OFFICE VISIT (OUTPATIENT)
Dept: PULMONOLOGY | Facility: CLINIC | Age: 35
End: 2025-01-10
Payer: COMMERCIAL

## 2025-01-10 VITALS
HEIGHT: 69 IN | WEIGHT: 202.25 LBS | SYSTOLIC BLOOD PRESSURE: 112 MMHG | DIASTOLIC BLOOD PRESSURE: 80 MMHG | HEART RATE: 82 BPM | BODY MASS INDEX: 29.96 KG/M2 | OXYGEN SATURATION: 96 %

## 2025-01-10 DIAGNOSIS — J45.20 ASTHMA, INTERMITTENT, UNCOMPLICATED: ICD-10-CM

## 2025-01-10 DIAGNOSIS — G47.01 INSOMNIA DUE TO MEDICAL CONDITION: ICD-10-CM

## 2025-01-10 DIAGNOSIS — G47.33 OSA (OBSTRUCTIVE SLEEP APNEA): Primary | ICD-10-CM

## 2025-01-10 DIAGNOSIS — J30.9 CHRONIC ALLERGIC RHINITIS: ICD-10-CM

## 2025-01-10 PROBLEM — G47.00 INSOMNIA: Status: ACTIVE | Noted: 2025-01-10

## 2025-01-10 PROCEDURE — 99999 PR PBB SHADOW E&M-EST. PATIENT-LVL IV: CPT | Mod: PBBFAC,,, | Performed by: INTERNAL MEDICINE

## 2025-01-10 NOTE — PATIENT INSTRUCTIONS
Stimulus control  1. Go to bed only when sleepy AND after 11 pm   2. Do not watch television, read, eat, or worry while in bed. Use bed only for sleep and sex.   3. Get out of bed if unable to fall asleep within twenty minutes and go to another room.  Do something different like reading a book.  Dont engage in stimulating activity.  Return to bed only when you are sleepy.  Repeat this step as many times as necessary throughout the night.   4. Set an alarm clock to wake up at a fixed time each morning including weekends.  Wake up at 7 AM.  5. Do not take a nap during the day.        [facility]

## 2025-01-10 NOTE — PROGRESS NOTES
Larry Madden  was seen as a new patient to me for the evaluation of bryan.    CHIEF COMPLAINT:    Chief Complaint   Patient presents with    Apnea       HISTORY OF PRESENT ILLNESS: Larry Madden is a 34 y.o. male is here for sleep evaluation.   Patient was seen by HANNA Duque 1/5/21 with rdi of 6.  Patient was set up with apap.  Using apap most night.  Sleep is better with apap.  Still feeling tire upon awake.  No issue with apap.  Still feeling tire upon awake.      STOPBANG during initial visit with apap:    Snore:  denied  Tire:  tire upon awake.    Observed apnea:  denied  Pressure (HTN):  denied    BMI:  Body mass index is 29.87 kg/m².   Age:  34 y.o.  Neck (inch):  16  Gender:  male    Total STOP BANG score = 3/8  Low risk BRYAN: 0-2, Intermediate risk BRYAN: 3-4, High risk BRYAN: 5+    Other sleep ROS:  no parasomnia, no cataplexy.  No rls symptoms.  No sleep paralysis.     [employer] Sleepiness Scale score during initial sleep evaluation was 7.    SLEEP ROUTINE:  Activity the hour prior to sleep: work on computer and look at phone in bed    Bed partner:  alone  Time to bed:  11 pm   Lights off:  off  Sleep onset latency:  5-10 minutes        Disruptions or awakenings:    1 times (60 minutes difficulty going back to sleep)    Wakeup time:      7 am   Perceived sleep quality:  tire       Daytime naps:      once per week for an hour.  More rested  Weekend sleep routine:      12-1 am till 7-8 am (more rested)  Caffeine use: 2 cups per day   exercise habit:   denied      PAST MEDICAL HISTORY:    Active Ambulatory Problems     Diagnosis Date Noted    Sleep stage dysfunction     Hypersomnolence 04/30/2020    BRYAN (obstructive sleep apnea) 04/30/2020    Chronic allergic rhinitis 11/02/2020    On selective serotonin reuptake inhibitor (SSRI) therapy 11/02/2020    Deviated nasal septum 12/03/2020    Anxiety state 12/16/2020    Headache, unspecified 02/10/2021    Nasal obstruction 04/21/2022    Anxiety and depression  11/08/2018    Asthma, intermittent, uncomplicated 11/08/2018    Male pattern baldness 11/08/2018    Vitamin D deficiency 11/08/2018    Performance anxiety 11/08/2018    Other allergic rhinitis 11/08/2018    Allergic rhinitis due to pollen 07/23/2022    Atopic dermatitis 07/23/2022    Epistaxis 07/23/2022    Mixed anxiety and depressive disorder 07/23/2022    Mild intermittent asthma 07/23/2022    Allergic rhinitis due to allergen 07/23/2022    Impingement of right shoulder 09/06/2023    Decreased strength of upper extremity 09/06/2023    Radicular pain in left arm 09/06/2023    Posture imbalance 09/06/2023    Insomnia 01/10/2025     Resolved Ambulatory Problems     Diagnosis Date Noted    No Resolved Ambulatory Problems     Past Medical History:   Diagnosis Date    Anxiety     Asthma                 PAST SURGICAL HISTORY:    Past Surgical History:   Procedure Laterality Date    APPLICATION OF CARTILAGE GRAFT  4/21/2022    Procedure: APPLICATION, CARTILAGE GRAFT;  Surgeon: Rg Orantes III, MD;  Location: [facility];  Service: ENT;;    EXCISION TURBINATE, SUBMUCOUS      NASAL RECONSTRUCTION N/A 4/21/2022    Procedure: RECONSTRUCTION, NOSE;  Surgeon: Rg Orantes III, MD;  Location: [facility];  Service: ENT;  Laterality: N/A;    NASAL SEPTOPLASTY Bilateral 12/3/2020    Procedure: SEPTOPLASTY, NOSE;  Surgeon: Rg Orantes III, MD;  Location: [facility] OR;  Service: Plastics;  Laterality: Bilateral;    NASAL SEPTOPLASTY N/A 4/21/2022    Procedure: SEPTOPLASTY, NOSE;  Surgeon: Rg Orantes III, MD;  Location: [facility] OR;  Service: ENT;  Laterality: N/A;    NASAL STENOSIS REPAIR Bilateral 12/3/2020    Procedure: REPAIR, STENOSIS, NOSE, VESTIBULE;  Surgeon: Rg Orantes III, MD;  Location: [facility] OR;  Service: Plastics;  Laterality: Bilateral;  Nasal reconstruction with osteotomies and revision / FOLLOW DR ELLISON ANESTHESIA PROTOCAL     23 hour admit GIVOANNA    NASAL TURBINATE REDUCTION  4/21/2022    Procedure: REDUCTION,  NASAL TURBINATE;  Surgeon: Rg Orantes III, MD;  Location: [facility];  Service: ENT;;    SHOULDER ARTHROSCOPY W/ LABRAL REPAIR      SINUS SURGERY           FAMILY HISTORY:                Family History   Problem Relation Name Age of Onset    Depression Mother      Arthritis Father      Cirrhosis Neg Hx         SOCIAL HISTORY:          Tobacco:   Social History     Tobacco Use   Smoking Status Never   Smokeless Tobacco Never       alcohol use:    Social History     Substance and Sexual Activity   Alcohol Use Yes                 Occupation:    ALLERGIES:  Review of patient's allergies indicates:  No Known Allergies    CURRENT MEDICATIONS:    Current Outpatient Medications   Medication Sig Dispense Refill    cloNIDine HCL 0.1 mg Tb12 Take 1 tablet by mouth every evening.      DUPIXENT  mg/2 mL PnIj Inject into the skin.      finasteride (PROSCAR) 5 mg tablet Take 0.5 tablets (2.5 mg total) by mouth every other day. 15 tablet 12    glycopyrrolate (ROBINUL) 1 mg Tab Take 1 tablet (1 mg total) by mouth every evening. 30 tablet 0    lamoTRIgine (LAMICTAL) 25 MG tablet Take 25 mg by mouth once daily.      naltrexone (DEPADE) 50 mg tablet Take 50 mg by mouth once daily.      ondansetron (ZOFRAN) 4 MG tablet Take 4 mg by mouth every 8 (eight) hours as needed.      venlafaxine (EFFEXOR-XR) 150 MG Cp24 Take 150 mg by mouth.      venlafaxine (EFFEXOR-XR) 37.5 MG 24 hr capsule Take 37.5 mg by mouth.      venlafaxine (EFFEXOR-XR) 75 MG 24 hr capsule Take 75 mg by mouth.      ZEPBOUND 5 mg/0.5 mL PnIj INJECT 5 MG UNDER THE SKIN WEEKLY       No current facility-administered medications for this visit.                  REVIEW OF SYSTEMS:     Sleep related symptoms as per HPI.  CONST:Denies weight gain; losing weight with zepbounds   HEENT: + sinus congestion s/p septoplasty and dupixent  PULM: Denies dyspnea  CARD:  Denies palpitations   GI:  Denies acid reflux  : Denies polyuria  NEURO: Denies  "headaches  PSYCH: anxiety treated with effexor  HEME: Denies anemia   Otherwise, a balance of systems reviewed is negative.          PHYSICAL EXAM:  Vitals:    01/10/25 1104   BP: 112/80   Pulse: 82   SpO2: 96%   Weight: 91.7 kg (202 lb 4.4 oz)   Height: 5' 9" (1.753 m)   PainSc: 0-No pain     Body mass index is 29.87 kg/m².     GENERAL: Normal development, well groomed  HEENT:  Conjunctivae are non-erythematous; Pupils equal, round, and reactive to light; Nose is symmetrical; Nasal mucosa is pink and moist; Septum is midline; Inferior turbinates are normal; Nasal airflow is normal; Posterior pharynx is pink; Modified Mallampati: 3; Posterior palate is normal; Tonsils +1; Uvula is normal and pink;Tongue is normal; Dentition is fair; No TMJ tenderness; Jaw opening and protrusion without click and without discomfort.  NECK: Supple. Neck circumference is 16 inches. No thyromegaly. No palpable nodes.     SKIN: On face and neck: No abrasions, no rashes, no lesions.  No subcutaneous nodules are palpable.  RESPIRATORY: Chest is clear to auscultation.  Normal chest expansion and non-labored breathing at rest.  CARDIOVASCULAR: Normal S1, S2.  No murmurs, gallops or rubs. No carotid bruits bilaterally.  EXTREMITIES: No edema. No clubbing. No cyanosis. Station normal. Gait normal.        NEURO/PSYCH: Oriented to time, place and person. Normal attention span and concentration. Affect is full. Mood is normal.                                              DATA   Hsat 1/5/21 rdi of 6  PSG 1/28/21: AHI 6.7     Lab Results   Component Value Date    TSH 0.556 01/03/2025       ASSESSMENT/PLAN    Problem List Items Addressed This Visit       Asthma, intermittent, uncomplicated    Overview     followed by allergy, Dr. Sarah Recinos, at [facility]         Chronic allergic rhinitis    Overview     dupixent with better control.           Insomnia    Overview     maintenance is the issue.  Psychophysiologic vs high leak a/w " apap.    Stimulus control d/w patient.  Avoid excessive time in bed.  No iphone in bed.  Advise patient to download insomnia            GIOVANNA (obstructive sleep apnea) - Primary    Overview     Ahi of 6  Currently with resmed apap 7-14  Residual ahi of 4.  High leak value 56-95 lpm.  Recommend to switch to nasal mask for better seal.  Advise patient to bring apap to next clinic visit.            Relevant Orders    CPAP/BIPAP SUPPLIES         Education: During our discussion today, we talked about the etiology of obstructive sleep apnea as well as the potential ramifications of untreated sleep apnea, which could include daytime sleepiness, hypertension, heart disease and/or stroke.     Precautions: The patient was advised to abstain from driving should they feel sleepy or drowsy.         Patient will No follow-ups on file.        40 minutes of total time spent on the encounter, which includes face to face time and non-face to face time preparing to see the patient (eg, review of tests), Obtaining and/or reviewing separately obtained history, documenting clinical information in the electronic or other health record, independently interpreting results (not separately reported) and communicating results to the patient/family/caregiver, or Care coordination (not separately reported).

## 2025-01-31 DIAGNOSIS — R61 NIGHT SWEATS: ICD-10-CM

## 2025-01-31 RX ORDER — GLYCOPYRROLATE 1 MG/1
1 TABLET ORAL NIGHTLY
Qty: 30 TABLET | Refills: 0 | Status: SHIPPED | OUTPATIENT
Start: 2025-01-31 | End: 2025-03-02

## 2025-01-31 NOTE — TELEPHONE ENCOUNTER
No care due was identified.  [facility] Embedded Care Due Messages. Reference number: [id number].   1/31/2025 2:43:54 PM CST

## 2025-01-31 NOTE — TELEPHONE ENCOUNTER
Refill Routing Note   Medication(s) are not appropriate for processing by [facility] for the following reason(s):        New or recently adjusted medication    [facility] action(s):  Defer             Appointments  past 12m or future 3m with PCP    Date Provider   Last Visit   1/3/2025 Mgiuel Hunter MD   Next Visit   Visit date not found Miguel Hunter MD   ED visits in past 90 days: 0        Note composed:3:30 PM 01/31/2025

## 2025-03-12 ENCOUNTER — OFFICE VISIT (OUTPATIENT)
Dept: INTERNAL MEDICINE | Facility: CLINIC | Age: 35
End: 2025-03-12
Payer: COMMERCIAL

## 2025-03-12 VITALS
HEART RATE: 84 BPM | BODY MASS INDEX: 30.5 KG/M2 | OXYGEN SATURATION: 97 % | WEIGHT: 205.94 LBS | HEIGHT: 69 IN | DIASTOLIC BLOOD PRESSURE: 70 MMHG | SYSTOLIC BLOOD PRESSURE: 116 MMHG

## 2025-03-12 DIAGNOSIS — F41.8 MIXED ANXIETY AND DEPRESSIVE DISORDER: ICD-10-CM

## 2025-03-12 DIAGNOSIS — J45.20 MILD INTERMITTENT ASTHMA WITHOUT COMPLICATION: ICD-10-CM

## 2025-03-12 DIAGNOSIS — Z00.00 ANNUAL PHYSICAL EXAM: Primary | ICD-10-CM

## 2025-03-12 DIAGNOSIS — L64.9 MALE PATTERN BALDNESS: ICD-10-CM

## 2025-03-12 DIAGNOSIS — M54.6 DORSALGIA OF CERVICOTHORACIC REGION: ICD-10-CM

## 2025-03-12 DIAGNOSIS — J30.9 CHRONIC ALLERGIC RHINITIS: ICD-10-CM

## 2025-03-12 DIAGNOSIS — R74.8 ELEVATED LIVER ENZYMES: ICD-10-CM

## 2025-03-12 DIAGNOSIS — G47.33 OSA (OBSTRUCTIVE SLEEP APNEA): ICD-10-CM

## 2025-03-12 DIAGNOSIS — T50.905A ADVERSE EFFECT OF DRUG, INITIAL ENCOUNTER: ICD-10-CM

## 2025-03-12 DIAGNOSIS — M54.2 DORSALGIA OF CERVICOTHORACIC REGION: ICD-10-CM

## 2025-03-12 DIAGNOSIS — Z78.9 ALCOHOL USE: ICD-10-CM

## 2025-03-12 PROBLEM — Z79.899 ON SELECTIVE SEROTONIN REUPTAKE INHIBITOR (SSRI) THERAPY: Status: RESOLVED | Noted: 2020-11-02 | Resolved: 2025-03-12

## 2025-03-12 PROBLEM — J30.89 OTHER ALLERGIC RHINITIS: Status: RESOLVED | Noted: 2018-11-08 | Resolved: 2025-03-12

## 2025-03-12 PROBLEM — J30.1 ALLERGIC RHINITIS DUE TO POLLEN: Status: RESOLVED | Noted: 2022-07-23 | Resolved: 2025-03-12

## 2025-03-12 PROBLEM — M79.2 RADICULAR PAIN IN LEFT ARM: Status: RESOLVED | Noted: 2023-09-06 | Resolved: 2025-03-12

## 2025-03-12 PROBLEM — R51.9 HEADACHE, UNSPECIFIED: Status: RESOLVED | Noted: 2021-02-10 | Resolved: 2025-03-12

## 2025-03-12 PROBLEM — F41.1 ANXIETY STATE: Status: RESOLVED | Noted: 2020-12-16 | Resolved: 2025-03-12

## 2025-03-12 PROBLEM — R04.0 EPISTAXIS: Status: RESOLVED | Noted: 2022-07-23 | Resolved: 2025-03-12

## 2025-03-12 PROBLEM — F32.A ANXIETY AND DEPRESSION: Status: RESOLVED | Noted: 2018-11-08 | Resolved: 2025-03-12

## 2025-03-12 PROBLEM — F41.9 ANXIETY AND DEPRESSION: Status: RESOLVED | Noted: 2018-11-08 | Resolved: 2025-03-12

## 2025-03-12 PROBLEM — J34.89 NASAL OBSTRUCTION: Status: RESOLVED | Noted: 2022-04-21 | Resolved: 2025-03-12

## 2025-03-12 PROBLEM — G47.10 HYPERSOMNOLENCE: Status: RESOLVED | Noted: 2020-04-30 | Resolved: 2025-03-12

## 2025-03-12 PROCEDURE — 99999 PR PBB SHADOW E&M-EST. PATIENT-LVL V: CPT | Mod: PBBFAC,,, | Performed by: STUDENT IN AN ORGANIZED HEALTH CARE EDUCATION/TRAINING PROGRAM

## 2025-03-12 PROCEDURE — 99214 OFFICE O/P EST MOD 30 MIN: CPT | Mod: 25,S$GLB,, | Performed by: STUDENT IN AN ORGANIZED HEALTH CARE EDUCATION/TRAINING PROGRAM

## 2025-03-12 PROCEDURE — 99395 PREV VISIT EST AGE 18-39: CPT | Mod: S$GLB,,, | Performed by: STUDENT IN AN ORGANIZED HEALTH CARE EDUCATION/TRAINING PROGRAM

## 2025-03-12 RX ORDER — FINASTERIDE 5 MG/1
2.5 TABLET, FILM COATED ORAL EVERY OTHER DAY
Qty: 15 TABLET | Refills: 12 | Status: SHIPPED | OUTPATIENT
Start: 2025-03-12

## 2025-03-12 RX ORDER — MINOXIDIL 2.5 MG/1
2.5 TABLET ORAL DAILY
Qty: 90 TABLET | Refills: 3 | Status: SHIPPED | OUTPATIENT
Start: 2025-03-12 | End: 2026-03-12

## 2025-03-12 NOTE — PROGRESS NOTES
SUBJECTIVE     Chief Complaint   Patient presents with    Annual Exam       HPI  Larry Madden is a 35 y.o. male with  mild intermittent asthma, GIOVANNA, chronic allergic rhinitis, mixed anxiety & depressive disorder  that presents for annual exam.     MEDICATIONS:  He is currently taking:  - Venlafaxine (switched from Luvox 4-5 months ago with no perceived difference in effect)  - Clonidine nightly  - Dupixant for eczema and asthma  - Lamotrigine for anxiety  - Naltrexone 50mg daily for alcohol use reduction  - Zepbound 5mg for weight loss for past 9 months (administered via thigh injections)  - Finasteride 25mg for hair loss for approximately 10 years, requesting another medication to help with hair regrowth; interested in minoxidil.     MEDICATION SIDE EFFECTS:  He reports side effects from Zepbound including sulfur burps, diarrhea, and vomiting approximately every fourth dose. He reports injecting medication in the side of his thigh, alternating between thighs weekly.     ALCOHOL USE:  He consumes alcohol once every two weeks, averaging 2-3 drinks per occasion, with last use the previous night. He reports Naltrexone is not effectively helping with alcohol use reduction.    SLEEP:  He continues to use CPAP machine.    MUSCULOSKELETAL:  He is receiving physical therapy for neck and upper back pain related to computer work.           PAST MEDICAL HISTORY:  Past Medical History:   Diagnosis Date    Anxiety     Asthma        PAST SURGICAL HISTORY:  Past Surgical History:   Procedure Laterality Date    APPLICATION OF CARTILAGE GRAFT  4/21/2022    Procedure: APPLICATION, CARTILAGE GRAFT;  Surgeon: Rg Orantes III, MD;  Location: [facility];  Service: ENT;;    EXCISION TURBINATE, SUBMUCOUS      NASAL RECONSTRUCTION N/A 4/21/2022    Procedure: RECONSTRUCTION, NOSE;  Surgeon: Rg Orantes III, MD;  Location: [facility] OR;  Service: ENT;  Laterality: N/A;    NASAL SEPTOPLASTY Bilateral 12/3/2020    Procedure:  SEPTOPLASTY, NOSE;  Surgeon: Rg Orantes III, MD;  Location: [facility];  Service: Plastics;  Laterality: Bilateral;    NASAL SEPTOPLASTY N/A 4/21/2022    Procedure: SEPTOPLASTY, NOSE;  Surgeon: Rg Orantes III, MD;  Location: [facility];  Service: ENT;  Laterality: N/A;    NASAL STENOSIS REPAIR Bilateral 12/3/2020    Procedure: REPAIR, STENOSIS, NOSE, VESTIBULE;  Surgeon: Rg Orantes III, MD;  Location: [facility];  Service: Plastics;  Laterality: Bilateral;  Nasal reconstruction with osteotomies and revision / FOLLOW DR ELLISON ANESTHESIA PROTOCAL     23 hour admit GIOVANNA    NASAL TURBINATE REDUCTION  4/21/2022    Procedure: REDUCTION, NASAL TURBINATE;  Surgeon: Rg Orantes III, MD;  Location: [facility];  Service: ENT;;    SHOULDER ARTHROSCOPY W/ LABRAL REPAIR      SINUS SURGERY         FAMILY HISTORY:  Family History   Problem Relation Name Age of Onset    Depression Mother      Arthritis Father      Cirrhosis Neg Hx         ALLERGIES AND MEDICATIONS: updated and reviewed.  Review of patient's allergies indicates:  No Known Allergies  Current Medications[1]    ROS  Review of Systems   Constitutional:  Negative for activity change, chills and fever.   HENT:  Negative for congestion and hearing loss.    Eyes:  Negative for pain and visual disturbance.   Respiratory:  Negative for cough and shortness of breath.    Cardiovascular:  Negative for chest pain and palpitations.   Gastrointestinal:  Negative for abdominal pain, constipation, diarrhea, nausea and vomiting.   Endocrine: Negative.    Genitourinary: Negative.    Musculoskeletal:  Positive for back pain. Negative for arthralgias and myalgias.   Skin: Negative.    Allergic/Immunologic: Negative.    Neurological:  Negative for dizziness, light-headedness and headaches.   Hematological: Negative.          OBJECTIVE     Physical Exam  Vitals:    03/12/25 0956   BP: 116/70   Pulse: 84    Body mass index is 30.41 kg/m².  Weight: 93.4 kg (205 lb 14.6 oz)   Height:  "5' 9" (175.3 cm)     Physical Exam  Vitals reviewed.   Constitutional:       General: He is not in acute distress.     Appearance: Normal appearance.   HENT:      Head: Normocephalic and atraumatic.      Mouth/Throat:      Mouth: Mucous membranes are moist.      Pharynx: Oropharynx is clear.   Eyes:      Extraocular Movements: Extraocular movements intact.      Conjunctiva/sclera: Conjunctivae normal.      Pupils: Pupils are equal, round, and reactive to light.   Cardiovascular:      Rate and Rhythm: Normal rate and regular rhythm.      Pulses: Normal pulses.      Heart sounds: Normal heart sounds.   Pulmonary:      Effort: Pulmonary effort is normal.      Breath sounds: Normal breath sounds.   Abdominal:      General: There is no distension.   Musculoskeletal:         General: Normal range of motion.      Cervical back: Normal range of motion and neck supple.   Skin:     General: Skin is warm and dry.   Neurological:      General: No focal deficit present.      Mental Status: He is alert.           ASSESSMENT     35 y.o. male with     1. Annual physical exam    2. Mixed anxiety and depressive disorder    3. Chronic allergic rhinitis    4. Male pattern baldness    5. GIOVANNA (obstructive sleep apnea)    6. Mild intermittent asthma without complication    7. Elevated liver enzymes    8. Dorsalgia of cervicothoracic region    9. Adverse effect of drug, initial encounter    10. Alcohol use        PLAN:     1. Annual physical exam  - Discussed age and gender appropriate screenings at this visit and encouraged a healthy diet low in simple carbohydrates, and increased physical activity.  Counseled on medically appropriate vaccines based on age and current health condition.  Screening test reviewed and discussed with patient.     2. Mixed anxiety and depressive disorder  - Stable on Venlafaxine, Clonidine, Lamictal as managed by his psychiatrist. Continue follow up.   - Order pharmacogenetics panel as patient does not feel " that medications are as effective as he would like.   - Pharmacogenomics Panel; Future    3. Chronic allergic rhinitis  - Stable on Dupixent. Continue.     4. Male pattern baldness  - Continue finasteride. Start oral minoxidil. Counseled patient on safe and effective use of new medication, including common adverse effects. Patient verbalized understanding.   - finasteride (PROSCAR) 5 mg tablet; Take 0.5 tablets (2.5 mg total) by mouth every other day.  Dispense: 15 tablet; Refill: 12  - minoxidiL (LONITEN) 2.5 MG tablet; Take 1 tablet (2.5 mg total) by mouth once daily.  Dispense: 90 tablet; Refill: 3    5. GIOVANNA (obstructive sleep apnea)  - Stable on CPAP, continue use.     6. Mild intermittent asthma without complication  - Stable on Dupixent. Continue.     7. Elevated liver enzymes  - Recheck liver enzymes, recommend abstaining from alcohol at least 2 days prior to recheck. If still elevated, obtain abdominal ultrasound.   - Comprehensive Metabolic Panel; Future    8. Dorsalgia of cervicothoracic region  - Ambulatory Referral/Consult to Physical/Occupational Therapy; Future    9. Adverse effect of drug, initial encounter  - From Zepbound. Recommended injected medication into the anterior thigh, abdomen 2 inches from navel, or posterior upper arm as recommended by the . Patient verbalized understanding.   - Recommended use of Zofran on day of administering medication. Offered to send Rx for Zofran, but patient states he has this medication at home already.   -  Recommended OTC Pepcid and eating more frequent, smaller meals for the belching.     10. Alcohol use  - On Naltrexone, but patient does not think it is helpful. His current use is not excessive, but would recommend that he follow up with psychiatrist to discuss possible alternatives.       RTC in 1 year, earlier PRN       Ridge Young MD  Family Medicine  [facility]  03/12/2025    This note was generated with  the assistance of ambient listening technology. Verbal consent was obtained by the patient and accompanying visitor(s) for the recording of patient appointment to facilitate this note. I attest to having reviewed and edited the generated note for accuracy, though some syntax or spelling errors may persist. Please contact the author of this note for any clarification.      No follow-ups on file.                       [1]   Current Outpatient Medications   Medication Sig Dispense Refill    cloNIDine HCL 0.1 mg Tb12 Take 1 tablet by mouth every evening.      DUPIXENT  mg/2 mL PnIj Inject into the skin.      finasteride (PROSCAR) 5 mg tablet Take 0.5 tablets (2.5 mg total) by mouth every other day. 15 tablet 12    lamoTRIgine (LAMICTAL) 25 MG tablet Take 25 mg by mouth once daily.      naltrexone (DEPADE) 50 mg tablet Take 50 mg by mouth once daily.      venlafaxine (EFFEXOR-XR) 150 MG Cp24 Take 150 mg by mouth.      venlafaxine (EFFEXOR-XR) 37.5 MG 24 hr capsule Take 37.5 mg by mouth.      ZEPBOUND 5 mg/0.5 mL PnIj INJECT 5 MG UNDER THE SKIN WEEKLY      ondansetron (ZOFRAN) 4 MG tablet Take 4 mg by mouth every 8 (eight) hours as needed.      venlafaxine (EFFEXOR-XR) 75 MG 24 hr capsule Take 75 mg by mouth.       No current facility-administered medications for this visit.

## 2025-03-13 ENCOUNTER — LAB VISIT (OUTPATIENT)
Dept: LAB | Facility: OTHER | Age: 35
End: 2025-03-13
Attending: STUDENT IN AN ORGANIZED HEALTH CARE EDUCATION/TRAINING PROGRAM
Payer: COMMERCIAL

## 2025-03-13 DIAGNOSIS — F41.8 MIXED ANXIETY AND DEPRESSIVE DISORDER: ICD-10-CM

## 2025-03-13 PROCEDURE — 36415 COLL VENOUS BLD VENIPUNCTURE: CPT | Performed by: STUDENT IN AN ORGANIZED HEALTH CARE EDUCATION/TRAINING PROGRAM

## 2025-03-21 LAB
ONEOME COMMENT: NORMAL
ONEOME METHOD: NORMAL

## 2025-04-01 ENCOUNTER — RESULTS FOLLOW-UP (OUTPATIENT)
Dept: INTERNAL MEDICINE | Facility: CLINIC | Age: 35
End: 2025-04-01

## 2025-04-17 ENCOUNTER — HOSPITAL ENCOUNTER (OUTPATIENT)
Dept: RADIOLOGY | Facility: OTHER | Age: 35
Discharge: HOME | End: 2025-04-17
Payer: COMMERCIAL

## 2025-04-17 ENCOUNTER — OFFICE VISIT (OUTPATIENT)
Dept: GASTROENTEROLOGY | Facility: CLINIC | Age: 35
End: 2025-04-17
Payer: COMMERCIAL

## 2025-04-17 VITALS
BODY MASS INDEX: 29.98 KG/M2 | HEIGHT: 69 IN | WEIGHT: 202.38 LBS | DIASTOLIC BLOOD PRESSURE: 83 MMHG | SYSTOLIC BLOOD PRESSURE: 115 MMHG | HEART RATE: 81 BPM

## 2025-04-17 DIAGNOSIS — R10.30 LOWER ABDOMINAL PAIN: ICD-10-CM

## 2025-04-17 DIAGNOSIS — K59.00 CONSTIPATION, UNSPECIFIED CONSTIPATION TYPE: Primary | ICD-10-CM

## 2025-04-17 PROCEDURE — 99204 OFFICE O/P NEW MOD 45 MIN: CPT | Mod: S$GLB,,,

## 2025-04-17 PROCEDURE — 25500020 PHARM REV CODE 255

## 2025-04-17 PROCEDURE — 74177 CT ABD & PELVIS W/CONTRAST: CPT | Mod: 26,,, | Performed by: RADIOLOGY

## 2025-04-17 PROCEDURE — 99999 PR PBB SHADOW E&M-EST. PATIENT-LVL III: CPT | Mod: PBBFAC,,,

## 2025-04-17 PROCEDURE — 74177 CT ABD & PELVIS W/CONTRAST: CPT | Mod: TC

## 2025-04-17 RX ADMIN — IOHEXOL 100 ML: 350 INJECTION, SOLUTION INTRAVENOUS at 07:04

## 2025-04-17 NOTE — PROGRESS NOTES
"Gastroenterology Clinic Consultation Note    Reason for Visit:  The primary encounter diagnosis was Constipation, unspecified constipation type. A diagnosis of Lower abdominal pain was also pertinent to this visit.    PCP:   Miguel Hunter       Initial HPI   This is a 35 y.o. male with a past medical history of Anxiety and Asthma presenting for constipation with abdominal pain.     Began experiencing severe abdominal pains the last three days. On Tuesday, experienced excruciating pains mostly in the RLQ he describes as "sharp." Says this was mostly constant. This also presented with more severe issues of constipation.   Typically produces a bowel movement twice a week since starting zepbound. Describes stool consistency as mostly hard, pebble like. Reports straining. Does not feel like he is producing complete bowel movements. Does also experience occasionally watery diarrhea following formed movements. Reports tmax of 102.0- this was on Tuesday. Last temp was yesterday, reports low grade.   Prior to starting GLP-1 was having daily Bms.   Today he feels like abdominal pains somewhat subsided rating it as 2-3/10 whereas on Tuesday he would rate a 6/10  Does experience nausea with occasional vomiting episodes every few weeks since taking Zepbound  Denies blood in stool  Denies family hx of GI cancers.     Has not tried anything OTC for constipation or abdominal pain.     Zepbound 5mg for weight loss for past 9 months. Has not taken this in 2 weeks.     He consumes alcohol once every two weeks, averaging 2-3 drinks per occasion. On Naltrexone.      Elevated liver enzymes detected on previous lab workup. Being managed by his PCP for this. Seems to be on downward trend on more recent labs. Plans to follow up with abdominal ultrasound if levels increase.     Denies any dysphagia, odynophagia, heartburn or acid regurgitation. No blood/ mucus in stool or unintentional weight loss. Nocturnal symptoms. No melena or " "maroon stools. No recent changes in diet. No family history of IBD, Celiac disease or GI malignancy. No regular NSAIDs (none). No alcohol (social) or tobacco (none) use. No recent antibiotic use, travels or sick contacts. No prior history of C.diff.    Abdominal Surgeries: None    ROS:  Review of Systems   Constitutional:  Negative for chills, fever, malaise/fatigue and weight loss.   Respiratory:  Negative for cough, hemoptysis, sputum production, shortness of breath and wheezing.    Cardiovascular:  Negative for chest pain, palpitations, orthopnea, claudication, leg swelling and PND.   Gastrointestinal:  Positive for abdominal pain, constipation, nausea and vomiting. Negative for blood in stool, diarrhea, heartburn and melena.   Genitourinary:  Negative for dysuria, flank pain, frequency, hematuria and urgency.   Musculoskeletal:  Negative for back pain, falls, joint pain, myalgias and neck pain.   Skin:  Negative for itching and rash.   Neurological:  Negative for dizziness, seizures, loss of consciousness, weakness and headaches.   Psychiatric/Behavioral:  The patient is not nervous/anxious and does not have insomnia.       Medical History:  has a past medical history of Anxiety and Asthma.    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    Family History: family history includes Arthritis in his father; Depression in his mother..     Review of patient's allergies indicates:  No Known Allergies    Medications Ordered Prior to Encounter[1]    Objective Findings:    Vital Signs:  /83 (BP Location: Right arm, Patient Position: Sitting)   Pulse 81   Ht 5' 9" " (1.753 m)   Wt 91.8 kg (202 lb 6.1 oz)   BMI 29.89 kg/m²   Body mass index is 29.89 kg/m².    Physical Exam  Constitutional:       Appearance: Normal appearance. He is normal weight.   HENT:      Head: Normocephalic and atraumatic.      Mouth/Throat:      Mouth: Mucous membranes are moist.      Pharynx: Oropharynx is clear.   Cardiovascular:      Rate and Rhythm: Normal rate and regular rhythm.      Pulses: Normal pulses.      Heart sounds: Normal heart sounds.   Pulmonary:      Effort: Pulmonary effort is normal.      Breath sounds: Normal breath sounds.   Abdominal:      General: Abdomen is flat. Bowel sounds are normal.      Palpations: Abdomen is soft.   Musculoskeletal:         General: Normal range of motion.      Cervical back: Normal range of motion and neck supple.   Skin:     General: Skin is warm and dry.   Neurological:      General: No focal deficit present.      Mental Status: He is alert and oriented to person, place, and time. Mental status is at baseline.   Psychiatric:         Mood and Affect: Mood normal.         Behavior: Behavior normal.         Thought Content: Thought content normal.         Judgment: Judgment normal.       Labs:  Lab Results   Component Value Date    WBC 6.70 01/03/2025    HGB 15.2 01/03/2025    HCT 43.8 01/03/2025     01/03/2025    CRP 2.7 01/03/2025    CHOL 147 03/06/2024    TRIG 58 03/06/2024    HDL 46 03/06/2024    ALKPHOS 64 01/03/2025    LIPASE 10 03/04/2007    ALT 63 (H) 03/18/2025    AST 26 03/18/2025     03/18/2025    K 4.6 03/18/2025     03/18/2025    CREATININE 0.82 03/18/2025    BUN 13 03/18/2025    CO2 25 03/18/2025    TSH 0.556 01/03/2025    HGBA1C 5.2 03/06/2024     Imaging reviewed:   No recent/relevant GI imaging performed for review.     Endoscopy reviewed:   Never done    Assessment:  1. Constipation, unspecified constipation type    2. Lower abdominal pain      Orders Placed This Encounter    CT Abdomen Pelvis With IV Contrast NO Oral  Contrast     Plan:  CT Abdomen/Pelvis WITH IV Contrast for rule out of any underlying acute concerns however I find his symptoms more consistent with severe constipation.   2. Recommend daily fiber supplement (Metamucil, Benefiber, Citrucel). Encouraged patient to increase water intake for max effects.   Start daily fiber. Take 1 tsp of fiber powder (psyllium or other sugar-free powder).  Mix in 8 oz of water.  Take x 3-5 days.  Then, increase fiber by 1 tsp every 3-5 days until stool is easy to pass.  Stop and continue at that dose.   Do not exceed 6 tsps/day. GOAL:  More well-formed stool (one continuous well-formed piece vs. Pellets) and minimize straining with initiation. Can cause increased gas.  3. Start miralax daily (17g per dose). Start at once per day and can titrate up to twice daily. Decrease and/or stop Miralax if stools become softer/liquid in consistency.   4. ER precautions discussed including return of severe abdominal pains in the setting of persistent nausea/vomiting and return of fever. Patient verbalized understanding.  5. F/u with GI in 3 months. Will see how patient has responded to fiber and miralax. If this does not seem to help, we can discuss rx medication for constipation.       Thank you for allowing me to participate in this patient's care.    Sincerely,     DARREN KABA  Gastroenterology Department  [facility]          [1]   Current Outpatient Medications on File Prior to Visit   Medication Sig Dispense Refill    cloNIDine HCL 0.1 mg Tb12 Take 1 tablet by mouth every evening.      DUPIXENT  mg/2 mL PnIj Inject into the skin.      finasteride (PROSCAR) 5 mg tablet Take 0.5 tablets (2.5 mg total) by mouth every other day. 15 tablet 12    lamoTRIgine (LAMICTAL) 25 MG tablet Take 25 mg by mouth once daily.      minoxidiL (LONITEN) 2.5 MG tablet Take 1 tablet (2.5 mg total) by mouth once daily. 90 tablet 3    naltrexone (DEPADE) 50 mg tablet Take 50 mg by  mouth once daily.      venlafaxine (EFFEXOR-XR) 150 MG Cp24 Take 150 mg by mouth.      venlafaxine (EFFEXOR-XR) 37.5 MG 24 hr capsule Take 37.5 mg by mouth.      ZEPBOUND 5 mg/0.5 mL PnIj INJECT 5 MG UNDER THE SKIN WEEKLY       No current facility-administered medications on file prior to visit.

## 2025-04-17 NOTE — PATIENT INSTRUCTIONS
Start daily fiber (Metamucil, Benefiber, Citrucel). Drink plenty of water with this for max effects as fiber can be more constipating if not taking correctly. Take 1 tsp of fiber powder (psyllium or other sugar-free powder).  Mix in 8 oz of water.  Take x 3-5 days.  Then, increase fiber by 1 tsp every 3-5 days until stool is easy to pass.  Stop and continue at that dose.   Do not exceed 6 tsps/day. GOAL:  More well-formed stool (one continuous well-formed piece vs. Pellets) and minimize straining with initiation. Can cause increased gas.     Start miralax daily (17g per dose). Start at once per day and can titrate up to twice daily. Decrease and/or stop Miralax if stools become softer/liquid in consistency.

## 2025-04-22 ENCOUNTER — RESULTS FOLLOW-UP (OUTPATIENT)
Dept: GASTROENTEROLOGY | Facility: CLINIC | Age: 35
End: 2025-04-22
Payer: COMMERCIAL

## 2025-07-24 ENCOUNTER — TELEPHONE (OUTPATIENT)
Dept: GASTROENTEROLOGY | Facility: CLINIC | Age: 35
End: 2025-07-24
Payer: COMMERCIAL

## 2025-07-24 ENCOUNTER — OFFICE VISIT (OUTPATIENT)
Dept: GASTROENTEROLOGY | Facility: CLINIC | Age: 35
End: 2025-07-24
Payer: COMMERCIAL

## 2025-07-24 DIAGNOSIS — K59.01 SLOW TRANSIT CONSTIPATION: Primary | ICD-10-CM

## 2025-07-24 DIAGNOSIS — R10.30 LOWER ABDOMINAL PAIN: ICD-10-CM

## 2025-07-24 NOTE — PATIENT INSTRUCTIONS
Continue daily fiber. Take 1 tsp of fiber powder (psyllium or other sugar-free powder).  Mix in 8 oz of water.  Take x 3-5 days.  Then, increase fiber by 1 tsp every 3-5 days until stool is easy to pass.  Stop and continue at that dose.   Do not exceed 6 tsps/day. GOAL:  More well-formed stool (one continuous well-formed piece vs. Pellets) and minimize straining with initiation. Can cause increased gas.     Can add miralax daily (17g per dose) if needed. Start at once per day and can titrate up to twice daily. Decrease and/or stop Miralax if stools become softer/liquid in consistency.       [facility]  High Fiber Diet    20-30 grams of fiber per day is recommended    Fiber cereal = 5 grams (Raisin Bran, Shredded Wheat, Grape Nuts)  Konsyl 1 teaspoon = 6 grams  Metamucil 1 tablespoon = 3 grams  Citrucel 1 tablespoon = 2 grams  Fiber Choice = 3-4 per day    Drink at least 4-5 glasses of liquids per day or the fiber can be constipating rather then stimulating to your gut.  Boil and bake potatoes in their skin. Eat the skin, too.  Include fresh fruits and raw vegetables in your daily diet. Raw fruits and vegetables have more useful fiber than those that have been peeled, cooked, pureed, or otherwise processed.  Eat a wide variety of fibrous foods in reasonable amounts. Increase fiber intake slowly especially if you have been on a low-fiber diet.  Eat more legumes-peas, beans, soybeans.  For snacks, try dried fruit, whole wheat and rye crackers.  Avoid instant-cook hot cereals. Use the longer cooking cereals.  Use bran whenever possible. Sprinkle it on top of cereal, mix it into mashed potatoes or hamburger meat, or use it in combination dishes such as meat loaf.   Substitute whole grain, whole wheat and bran products for white flour products.  Eat slowly and chew your food thoroughly.    Psyllium has been shown to improve both constipation and diarrhea. Fiber may increase bulk of stool and may also  include alterations in the production of gaseous fermentation products and changes to the gut microbiome. As some patients may experience increased bloating and gas, we suggest a low starting dose of psyllium that provides approximately 3 to 4 grams of soluble fiber per day. The soluble fiber content of psyllium products (ie, per packet, teaspoon, or pill) varies widely; refer to product-specific label to determine dose. The dose should then be slowly titrated up based on response to treatment.     Foods High in Fiber    This diet furnishes adequate amounts of all the essential nutrients needed by the body and a very liberal fiber or roughage content. Roughage is indigestible fiber found in fruits, vegetables and whole grain cereals. It provides bulk to the large intestine and, accompanied by an adequate fluid intake, is a stimulant to elimination. Regular eating and elimination habits are vital to good health.     Fruits:  Use all fruits and juices liberally; fresh, cooked, dried or canned. Eat fruit raw and with skins when possible. Have at least four servings of fruit daily including a citrus fruit and a stewed dried fruit. Hard seeds of fruits (berries, figs, Grapes, mangoes, tomatoes) etc. may be removed.    Vegetables: Use all vegetables liberally. Green leafy vegetables, such as cabbage, spinach, lettuce, broccoli, and other greens are particularly good.    Potato: As desired. Serve baked frequently and eat the skin. Other starchy foods such as rice, macaroni, etc., may be occasionally substituted. Chew popcorn well and do not eat hard kernels.    Meat, Fish, Poultry: One or two servings daily.    Eggs: One daily if you are not on a low cholesterol diet.    Milk: Include at least one-half pint daily. Whole milk or skimmed may be used.     Cereals: Use whole grain breads and cereals such as bran, bran flakes, grape nut flakes, puffed wheat, oatmeal, Wheaties, etc., as much as possible. Refined breaks and  cereals are not restricted; however, they do not contain the bulk necessary for this diet.     Sugars, Sweets: Use very moderately. Depend on fruit as dessert.    Fats: Use butter or margarine as desired. Oil or dressing on salads as desired. Fried foods may be used in moderation. Nuts may be eaten if you chew them well and may be ground or finely chopped for cooking.   Sample Menu                                                                                 Breakfast                          Lunch  Orange juice, 4 ounces                                                Vegetable soup                    Stewed fruit                                                                 Fresh fruit plate with cottage cheese  Shredded wheat                                                           Whole wheat toast  Scrambled eggs                                                           Butter  Whole wheat toast                                                       Coffee or tea  Dinner                                                                         Bedtime  Large glass tomato juice                                             1 glass milk  Roast beef                                                                   stewed fruit  Baked potato with skin  Buttered spinach  Raw vegetable salad  Baked apple with skin   Coffee or tea

## 2025-07-24 NOTE — PROGRESS NOTES
The patient location is: home  The chief complaint leading to consultation is: follow up for constipation     Visit type: audiovisual    Face to Face time with patient: 15  30 minutes of total time spent on the encounter, which includes face to face time and non-face to face time preparing to see the patient (eg, review of tests), Obtaining and/or reviewing separately obtained history, Documenting clinical information in the electronic or other health record, Independently interpreting results (not separately reported) and communicating results to the patient/family/caregiver, or Care coordination (not separately reported).     Each patient to whom he or she provides medical services by telemedicine is:  (1) informed of the relationship between the physician and patient and the respective role of any other health care provider with respect to management of the patient; and (2) notified that he or she may decline to receive medical services by telemedicine and may withdraw from such care at any time.    Patient ID: Larry Madden is a 35 y.o. male.    PCP:   Miguel Hunter       Gastroenterology Clinic Consultation Note    Reason for Visit:  The primary encounter diagnosis was Slow transit constipation. A diagnosis of Lower abdominal pain was also pertinent to this visit.  Chief Complaint: Follow up for Chronic Constipation     History of Present Illness    CHIEF COMPLAINT:  Patient presents today for follow up of constipation.    CONSTIPATION:  He reports improvement in constipation after starting daily fiber supplement recommended to him at our initial encounter. He is now having daily bowel movements with no issues. Denies blood in stool or abdominal pain. He notes significant symptomatic relief with fiber supplementation and reports no current concerns related to bowel function. Was advised if fiber was alone ineffective he could start miralax but says due to effectiveness of fiber alone, he has not  had to resort to this.     Denies any other new GI related issues or concerns at today's visit.     IMAGING:  Previous CT revealed mesenteric lymph nodes in the small bowel. Collaborating physician recommended follow-up CT in three months to reassess lymph node findings, with initial impression of low clinical concern given his current asymptomatic status.    MEDICATIONS:  He is currently taking Zepbound 7.5 mg, with planned dose increase to 10 mg in the next month. He anticipates potential side effects with the upcoming dose escalation.     LIVER ENZYMES:  He reports previous concerns regarding liver enzymes. Per prior documentation, liver enzymes were noted to be on a downward trend during the last clinical encounter. He indicates ongoing monitoring with primary care physician for this issue.    Denies any dysphagia, odynophagia, nausea, vomiting, heartburn or acid regurgitation. No abdominal pains, changes in bowel pattern, blood/ mucus in stool or unintentional weight loss. Nocturnal symptoms. No melena or maroon stools. No recent changes in diet or medications. No family history of IBD, Celiac disease or GI malignancy. No regular NSAIDs. No alcohol or tobacco use. No recent antibiotic use, travels or sick contacts. No prior history of C.diff.    Abdominal Surgeries: None    ROS:  General: -fever, -chills, -fatigue, -weight gain, -weight loss  Eyes: -vision changes, -redness, -discharge  ENT: -ear pain, -nasal congestion, -sore throat  Cardiovascular: -chest pain, -palpitations, -lower extremity edema  Respiratory: -cough, -shortness of breath  Gastrointestinal: -abdominal pain, -nausea, -vomiting, -diarrhea, -constipation, -blood in stool  Genitourinary: -dysuria, -hematuria, -frequency  Musculoskeletal: -joint pain, -muscle pain  Skin: -rash, -lesion  Neurological: -headache, -dizziness, -numbness, -tingling  Psychiatric: -anxiety, -depression, -sleep difficulty    Medical History:  has a past medical history  of Anxiety and Asthma.    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    Family History: family history includes Arthritis in his father; Depression in his mother..     Review of patient's allergies indicates:  No Known Allergies    Labs:  Lab Results   Component Value Date    WBC 6.70 01/03/2025    HGB 15.2 01/03/2025    HCT 43.8 01/03/2025     01/03/2025    CRP 2.7 01/03/2025    CHOL 147 03/06/2024    TRIG 58 03/06/2024    HDL 46 03/06/2024    ALKPHOS 64 01/03/2025    LIPASE 10 03/04/2007    ALT 63 (H) 03/18/2025    AST 26 03/18/2025     03/18/2025    K 4.6 03/18/2025     03/18/2025    CREATININE 0.82 03/18/2025    BUN 13 03/18/2025    CO2 25 03/18/2025    TSH 0.556 01/03/2025    HGBA1C 5.2 03/06/2024     Imaging reviewed:   EXAMINATION:  CT ABDOMEN PELVIS WITH IV CONTRAST 4/17/2025  CLINICAL HISTORY:  RLQ abdominal pain (Age >= 14y);Abdominal pain, acute, nonlocalized; Lower abdominal pain, unspecified  FINDINGS:  Abdomen:  - Lung bases: Clear.  - Liver: Normal.  - Gallbladder and bile ducts: Unremarkable.  - Spleen: Unremarkable.  - Pancreas: Normal.  - Kidneys: No mass or hydronephrosis.  - Adrenals: Unremarkable.  - Retroperitoneum:  No significant adenopathy.  - Vascular: Unremarkable.  - Bowel/mesentery: There is a hazy increased density to the small bowel mesentery with increased numbers of upper normal sized mesenteric lymph nodes.  There is formed stool throughout the large bowel suggesting an element of constipation.  The appendix is normal in the small bowel loops are nondilated.  Pelvis:  No pelvic mass, adenopathy, or free fluid.  Bones:   Unremarkable.  Impression:  Nonspecific mesenteric panniculitis  Mild constipation    Endoscopy reviewed:   Never done    Assessment & Plan    K59.01 Slow transit constipation    CONSTIPATION:  - Constipation symptoms improved with fiber supplement alone, without need for Miralax.  - Explained that fiber supplements are highly effective for managing constipation.  - Educated on importance of adequate water intake with fiber supplements to prevent constipation.  - Continue fiber supplement daily.    ENLARGED LYMPH NODES:  - CT findings of mesenteric lymph nodes in small bowel deemed non-concerning by collaborating physician on initial work up.  - Ordered repeat CT Abdomen today to reassess mesenteric lymph nodes, expecting resolution especially given symptom improvement. However, if unchanged findings from previous, will discuss next steps at that time.     LIVER ENZYMES:  - Liver enzymes were on downward trend at last visit. Currently being followed by his PCP on this matter.     MEDICATION MANAGEMENT:  - Noted Zepbound dosage increase to 7.5 mg, with planned increase to 10 mg in the next month (managed by another provider).  - Can add miralax to fiber supplement as previously recommended if increased dose causes return of constipation issues.     FOLLOW-UP:  - Follow up to review CT results and determine if further action is needed.  - Contact the office with any questions.  - Follow up determined by CT findings. If normal, can follow up in 6 months-1 year or sooner if symptoms progress and/or new GI symptoms occur.      Assessment:  1. Slow transit constipation    2. Lower abdominal pain      Follow up in about 1 year (around 7/24/2026).    Thank you for allowing me to participate in this patient's care.     Sincerely,      DARREN KABA  Gastroenterology Department  [facility]     This note was generated with the assistance of ambient listening technology. Verbal consent was obtained by  the patient and accompanying visitor(s) for the recording of patient appointment to facilitate this note. I attest to having reviewed and edited the generated note for accuracy, though some syntax or spelling errors may persist. Please contact the author of this note for any clarification.     .

## 2025-08-11 ENCOUNTER — HOSPITAL ENCOUNTER (OUTPATIENT)
Dept: RADIOLOGY | Facility: OTHER | Age: 35
Discharge: HOME | End: 2025-08-11
Payer: COMMERCIAL

## 2025-08-11 DIAGNOSIS — R10.30 LOWER ABDOMINAL PAIN: ICD-10-CM

## 2025-08-11 PROCEDURE — 74177 CT ABD & PELVIS W/CONTRAST: CPT | Mod: TC

## 2025-08-11 PROCEDURE — 25500020 PHARM REV CODE 255

## 2025-08-11 PROCEDURE — 74177 CT ABD & PELVIS W/CONTRAST: CPT | Mod: 26,,, | Performed by: RADIOLOGY

## 2025-08-11 RX ADMIN — IOHEXOL 100 ML: 350 INJECTION, SOLUTION INTRAVENOUS at 03:08

## (undated) DEVICE — SEE MEDLINE ITEM 152487

## (undated) DEVICE — DRAPE STERI INSTRUMENT 1018

## (undated) DEVICE — APPLICATOR STERILE 6IN 100/CA

## (undated) DEVICE — CAUTERY BOVIE PENCIL

## (undated) DEVICE — SPONGE GELFOAM 12-7MM

## (undated) DEVICE — SEE MEDLINE ITEM 156934

## (undated) DEVICE — DRESSING TELFA STRL 4X3 LF

## (undated) DEVICE — SEE MEDLINE ITEM 152622

## (undated) DEVICE — SOL PVP-I SCRUB 7.5% 4OZ

## (undated) DEVICE — SEE MEDLINE ITEM 157117

## (undated) DEVICE — SPONGE DERMACEA 4X4IN 12PLY

## (undated) DEVICE — SPONGE PATTY SURGICAL .5X3IN

## (undated) DEVICE — DRAPE STERI-DRAPE 1000 17X11IN

## (undated) DEVICE — SUT 4/0 18IN PROLENE BL MON

## (undated) DEVICE — GLOVE BIOGEL ECLIPSE SZ 7.5

## (undated) DEVICE — SPLINT REUTER BIVALVE 0.5MM

## (undated) DEVICE — SEE MEDLINE ITEM 157194

## (undated) DEVICE — ADHESIVE MASTISOL VIAL 48/BX

## (undated) DEVICE — TUBING SUC UNIV W/CONN 12FT

## (undated) DEVICE — TOWEL OR DISP STRL BLUE 4/PK

## (undated) DEVICE — DRESSING TRANS 2X2 TEGADERM

## (undated) DEVICE — BOWL STERILE LARGE 32OZ

## (undated) DEVICE — ELECTRODE REM PLYHSV RETURN 9

## (undated) DEVICE — SEE MEDLINE ITEM 146313

## (undated) DEVICE — SPONGE COTTON TRAY 4X4IN

## (undated) DEVICE — BLADE INFERIOR TURBINATE 5/PK

## (undated) DEVICE — BLADE SURGICAL 15C

## (undated) DEVICE — ELECTRODE NEEDLE 1IN

## (undated) DEVICE — SKINMARKER & RULER REGULAR X-F

## (undated) DEVICE — DRESSING EYE OVAL LF

## (undated) DEVICE — PENCIL ELECTROSURG HOLST W/BLD

## (undated) DEVICE — APPLICATOR Q-TIPS BULK 3 INCH

## (undated) DEVICE — STAPLER SEPTAL ENTACT